# Patient Record
Sex: FEMALE | Race: WHITE | Employment: OTHER | URBAN - METROPOLITAN AREA
[De-identification: names, ages, dates, MRNs, and addresses within clinical notes are randomized per-mention and may not be internally consistent; named-entity substitution may affect disease eponyms.]

---

## 2019-03-21 ENCOUNTER — HOSPITAL ENCOUNTER (OUTPATIENT)
Age: 64
Setting detail: SPECIMEN
Discharge: HOME | End: 2019-03-21
Payer: MEDICARE

## 2019-03-21 LAB
ALBUMIN SERPL-MCNC: 4.5 G/DL (ref 3.5–5.2)
ALBUMIN/GLOBULIN RATIO: 1.7 (ref 1–2.5)
ALP BLD-CCNC: 86 U/L (ref 35–104)
ALT SERPL-CCNC: 15 U/L (ref 5–33)
ANION GAP SERPL CALCULATED.3IONS-SCNC: 14 MMOL/L (ref 9–17)
AST SERPL-CCNC: 23 U/L
BILIRUB SERPL-MCNC: 0.41 MG/DL (ref 0.3–1.2)
BUN BLDV-MCNC: 17 MG/DL (ref 8–23)
BUN/CREAT BLD: ABNORMAL (ref 9–20)
CALCIUM SERPL-MCNC: 9.6 MG/DL (ref 8.6–10.4)
CHLORIDE BLD-SCNC: 106 MMOL/L (ref 98–107)
CHOLESTEROL/HDL RATIO: 3.5
CHOLESTEROL: 200 MG/DL
CO2: 22 MMOL/L (ref 20–31)
CREAT SERPL-MCNC: 0.83 MG/DL (ref 0.5–0.9)
FOLATE: 10.9 NG/ML
GFR AFRICAN AMERICAN: >60 ML/MIN
GFR NON-AFRICAN AMERICAN: >60 ML/MIN
GFR SERPL CREATININE-BSD FRML MDRD: ABNORMAL ML/MIN/{1.73_M2}
GFR SERPL CREATININE-BSD FRML MDRD: ABNORMAL ML/MIN/{1.73_M2}
GLUCOSE BLD-MCNC: 102 MG/DL (ref 70–99)
HCT VFR BLD CALC: 42.6 % (ref 36.3–47.1)
HDLC SERPL-MCNC: 57 MG/DL
HEMOGLOBIN: 14.1 G/DL (ref 11.9–15.1)
LDL CHOLESTEROL: 119 MG/DL (ref 0–130)
LIPASE: 20 U/L (ref 13–60)
MCH RBC QN AUTO: 30.9 PG (ref 25.2–33.5)
MCHC RBC AUTO-ENTMCNC: 33.1 G/DL (ref 28.4–34.8)
MCV RBC AUTO: 93.4 FL (ref 82.6–102.9)
NRBC AUTOMATED: 0 PER 100 WBC
PDW BLD-RTO: 12.8 % (ref 11.8–14.4)
PLATELET # BLD: 253 K/UL (ref 138–453)
PMV BLD AUTO: 9.6 FL (ref 8.1–13.5)
POTASSIUM SERPL-SCNC: 4 MMOL/L (ref 3.7–5.3)
RBC # BLD: 4.56 M/UL (ref 3.95–5.11)
SODIUM BLD-SCNC: 142 MMOL/L (ref 135–144)
T3 TOTAL: 114 NG/DL (ref 80–200)
T4 TOTAL: 7 UG/DL (ref 4.5–12)
TOTAL PROTEIN: 7.1 G/DL (ref 6.4–8.3)
TRIGL SERPL-MCNC: 121 MG/DL
TSH SERPL DL<=0.05 MIU/L-ACNC: 4 MIU/L (ref 0.3–5)
VITAMIN B-12: 209 PG/ML (ref 232–1245)
VITAMIN D 25-HYDROXY: 41.6 NG/ML (ref 30–100)
VLDLC SERPL CALC-MCNC: ABNORMAL MG/DL (ref 1–30)
WBC # BLD: 4.9 K/UL (ref 3.5–11.3)

## 2019-03-22 ENCOUNTER — HOSPITAL ENCOUNTER (OUTPATIENT)
Age: 64
Discharge: HOME | End: 2019-03-22
Payer: MEDICARE

## 2019-03-22 ENCOUNTER — HOSPITAL ENCOUNTER (OUTPATIENT)
Dept: GENERAL RADIOLOGY | Facility: CLINIC | Age: 64
Discharge: HOME | End: 2019-03-24
Payer: MEDICARE

## 2019-03-22 ENCOUNTER — HOSPITAL ENCOUNTER (OUTPATIENT)
Facility: CLINIC | Age: 64
Discharge: HOME | End: 2019-03-24
Payer: MEDICARE

## 2019-03-22 DIAGNOSIS — R10.13 EPIGASTRIC PAIN: ICD-10-CM

## 2019-03-22 LAB
-: ABNORMAL
AMORPHOUS: ABNORMAL
BACTERIA: ABNORMAL
BILIRUBIN URINE: NEGATIVE
CASTS UA: ABNORMAL /LPF (ref 0–2)
COLOR: YELLOW
COMMENT UA: ABNORMAL
CRYSTALS, UA: ABNORMAL /HPF
CRYSTALS, UA: ABNORMAL /HPF
EPITHELIAL CELLS UA: ABNORMAL /HPF (ref 0–5)
GLUCOSE URINE: NEGATIVE
KETONES, URINE: NEGATIVE
LEUKOCYTE ESTERASE, URINE: ABNORMAL
MUCUS: ABNORMAL
NITRITE, URINE: NEGATIVE
OTHER OBSERVATIONS UA: ABNORMAL
PH UA: 5 (ref 5–8)
PROTEIN UA: NEGATIVE
RBC UA: ABNORMAL /HPF (ref 0–2)
RENAL EPITHELIAL, UA: ABNORMAL /HPF
SPECIFIC GRAVITY UA: 1.02 (ref 1–1.03)
TRICHOMONAS: ABNORMAL
TURBIDITY: ABNORMAL
URINE HGB: ABNORMAL
UROBILINOGEN, URINE: NORMAL
WBC UA: ABNORMAL /HPF (ref 0–5)
YEAST: ABNORMAL

## 2019-03-22 PROCEDURE — 87086 URINE CULTURE/COLONY COUNT: CPT

## 2019-03-22 PROCEDURE — 81001 URINALYSIS AUTO W/SCOPE: CPT

## 2019-03-22 PROCEDURE — 74019 RADEX ABDOMEN 2 VIEWS: CPT

## 2019-03-23 LAB
CULTURE: NORMAL
Lab: NORMAL
SPECIMEN DESCRIPTION: NORMAL

## 2019-08-27 ENCOUNTER — TELEPHONE (OUTPATIENT)
Dept: BARIATRICS/WEIGHT MGMT | Age: 64
End: 2019-08-27

## 2019-10-01 ENCOUNTER — OFFICE VISIT (OUTPATIENT)
Dept: BARIATRICS/WEIGHT MGMT | Age: 64
End: 2019-10-01
Payer: MEDICARE

## 2019-10-01 VITALS
BODY MASS INDEX: 40.18 KG/M2 | SYSTOLIC BLOOD PRESSURE: 114 MMHG | DIASTOLIC BLOOD PRESSURE: 80 MMHG | RESPIRATION RATE: 20 BRPM | HEART RATE: 68 BPM | HEIGHT: 66 IN | WEIGHT: 250 LBS

## 2019-10-01 DIAGNOSIS — R10.10 PAIN OF UPPER ABDOMEN: Primary | ICD-10-CM

## 2019-10-01 DIAGNOSIS — E78.2 MIXED HYPERLIPIDEMIA: ICD-10-CM

## 2019-10-01 DIAGNOSIS — Z86.711 HISTORY OF PULMONARY EMBOLISM: ICD-10-CM

## 2019-10-01 PROCEDURE — 3017F COLORECTAL CA SCREEN DOC REV: CPT | Performed by: SURGERY

## 2019-10-01 PROCEDURE — G8484 FLU IMMUNIZE NO ADMIN: HCPCS | Performed by: SURGERY

## 2019-10-01 PROCEDURE — 99204 OFFICE O/P NEW MOD 45 MIN: CPT | Performed by: SURGERY

## 2019-10-01 PROCEDURE — 1036F TOBACCO NON-USER: CPT | Performed by: SURGERY

## 2019-10-01 PROCEDURE — G8427 DOCREV CUR MEDS BY ELIG CLIN: HCPCS | Performed by: SURGERY

## 2019-10-01 PROCEDURE — G8417 CALC BMI ABV UP PARAM F/U: HCPCS | Performed by: SURGERY

## 2019-10-01 RX ORDER — IBUPROFEN 600 MG/1
TABLET ORAL
COMMUNITY
Start: 2018-05-02 | End: 2020-01-03

## 2019-10-01 RX ORDER — PANTOPRAZOLE SODIUM 40 MG/1
40 TABLET, DELAYED RELEASE ORAL
COMMUNITY

## 2019-10-01 RX ORDER — MELOXICAM 15 MG/1
TABLET ORAL
Refills: 2 | COMMUNITY
Start: 2019-09-13 | End: 2020-08-17

## 2019-10-04 ENCOUNTER — NURSE ONLY (OUTPATIENT)
Dept: BARIATRICS/WEIGHT MGMT | Age: 64
End: 2019-10-04

## 2019-10-04 ENCOUNTER — HOSPITAL ENCOUNTER (OUTPATIENT)
Age: 64
Setting detail: SPECIMEN
Discharge: HOME | End: 2019-10-04
Payer: MEDICARE

## 2019-10-04 VITALS — BODY MASS INDEX: 40.05 KG/M2 | WEIGHT: 248 LBS

## 2019-10-04 DIAGNOSIS — R10.10 PAIN OF UPPER ABDOMEN: ICD-10-CM

## 2019-10-04 LAB
ALBUMIN SERPL-MCNC: 4.3 G/DL (ref 3.5–5.2)
ALBUMIN/GLOBULIN RATIO: 1.5 (ref 1–2.5)
ALP BLD-CCNC: 89 U/L (ref 35–104)
ALT SERPL-CCNC: 15 U/L (ref 5–33)
ANION GAP SERPL CALCULATED.3IONS-SCNC: 15 MMOL/L (ref 9–17)
AST SERPL-CCNC: 18 U/L
BILIRUB SERPL-MCNC: 0.49 MG/DL (ref 0.3–1.2)
BUN BLDV-MCNC: 17 MG/DL (ref 8–23)
BUN/CREAT BLD: NORMAL (ref 9–20)
CALCIUM SERPL-MCNC: 9.3 MG/DL (ref 8.6–10.4)
CHLORIDE BLD-SCNC: 105 MMOL/L (ref 98–107)
CHOLESTEROL/HDL RATIO: 3.3
CHOLESTEROL: 177 MG/DL
CO2: 24 MMOL/L (ref 20–31)
CREAT SERPL-MCNC: 0.76 MG/DL (ref 0.5–0.9)
ESTIMATED AVERAGE GLUCOSE: 117 MG/DL
FOLATE: 11.9 NG/ML
GFR AFRICAN AMERICAN: >60 ML/MIN
GFR NON-AFRICAN AMERICAN: >60 ML/MIN
GFR SERPL CREATININE-BSD FRML MDRD: NORMAL ML/MIN/{1.73_M2}
GFR SERPL CREATININE-BSD FRML MDRD: NORMAL ML/MIN/{1.73_M2}
GLUCOSE BLD-MCNC: 98 MG/DL (ref 70–99)
HBA1C MFR BLD: 5.7 % (ref 4–6)
HCT VFR BLD CALC: 39.3 % (ref 36.3–47.1)
HDLC SERPL-MCNC: 53 MG/DL
HEMOGLOBIN: 12.7 G/DL (ref 11.9–15.1)
IRON SATURATION: 25 % (ref 20–55)
IRON: 87 UG/DL (ref 37–145)
LDL CHOLESTEROL: 98 MG/DL (ref 0–130)
MAGNESIUM: 2.3 MG/DL (ref 1.6–2.6)
MCH RBC QN AUTO: 29.9 PG (ref 25.2–33.5)
MCHC RBC AUTO-ENTMCNC: 32.3 G/DL (ref 28.4–34.8)
MCV RBC AUTO: 92.5 FL (ref 82.6–102.9)
NRBC AUTOMATED: 0 PER 100 WBC
PDW BLD-RTO: 13.2 % (ref 11.8–14.4)
PLATELET # BLD: 252 K/UL (ref 138–453)
PMV BLD AUTO: 9.9 FL (ref 8.1–13.5)
POTASSIUM SERPL-SCNC: 3.9 MMOL/L (ref 3.7–5.3)
PTH INTACT: 73.12 PG/ML (ref 15–65)
RBC # BLD: 4.25 M/UL (ref 3.95–5.11)
SODIUM BLD-SCNC: 144 MMOL/L (ref 135–144)
T4 TOTAL: 6.1 UG/DL (ref 4.5–12)
TOTAL IRON BINDING CAPACITY: 354 UG/DL (ref 250–450)
TOTAL PROTEIN: 7.1 G/DL (ref 6.4–8.3)
TRIGL SERPL-MCNC: 128 MG/DL
TSH SERPL DL<=0.05 MIU/L-ACNC: 4.42 MIU/L (ref 0.3–5)
UNSATURATED IRON BINDING CAPACITY: 267 UG/DL (ref 112–347)
VITAMIN B-12: 909 PG/ML (ref 232–1245)
VITAMIN D 25-HYDROXY: 39.8 NG/ML (ref 30–100)
VLDLC SERPL CALC-MCNC: NORMAL MG/DL (ref 1–30)
WBC # BLD: 4.8 K/UL (ref 3.5–11.3)

## 2019-10-04 PROCEDURE — 83970 ASSAY OF PARATHORMONE: CPT

## 2019-10-04 PROCEDURE — 82746 ASSAY OF FOLIC ACID SERUM: CPT

## 2019-10-04 PROCEDURE — 84425 ASSAY OF VITAMIN B-1: CPT

## 2019-10-04 PROCEDURE — 84436 ASSAY OF TOTAL THYROXINE: CPT

## 2019-10-04 PROCEDURE — 83550 IRON BINDING TEST: CPT

## 2019-10-04 PROCEDURE — 80061 LIPID PANEL: CPT

## 2019-10-04 PROCEDURE — 83540 ASSAY OF IRON: CPT

## 2019-10-04 PROCEDURE — 84590 ASSAY OF VITAMIN A: CPT

## 2019-10-04 PROCEDURE — 85027 COMPLETE CBC AUTOMATED: CPT

## 2019-10-04 PROCEDURE — 80053 COMPREHEN METABOLIC PANEL: CPT

## 2019-10-04 PROCEDURE — 83735 ASSAY OF MAGNESIUM: CPT

## 2019-10-04 PROCEDURE — 84443 ASSAY THYROID STIM HORMONE: CPT

## 2019-10-04 PROCEDURE — 36415 COLL VENOUS BLD VENIPUNCTURE: CPT

## 2019-10-04 PROCEDURE — 83036 HEMOGLOBIN GLYCOSYLATED A1C: CPT

## 2019-10-04 PROCEDURE — 84630 ASSAY OF ZINC: CPT

## 2019-10-04 PROCEDURE — 82306 VITAMIN D 25 HYDROXY: CPT

## 2019-10-04 PROCEDURE — 82607 VITAMIN B-12: CPT

## 2019-10-08 LAB — ZINC: 75 UG/DL (ref 60–120)

## 2019-10-09 LAB
RETINYL PALMITATE: 0.02 MG/L (ref 0–0.1)
VITAMIN A LEVEL: 0.51 MG/L (ref 0.3–1.2)
VITAMIN A, INTERP: NORMAL
VITAMIN B1 WHOLE BLOOD: 125 NMOL/L (ref 70–180)

## 2019-10-22 ENCOUNTER — TELEPHONE (OUTPATIENT)
Dept: ONCOLOGY | Age: 64
End: 2019-10-22

## 2019-10-22 ENCOUNTER — INITIAL CONSULT (OUTPATIENT)
Dept: ONCOLOGY | Age: 64
End: 2019-10-22
Payer: MEDICARE

## 2019-10-22 VITALS
WEIGHT: 246.6 LBS | HEART RATE: 63 BPM | RESPIRATION RATE: 18 BRPM | TEMPERATURE: 98.4 F | BODY MASS INDEX: 41.09 KG/M2 | DIASTOLIC BLOOD PRESSURE: 75 MMHG | HEIGHT: 65 IN | SYSTOLIC BLOOD PRESSURE: 118 MMHG

## 2019-10-22 DIAGNOSIS — E66.01 MORBID OBESITY (HCC): ICD-10-CM

## 2019-10-22 DIAGNOSIS — Z86.718 HX OF DEEP VENOUS THROMBOSIS: Primary | ICD-10-CM

## 2019-10-22 PROCEDURE — 99203 OFFICE O/P NEW LOW 30 MIN: CPT | Performed by: INTERNAL MEDICINE

## 2019-10-22 PROCEDURE — 1090F PRES/ABSN URINE INCON ASSESS: CPT | Performed by: INTERNAL MEDICINE

## 2019-10-22 PROCEDURE — G8417 CALC BMI ABV UP PARAM F/U: HCPCS | Performed by: INTERNAL MEDICINE

## 2019-10-22 PROCEDURE — G8427 DOCREV CUR MEDS BY ELIG CLIN: HCPCS | Performed by: INTERNAL MEDICINE

## 2019-10-22 PROCEDURE — G8484 FLU IMMUNIZE NO ADMIN: HCPCS | Performed by: INTERNAL MEDICINE

## 2019-10-22 PROCEDURE — 99201 HC NEW PT, E/M LEVEL 1: CPT

## 2019-10-22 RX ORDER — PRAVASTATIN SODIUM 20 MG
1 TABLET ORAL DAILY
Refills: 3 | COMMUNITY
Start: 2019-10-18

## 2019-10-22 RX ORDER — LANOLIN ALCOHOL/MO/W.PET/CERES
10 CREAM (GRAM) TOPICAL NIGHTLY PRN
COMMUNITY

## 2019-10-23 ENCOUNTER — OFFICE VISIT (OUTPATIENT)
Dept: BARIATRICS/WEIGHT MGMT | Age: 64
End: 2019-10-23
Payer: MEDICARE

## 2019-10-23 ENCOUNTER — NURSE ONLY (OUTPATIENT)
Dept: BARIATRICS/WEIGHT MGMT | Age: 64
End: 2019-10-23

## 2019-10-23 VITALS
DIASTOLIC BLOOD PRESSURE: 70 MMHG | HEART RATE: 72 BPM | BODY MASS INDEX: 40.98 KG/M2 | HEIGHT: 65 IN | SYSTOLIC BLOOD PRESSURE: 126 MMHG | WEIGHT: 246 LBS | RESPIRATION RATE: 18 BRPM

## 2019-10-23 DIAGNOSIS — E78.5 HYPERLIPIDEMIA, UNSPECIFIED HYPERLIPIDEMIA TYPE: ICD-10-CM

## 2019-10-23 DIAGNOSIS — Z86.711 HISTORY OF PULMONARY EMBOLISM: ICD-10-CM

## 2019-10-23 DIAGNOSIS — G89.29 BILATERAL CHRONIC KNEE PAIN: ICD-10-CM

## 2019-10-23 DIAGNOSIS — E66.01 OBESITY, CLASS III, BMI 40-49.9 (MORBID OBESITY) (HCC): ICD-10-CM

## 2019-10-23 DIAGNOSIS — K21.9 GASTROESOPHAGEAL REFLUX DISEASE WITHOUT ESOPHAGITIS: ICD-10-CM

## 2019-10-23 DIAGNOSIS — M15.9 PRIMARY OSTEOARTHRITIS INVOLVING MULTIPLE JOINTS: ICD-10-CM

## 2019-10-23 DIAGNOSIS — F12.90 MARIJUANA USE: ICD-10-CM

## 2019-10-23 DIAGNOSIS — G89.29 CHRONIC HIP PAIN, BILATERAL: ICD-10-CM

## 2019-10-23 DIAGNOSIS — F32.A DEPRESSION, UNSPECIFIED DEPRESSION TYPE: ICD-10-CM

## 2019-10-23 DIAGNOSIS — Z87.442 HISTORY OF KIDNEY STONES: ICD-10-CM

## 2019-10-23 DIAGNOSIS — I10 ESSENTIAL HYPERTENSION: ICD-10-CM

## 2019-10-23 DIAGNOSIS — M79.7 FIBROMYALGIA: ICD-10-CM

## 2019-10-23 DIAGNOSIS — M25.562 BILATERAL CHRONIC KNEE PAIN: ICD-10-CM

## 2019-10-23 DIAGNOSIS — G47.33 OSA (OBSTRUCTIVE SLEEP APNEA): ICD-10-CM

## 2019-10-23 DIAGNOSIS — M25.552 CHRONIC HIP PAIN, BILATERAL: ICD-10-CM

## 2019-10-23 DIAGNOSIS — M25.561 BILATERAL CHRONIC KNEE PAIN: ICD-10-CM

## 2019-10-23 DIAGNOSIS — R73.03 PREDIABETES: ICD-10-CM

## 2019-10-23 DIAGNOSIS — M25.551 CHRONIC HIP PAIN, BILATERAL: ICD-10-CM

## 2019-10-23 PROBLEM — M19.90 OSTEOARTHRITIS: Status: ACTIVE | Noted: 2019-10-23

## 2019-10-23 PROBLEM — E66.813 OBESITY, CLASS III, BMI 40-49.9 (MORBID OBESITY): Status: ACTIVE | Noted: 2019-10-23

## 2019-10-23 PROCEDURE — G8427 DOCREV CUR MEDS BY ELIG CLIN: HCPCS | Performed by: NURSE PRACTITIONER

## 2019-10-23 PROCEDURE — 3017F COLORECTAL CA SCREEN DOC REV: CPT | Performed by: NURSE PRACTITIONER

## 2019-10-23 PROCEDURE — 1036F TOBACCO NON-USER: CPT | Performed by: NURSE PRACTITIONER

## 2019-10-23 PROCEDURE — G8400 PT W/DXA NO RESULTS DOC: HCPCS | Performed by: NURSE PRACTITIONER

## 2019-10-23 PROCEDURE — 99213 OFFICE O/P EST LOW 20 MIN: CPT | Performed by: NURSE PRACTITIONER

## 2019-10-23 PROCEDURE — 1123F ACP DISCUSS/DSCN MKR DOCD: CPT | Performed by: NURSE PRACTITIONER

## 2019-10-23 PROCEDURE — 1090F PRES/ABSN URINE INCON ASSESS: CPT | Performed by: NURSE PRACTITIONER

## 2019-10-23 PROCEDURE — G8484 FLU IMMUNIZE NO ADMIN: HCPCS | Performed by: NURSE PRACTITIONER

## 2019-10-23 PROCEDURE — 4040F PNEUMOC VAC/ADMIN/RCVD: CPT | Performed by: NURSE PRACTITIONER

## 2019-10-23 PROCEDURE — G8417 CALC BMI ABV UP PARAM F/U: HCPCS | Performed by: NURSE PRACTITIONER

## 2019-11-22 ENCOUNTER — OFFICE VISIT (OUTPATIENT)
Dept: BARIATRICS/WEIGHT MGMT | Age: 64
End: 2019-11-22
Payer: MEDICARE

## 2019-11-22 VITALS
SYSTOLIC BLOOD PRESSURE: 126 MMHG | WEIGHT: 238 LBS | BODY MASS INDEX: 39.65 KG/M2 | HEART RATE: 74 BPM | DIASTOLIC BLOOD PRESSURE: 76 MMHG | RESPIRATION RATE: 18 BRPM | HEIGHT: 65 IN

## 2019-11-22 DIAGNOSIS — M15.9 PRIMARY OSTEOARTHRITIS INVOLVING MULTIPLE JOINTS: ICD-10-CM

## 2019-11-22 DIAGNOSIS — M79.7 FIBROMYALGIA: ICD-10-CM

## 2019-11-22 DIAGNOSIS — M25.561 BILATERAL CHRONIC KNEE PAIN: ICD-10-CM

## 2019-11-22 DIAGNOSIS — M25.562 BILATERAL CHRONIC KNEE PAIN: ICD-10-CM

## 2019-11-22 DIAGNOSIS — K21.9 GASTROESOPHAGEAL REFLUX DISEASE WITHOUT ESOPHAGITIS: ICD-10-CM

## 2019-11-22 DIAGNOSIS — F32.A DEPRESSION, UNSPECIFIED DEPRESSION TYPE: ICD-10-CM

## 2019-11-22 DIAGNOSIS — Z86.711 HISTORY OF PULMONARY EMBOLISM: ICD-10-CM

## 2019-11-22 DIAGNOSIS — G89.29 BILATERAL CHRONIC KNEE PAIN: ICD-10-CM

## 2019-11-22 DIAGNOSIS — M25.551 CHRONIC HIP PAIN, BILATERAL: ICD-10-CM

## 2019-11-22 DIAGNOSIS — E78.5 HYPERLIPIDEMIA, UNSPECIFIED HYPERLIPIDEMIA TYPE: Primary | ICD-10-CM

## 2019-11-22 DIAGNOSIS — E66.9 OBESITY (BMI 30-39.9): ICD-10-CM

## 2019-11-22 DIAGNOSIS — G47.33 OSA (OBSTRUCTIVE SLEEP APNEA): ICD-10-CM

## 2019-11-22 DIAGNOSIS — M25.552 CHRONIC HIP PAIN, BILATERAL: ICD-10-CM

## 2019-11-22 DIAGNOSIS — G89.29 CHRONIC HIP PAIN, BILATERAL: ICD-10-CM

## 2019-11-22 DIAGNOSIS — Z87.442 HISTORY OF KIDNEY STONES: ICD-10-CM

## 2019-11-22 DIAGNOSIS — R73.03 PREDIABETES: ICD-10-CM

## 2019-11-22 PROBLEM — E66.01 OBESITY, CLASS III, BMI 40-49.9 (MORBID OBESITY) (HCC): Status: RESOLVED | Noted: 2019-10-23 | Resolved: 2019-11-22

## 2019-11-22 PROBLEM — E66.813 OBESITY, CLASS III, BMI 40-49.9 (MORBID OBESITY) (HCC): Status: RESOLVED | Noted: 2019-10-23 | Resolved: 2019-11-22

## 2019-11-22 PROCEDURE — 4040F PNEUMOC VAC/ADMIN/RCVD: CPT | Performed by: NURSE PRACTITIONER

## 2019-11-22 PROCEDURE — 1090F PRES/ABSN URINE INCON ASSESS: CPT | Performed by: NURSE PRACTITIONER

## 2019-11-22 PROCEDURE — 1036F TOBACCO NON-USER: CPT | Performed by: NURSE PRACTITIONER

## 2019-11-22 PROCEDURE — G8400 PT W/DXA NO RESULTS DOC: HCPCS | Performed by: NURSE PRACTITIONER

## 2019-11-22 PROCEDURE — 3017F COLORECTAL CA SCREEN DOC REV: CPT | Performed by: NURSE PRACTITIONER

## 2019-11-22 PROCEDURE — G8417 CALC BMI ABV UP PARAM F/U: HCPCS | Performed by: NURSE PRACTITIONER

## 2019-11-22 PROCEDURE — 99213 OFFICE O/P EST LOW 20 MIN: CPT | Performed by: NURSE PRACTITIONER

## 2019-11-22 PROCEDURE — G8427 DOCREV CUR MEDS BY ELIG CLIN: HCPCS | Performed by: NURSE PRACTITIONER

## 2019-11-22 PROCEDURE — G8484 FLU IMMUNIZE NO ADMIN: HCPCS | Performed by: NURSE PRACTITIONER

## 2019-11-22 PROCEDURE — 1123F ACP DISCUSS/DSCN MKR DOCD: CPT | Performed by: NURSE PRACTITIONER

## 2019-12-03 ENCOUNTER — ANESTHESIA EVENT (OUTPATIENT)
Dept: ENDOSCOPY | Age: 64
End: 2019-12-03
Payer: MEDICARE

## 2019-12-04 ENCOUNTER — HOSPITAL ENCOUNTER (OUTPATIENT)
Age: 64
Setting detail: OUTPATIENT SURGERY
Discharge: HOME | End: 2019-12-04
Attending: SURGERY | Admitting: SURGERY
Payer: MEDICARE

## 2019-12-04 ENCOUNTER — ANESTHESIA (OUTPATIENT)
Dept: ENDOSCOPY | Age: 64
End: 2019-12-04
Payer: MEDICARE

## 2019-12-04 VITALS
OXYGEN SATURATION: 99 % | HEIGHT: 66 IN | BODY MASS INDEX: 38.57 KG/M2 | SYSTOLIC BLOOD PRESSURE: 122 MMHG | DIASTOLIC BLOOD PRESSURE: 79 MMHG | TEMPERATURE: 98 F | RESPIRATION RATE: 16 BRPM | HEART RATE: 70 BPM | WEIGHT: 240 LBS

## 2019-12-04 VITALS
OXYGEN SATURATION: 93 % | DIASTOLIC BLOOD PRESSURE: 95 MMHG | RESPIRATION RATE: 19 BRPM | SYSTOLIC BLOOD PRESSURE: 145 MMHG

## 2019-12-04 PROCEDURE — 3609012400 HC EGD TRANSORAL BIOPSY SINGLE/MULTIPLE: Performed by: SURGERY

## 2019-12-04 PROCEDURE — 6360000002 HC RX W HCPCS: Performed by: NURSE ANESTHETIST, CERTIFIED REGISTERED

## 2019-12-04 PROCEDURE — 3700000000 HC ANESTHESIA ATTENDED CARE: Performed by: SURGERY

## 2019-12-04 PROCEDURE — 7100000011 HC PHASE II RECOVERY - ADDTL 15 MIN: Performed by: SURGERY

## 2019-12-04 PROCEDURE — 2709999900 HC NON-CHARGEABLE SUPPLY: Performed by: SURGERY

## 2019-12-04 PROCEDURE — 87077 CULTURE AEROBIC IDENTIFY: CPT

## 2019-12-04 PROCEDURE — 7100000010 HC PHASE II RECOVERY - FIRST 15 MIN: Performed by: SURGERY

## 2019-12-04 PROCEDURE — 2500000003 HC RX 250 WO HCPCS: Performed by: NURSE ANESTHETIST, CERTIFIED REGISTERED

## 2019-12-04 PROCEDURE — 2580000003 HC RX 258: Performed by: NURSE ANESTHETIST, CERTIFIED REGISTERED

## 2019-12-04 PROCEDURE — 43239 EGD BIOPSY SINGLE/MULTIPLE: CPT | Performed by: SURGERY

## 2019-12-04 RX ORDER — PROPOFOL 10 MG/ML
INJECTION, EMULSION INTRAVENOUS PRN
Status: DISCONTINUED | OUTPATIENT
Start: 2019-12-04 | End: 2019-12-04 | Stop reason: SDUPTHER

## 2019-12-04 RX ORDER — SODIUM CHLORIDE, SODIUM LACTATE, POTASSIUM CHLORIDE, CALCIUM CHLORIDE 600; 310; 30; 20 MG/100ML; MG/100ML; MG/100ML; MG/100ML
INJECTION, SOLUTION INTRAVENOUS CONTINUOUS
Status: DISCONTINUED | OUTPATIENT
Start: 2019-12-04 | End: 2019-12-04 | Stop reason: HOSPADM

## 2019-12-04 RX ORDER — LIDOCAINE HYDROCHLORIDE 10 MG/ML
INJECTION, SOLUTION EPIDURAL; INFILTRATION; INTRACAUDAL; PERINEURAL PRN
Status: DISCONTINUED | OUTPATIENT
Start: 2019-12-04 | End: 2019-12-04 | Stop reason: SDUPTHER

## 2019-12-04 RX ORDER — SODIUM CHLORIDE 9 MG/ML
INJECTION, SOLUTION INTRAVENOUS CONTINUOUS PRN
Status: DISCONTINUED | OUTPATIENT
Start: 2019-12-04 | End: 2019-12-04 | Stop reason: SDUPTHER

## 2019-12-04 RX ORDER — LIDOCAINE HYDROCHLORIDE 10 MG/ML
1 INJECTION, SOLUTION EPIDURAL; INFILTRATION; INTRACAUDAL; PERINEURAL
Status: DISCONTINUED | OUTPATIENT
Start: 2019-12-04 | End: 2019-12-04 | Stop reason: HOSPADM

## 2019-12-04 RX ORDER — SODIUM CHLORIDE 9 MG/ML
INJECTION, SOLUTION INTRAVENOUS CONTINUOUS
Status: CANCELLED | OUTPATIENT
Start: 2019-12-04

## 2019-12-04 RX ADMIN — LIDOCAINE HYDROCHLORIDE 50 MG: 10 INJECTION, SOLUTION EPIDURAL; INFILTRATION; INTRACAUDAL at 09:30

## 2019-12-04 RX ADMIN — SODIUM CHLORIDE: 9 INJECTION, SOLUTION INTRAVENOUS at 09:28

## 2019-12-04 RX ADMIN — PROPOFOL 70 MG: 10 INJECTION, EMULSION INTRAVENOUS at 09:30

## 2019-12-04 RX ADMIN — PROPOFOL 20 MG: 10 INJECTION, EMULSION INTRAVENOUS at 09:32

## 2019-12-04 RX ADMIN — PROPOFOL 30 MG: 10 INJECTION, EMULSION INTRAVENOUS at 09:31

## 2019-12-04 ASSESSMENT — PAIN DESCRIPTION - LOCATION
LOCATION: HEAD
LOCATION: HEAD

## 2019-12-04 ASSESSMENT — PAIN DESCRIPTION - DESCRIPTORS
DESCRIPTORS: PRESSURE

## 2019-12-04 ASSESSMENT — PAIN SCALES - GENERAL
PAINLEVEL_OUTOF10: 0
PAINLEVEL_OUTOF10: 6
PAINLEVEL_OUTOF10: 6

## 2019-12-04 ASSESSMENT — LIFESTYLE VARIABLES: SMOKING_STATUS: 0

## 2019-12-04 ASSESSMENT — PAIN - FUNCTIONAL ASSESSMENT: PAIN_FUNCTIONAL_ASSESSMENT: 0-10

## 2019-12-05 LAB
DIRECT EXAM: NEGATIVE
Lab: NORMAL
SPECIMEN DESCRIPTION: NORMAL

## 2019-12-11 ENCOUNTER — NURSE ONLY (OUTPATIENT)
Dept: BARIATRICS/WEIGHT MGMT | Age: 64
End: 2019-12-11

## 2019-12-13 ENCOUNTER — OFFICE VISIT (OUTPATIENT)
Dept: BARIATRICS/WEIGHT MGMT | Age: 64
End: 2019-12-13
Payer: MEDICARE

## 2019-12-13 VITALS
HEIGHT: 66 IN | RESPIRATION RATE: 18 BRPM | HEART RATE: 72 BPM | SYSTOLIC BLOOD PRESSURE: 122 MMHG | BODY MASS INDEX: 38.73 KG/M2 | DIASTOLIC BLOOD PRESSURE: 72 MMHG | WEIGHT: 241 LBS

## 2019-12-13 DIAGNOSIS — M25.551 CHRONIC HIP PAIN, BILATERAL: ICD-10-CM

## 2019-12-13 DIAGNOSIS — G47.33 OSA (OBSTRUCTIVE SLEEP APNEA): Primary | ICD-10-CM

## 2019-12-13 DIAGNOSIS — F12.90 MARIJUANA USE: ICD-10-CM

## 2019-12-13 DIAGNOSIS — M79.7 FIBROMYALGIA: ICD-10-CM

## 2019-12-13 DIAGNOSIS — M25.562 BILATERAL CHRONIC KNEE PAIN: ICD-10-CM

## 2019-12-13 DIAGNOSIS — G89.29 CHRONIC HIP PAIN, BILATERAL: ICD-10-CM

## 2019-12-13 DIAGNOSIS — E78.5 HYPERLIPIDEMIA, UNSPECIFIED HYPERLIPIDEMIA TYPE: ICD-10-CM

## 2019-12-13 DIAGNOSIS — Z86.711 HISTORY OF PULMONARY EMBOLISM: ICD-10-CM

## 2019-12-13 DIAGNOSIS — K21.9 GASTROESOPHAGEAL REFLUX DISEASE WITHOUT ESOPHAGITIS: ICD-10-CM

## 2019-12-13 DIAGNOSIS — F32.A DEPRESSION, UNSPECIFIED DEPRESSION TYPE: ICD-10-CM

## 2019-12-13 DIAGNOSIS — G89.29 BILATERAL CHRONIC KNEE PAIN: ICD-10-CM

## 2019-12-13 DIAGNOSIS — R73.03 PREDIABETES: ICD-10-CM

## 2019-12-13 DIAGNOSIS — E66.9 OBESITY (BMI 30-39.9): ICD-10-CM

## 2019-12-13 DIAGNOSIS — Z87.442 HISTORY OF KIDNEY STONES: ICD-10-CM

## 2019-12-13 DIAGNOSIS — M25.552 CHRONIC HIP PAIN, BILATERAL: ICD-10-CM

## 2019-12-13 DIAGNOSIS — M25.561 BILATERAL CHRONIC KNEE PAIN: ICD-10-CM

## 2019-12-13 PROCEDURE — G8417 CALC BMI ABV UP PARAM F/U: HCPCS | Performed by: NURSE PRACTITIONER

## 2019-12-13 PROCEDURE — G8427 DOCREV CUR MEDS BY ELIG CLIN: HCPCS | Performed by: NURSE PRACTITIONER

## 2019-12-13 PROCEDURE — 4040F PNEUMOC VAC/ADMIN/RCVD: CPT | Performed by: NURSE PRACTITIONER

## 2019-12-13 PROCEDURE — 1036F TOBACCO NON-USER: CPT | Performed by: NURSE PRACTITIONER

## 2019-12-13 PROCEDURE — 3017F COLORECTAL CA SCREEN DOC REV: CPT | Performed by: NURSE PRACTITIONER

## 2019-12-13 PROCEDURE — 1123F ACP DISCUSS/DSCN MKR DOCD: CPT | Performed by: NURSE PRACTITIONER

## 2019-12-13 PROCEDURE — G8400 PT W/DXA NO RESULTS DOC: HCPCS | Performed by: NURSE PRACTITIONER

## 2019-12-13 PROCEDURE — 99213 OFFICE O/P EST LOW 20 MIN: CPT | Performed by: NURSE PRACTITIONER

## 2019-12-13 PROCEDURE — G8484 FLU IMMUNIZE NO ADMIN: HCPCS | Performed by: NURSE PRACTITIONER

## 2019-12-13 PROCEDURE — 1090F PRES/ABSN URINE INCON ASSESS: CPT | Performed by: NURSE PRACTITIONER

## 2020-01-03 ENCOUNTER — OFFICE VISIT (OUTPATIENT)
Dept: BARIATRICS/WEIGHT MGMT | Age: 65
End: 2020-01-03
Payer: MEDICARE

## 2020-01-03 VITALS
HEIGHT: 65 IN | BODY MASS INDEX: 40.15 KG/M2 | HEART RATE: 80 BPM | WEIGHT: 241 LBS | DIASTOLIC BLOOD PRESSURE: 82 MMHG | SYSTOLIC BLOOD PRESSURE: 130 MMHG

## 2020-01-03 PROBLEM — F12.91 HISTORY OF MARIJUANA USE: Status: ACTIVE | Noted: 2020-01-03

## 2020-01-03 PROCEDURE — 1036F TOBACCO NON-USER: CPT | Performed by: NURSE PRACTITIONER

## 2020-01-03 PROCEDURE — 1123F ACP DISCUSS/DSCN MKR DOCD: CPT | Performed by: NURSE PRACTITIONER

## 2020-01-03 PROCEDURE — G8484 FLU IMMUNIZE NO ADMIN: HCPCS | Performed by: NURSE PRACTITIONER

## 2020-01-03 PROCEDURE — G8427 DOCREV CUR MEDS BY ELIG CLIN: HCPCS | Performed by: NURSE PRACTITIONER

## 2020-01-03 PROCEDURE — 1090F PRES/ABSN URINE INCON ASSESS: CPT | Performed by: NURSE PRACTITIONER

## 2020-01-03 PROCEDURE — G8417 CALC BMI ABV UP PARAM F/U: HCPCS | Performed by: NURSE PRACTITIONER

## 2020-01-03 PROCEDURE — 3017F COLORECTAL CA SCREEN DOC REV: CPT | Performed by: NURSE PRACTITIONER

## 2020-01-03 PROCEDURE — 4040F PNEUMOC VAC/ADMIN/RCVD: CPT | Performed by: NURSE PRACTITIONER

## 2020-01-03 PROCEDURE — 99213 OFFICE O/P EST LOW 20 MIN: CPT | Performed by: NURSE PRACTITIONER

## 2020-01-03 PROCEDURE — G8400 PT W/DXA NO RESULTS DOC: HCPCS | Performed by: NURSE PRACTITIONER

## 2020-01-03 NOTE — PROGRESS NOTES
History   Problem Relation Age of Onset    Hypertension Father     Stroke Father     Alzheimer's Disease Mother     Coronary Art Dis Mother     Heart Attack Mother     Heart Disease Brother     No Known Problems Maternal Grandmother     Cancer Maternal Grandfather         lung    No Known Problems Paternal Grandmother     No Known Problems Paternal Grandfather        Social History:  Social History     Socioeconomic History    Marital status:      Spouse name: Not on file    Number of children: Not on file    Years of education: Not on file    Highest education level: Not on file   Occupational History    Not on file   Social Needs    Financial resource strain: Not on file    Food insecurity:     Worry: Not on file     Inability: Not on file    Transportation needs:     Medical: Not on file     Non-medical: Not on file   Tobacco Use    Smoking status: Former Smoker     Packs/day: 0.25     Years: 10.00     Pack years: 2.50     Types: Cigarettes     Last attempt to quit: 10/22/1978     Years since quittin.2    Smokeless tobacco: Never Used   Substance and Sexual Activity    Alcohol use: No    Drug use: Yes     Types: Marijuana     Comment: medical Jeanneemily Kim; no longer uses    Sexual activity: Not on file   Lifestyle    Physical activity:     Days per week: Not on file     Minutes per session: Not on file    Stress: Not on file   Relationships    Social connections:     Talks on phone: Not on file     Gets together: Not on file     Attends Mandaen service: Not on file     Active member of club or organization: Not on file     Attends meetings of clubs or organizations: Not on file     Relationship status: Not on file    Intimate partner violence:     Fear of current or ex partner: Not on file     Emotionally abused: Not on file     Physically abused: Not on file     Forced sexual activity: Not on file   Other Topics Concern    Not on file   Social History Narrative    Not on file       Current Medications:  Current Outpatient Medications   Medication Sig Dispense Refill    pravastatin (PRAVACHOL) 20 MG tablet Take 1 tablet by mouth daily  3    melatonin 3 MG TABS tablet Take 3 mg by mouth nightly as needed      pantoprazole (PROTONIX) 40 MG tablet Take 40 mg by mouth      meloxicam (MOBIC) 15 MG tablet TK 1 T PO  QD  2    Ketorolac Tromethamine (TORADOL ORAL PO) Inject as directed Injection prn      cyclobenzaprine (FLEXERIL) 10 MG tablet Take 5 mg by mouth daily       verapamil (CALAN) 120 MG tablet Take 240 mg by mouth nightly.  DULoxetine (CYMBALTA) 60 MG capsule Take 40 mg by mouth daily       APAP-Isometheptene-Dichloral (MIDRIN PO) Take 1 tablet by mouth as needed. No current facility-administered medications for this visit. Vital Signs:  /82   Pulse 80   Ht 5' 5\" (1.651 m)   Wt 241 lb (109.3 kg)   LMP 10/22/2007 (Within Months)   BMI 40.10 kg/m²     BMI/Height/Weight:  Body mass index is 40.1 kg/m². Review of Systems - A review of systems was performed. All was negative unless otherwise documented in HPI. Constitutional: Negative for fever, chills and diaphoresis. HENT: Negative for hearing loss and trouble swallowing. Eyes: Negative for photophobia and visual disturbance. Respiratory: Negative for cough, shortness of breath and wheezing. Cardiovascular: Negative for chest pain and palpitations. Gastrointestinal: Negative for nausea, vomiting, abdominal pain, diarrhea, constipation, blood in stool and abdominal distention. Endocrine: Negative for polydipsia, polyphagia and polyuria. Genitourinary: Negative for dysuria, frequency, hematuria and difficulty urinating. Musculoskeletal: Negative for myalgias, joint swelling. Skin: Negative for pallor and rash. Neurological: Negative for dizziness, tremors, light-headedness and headaches. Psychiatric/Behavioral: Negative for sleep disturbance and dysphoric mood. Objective:      Physical Exam   Vital signs reviewed. General: Well-developed and well-nourished. No acute distress. Skin: Warm, dry and intact. HEENT: Normocephalic. EOMs intact. Conjunctivae normal. Neck supple. Cardiovascular: Normal rate, regular rhythm. Pulmonary/Chest: Normal effort. Lungs clear to auscultation. No rales, rhonchi or wheezing. Abdominal: Positive bowel sounds. Soft, nontender. Nondistended. Musculoskeletal: Movement x4. No edema. Neurological: Gait normal. Alert and oriented to person, place, and time. Psychiatric: Normal mood and affect. Speech and behavior normal. Judgment and thought content normal. Cognition and memory intact. Assessment:       Diagnosis Orders   1. SAMEERA (obstructive sleep apnea)     2. Obesity (BMI 30-39.9)     3. History of pulmonary embolism     4. Chronic hip pain, bilateral     5. Bilateral chronic knee pain     6. Primary osteoarthritis involving multiple joints     7. Depression, unspecified depression type     8. Fibromyalgia     9. Chronic migraine     10. Prediabetes     11. History of kidney stones     12. Gastroesophageal reflux disease without esophagitis     13. Hyperlipidemia, unspecified hyperlipidemia type     14. History of marijuana use  Urine Drug Screen       Plan:    Dietitian visit today. Patient was encouraged to journal all food intake. Keep calorie level at approximately 2220-4418. Protein intake is to be a minimum of 60-80 grams per day. Water drinking was encouraged with a goal of 64oz-128oz daily. Beverages to be calorie free except for milk. Every other beverage should be water. Avoid soda. Continue to increase level of physical activity. Encouraged use of exercise log. Marijuana cessation discussed. Patient will stop use and remain marijuana free for life. UDS ordered. Follow-up  Return in about 1 month (around 2/3/2020).     Orders this encounter:  Orders Placed This Encounter   Procedures    Urine Drug Screen     Standing Status:   Future     Standing Expiration Date:   1/3/2021       Prescriptions this encounter:  No orders of the defined types were placed in this encounter.       Electronically signed by:  Areli Muniz CNP

## 2020-02-07 ENCOUNTER — HOSPITAL ENCOUNTER (OUTPATIENT)
Age: 65
Setting detail: SPECIMEN
Discharge: HOME | End: 2020-02-07
Payer: MEDICARE

## 2020-02-07 LAB
AMPHETAMINE SCREEN URINE: NEGATIVE
BARBITURATE SCREEN URINE: NEGATIVE
BENZODIAZEPINE SCREEN, URINE: NEGATIVE
BUPRENORPHINE URINE: NORMAL
CANNABINOID SCREEN URINE: NEGATIVE
COCAINE METABOLITE, URINE: NEGATIVE
MDMA URINE: NORMAL
METHADONE SCREEN, URINE: NEGATIVE
METHAMPHETAMINE, URINE: NORMAL
OPIATES, URINE: NEGATIVE
OXYCODONE SCREEN URINE: NEGATIVE
PHENCYCLIDINE, URINE: NEGATIVE
PROPOXYPHENE, URINE: NORMAL
TEST INFORMATION: NORMAL
TRICYCLIC ANTIDEPRESSANTS, UR: NORMAL

## 2020-02-10 ENCOUNTER — OFFICE VISIT (OUTPATIENT)
Dept: BARIATRICS/WEIGHT MGMT | Age: 65
End: 2020-02-10
Payer: MEDICARE

## 2020-02-10 VITALS
HEART RATE: 76 BPM | HEIGHT: 65 IN | BODY MASS INDEX: 39.82 KG/M2 | WEIGHT: 239 LBS | RESPIRATION RATE: 18 BRPM | DIASTOLIC BLOOD PRESSURE: 78 MMHG | SYSTOLIC BLOOD PRESSURE: 118 MMHG

## 2020-02-10 PROCEDURE — G8427 DOCREV CUR MEDS BY ELIG CLIN: HCPCS | Performed by: NURSE PRACTITIONER

## 2020-02-10 PROCEDURE — 1090F PRES/ABSN URINE INCON ASSESS: CPT | Performed by: NURSE PRACTITIONER

## 2020-02-10 PROCEDURE — 3017F COLORECTAL CA SCREEN DOC REV: CPT | Performed by: NURSE PRACTITIONER

## 2020-02-10 PROCEDURE — 1123F ACP DISCUSS/DSCN MKR DOCD: CPT | Performed by: NURSE PRACTITIONER

## 2020-02-10 PROCEDURE — G8417 CALC BMI ABV UP PARAM F/U: HCPCS | Performed by: NURSE PRACTITIONER

## 2020-02-10 PROCEDURE — 1036F TOBACCO NON-USER: CPT | Performed by: NURSE PRACTITIONER

## 2020-02-10 PROCEDURE — G8400 PT W/DXA NO RESULTS DOC: HCPCS | Performed by: NURSE PRACTITIONER

## 2020-02-10 PROCEDURE — 99213 OFFICE O/P EST LOW 20 MIN: CPT | Performed by: NURSE PRACTITIONER

## 2020-02-10 PROCEDURE — G8484 FLU IMMUNIZE NO ADMIN: HCPCS | Performed by: NURSE PRACTITIONER

## 2020-02-10 PROCEDURE — 4040F PNEUMOC VAC/ADMIN/RCVD: CPT | Performed by: NURSE PRACTITIONER

## 2020-02-10 NOTE — PROGRESS NOTES
reflection    13. I will food journal daily (If I dont find this helpful after one month I may discontinue the behavior with the understanding that it will be important to my health to do this for the first three months following surgery). 14. I will exercise daily for 10-30  minutes daily 24 days per month or more as tolerated. I will keep a daily log of my physical activity each day. 15. I will determine my optimal supplement plan. 16. I will purchase my supplements. 17. I will begin taking supplements according to my plan. 18. I will eat 8-11 servings of lean protein daily following the guidelines for meal planning in the patient educational binder provided to me. 19. I will eat every 3-5 hours for all meals for one day each week on a day of my choosing. 20. I will maintain my fluid intake of at least 64oz daily. 21. I will follow the 15/30/15 rule at least one day each week for all meals I am allowed to have a small 4oz beverage as needed at meal times. ( 15-30-15-do not drink 15minutes prior to a meal, take 30minutes to eat your meal and dont drink 15 minutes after your meal)    22. I will eat around my plate at all meals at least one day each week on a day of my choosing. Please write down the greatest motivator that brought you to us today  I want to manage my weight because                          5    appt # na oa What is your next step?   G# 1 2 3 4 5 6 7 8 9   0  x  1 100  SP SP          x  2            5    3 100 100   100        x   4             x   5             x   6             x   7             x   8             x   9             x   10             x   11                12                13            5    14 100 100   100       5    15                16                17             x   18             x   19            2 x   20     100        x   21 x   22            5   I will eat at least 4 times daily. I will include a lean protein each time I eat.  23  90   100           24                 25                                                                                                                          Do you understand your goals? y    Do you have the information you need to achieve your goals? y    Do you have any questions  right now? n        [x]  Consistent goal achievement in the program thus far and further success with goals is expected. []  Unable to consistently make progress in goal achievement. At this time patient is not moving forward  in developing the skills needed for success after surgery. Plan:    Continue to follow monthly and review goals.          [x]  Nutrition visits complete    []

## 2020-02-10 NOTE — PROGRESS NOTES
Medical Weight Management Progress Note    Subjective     Patient being seen for medically supervised weight loss for the chronic conditions of HTN, HLD, GERD, Hx Kidney Stones, Prediabetes, Chronic Migraine, Fibromyalgia, Depression, Arthritis, Chronic Bilateral Knee Pain, Chronic Bilateral Hip Pain, Hx PE, SAMEERA, Marijuana Use. She is working on the behavior changes discussed at the initial appointment. Patient continues on diet plan. Physical activity includes walking. Weight loss of 2 lbs since last visit. Using CPAP. Psych eval completed and clearance received. EGD completed and H Pylori negative. She is planning to have hip replacement surgery 3/23/20. No current issues. Working toward bariatric surgery:    [x] Sleeve Gastrectomy                                                           [] Sheree-en-Y Gastric Bypass    Allergies: Allergies   Allergen Reactions    Linzess [Linaclotide]     Percocet [Oxycodone-Acetaminophen]     Sulfa Antibiotics     Tramadol        Past Medical History:     Past Medical History:   Diagnosis Date    Abdominal pain     Arthritis     Celiac artery compression syndrome (HCC)     Depression     Fibromyalgia     GERD (gastroesophageal reflux disease)     Hx of blood clots     PE after back surgery    Hyperlipidemia     Kidney stones      passed spontaneously    Migraines     Sleep apnea    .     Past Surgical History:  Past Surgical History:   Procedure Laterality Date    BLADDER SUSPENSION       SECTION      COLONOSCOPY      CYSTOSCOPY Right 5/12/15    with laser litho and rt. stent insertion    LASIK Bilateral     LUMBAR FUSION      OTHER SURGICAL HISTORY      surgery for celiac artery compression syndrome    SHOULDER ARTHROPLASTY Right     TONSILLECTOMY      TUBAL LIGATION      UPPER GASTROINTESTINAL ENDOSCOPY      UPPER GASTROINTESTINAL ENDOSCOPY N/A 2019    EGD BIOPSY performed [facility]id Bacon [facility] at [facility]    WRIST ARTHROPLASTY Left        Family History:  Family History   Problem Relation Age of Onset    Hypertension Father     Stroke Father     Alzheimer's Disease Mother     Coronary Art Dis Mother     Heart Attack Mother     Heart Disease Brother     No Known Problems Maternal Grandmother     Cancer Maternal Grandfather         lung    No Known Problems Paternal Grandmother     No Known Problems Paternal Grandfather        Social History:  Social History     Socioeconomic History    Marital status:      Spouse name: Not on file    Number of children: Not on file    Years of education: Not on file    Highest education level: Not on file   Occupational History    Not on file   Social Needs    Financial resource strain: Not on file    Food insecurity:     Worry: Not on file     Inability: Not on file    Transportation needs:     Medical: Not on file     Non-medical: Not on file   Tobacco Use    Smoking status: Former Smoker     Packs/day: 0.25     Years: 10.00     Pack years: 2.50     Types: Cigarettes     Last attempt to quit: 10/22/1978     Years since quittin.3    Smokeless tobacco: Never Used   Substance and Sexual Activity    Alcohol use: No    Drug use: Yes     Types: Marijuana     Comment: medical Alferd Sole; no longer uses    Sexual activity: Not on file   Lifestyle    Physical activity:     Days per week: Not on file     Minutes per session: Not on file    Stress: Not on file   Relationships    Social connections:     Talks on phone: Not on file     Gets together: Not on file     Attends Gnosticist service: Not on file     Active member of club or organization: Not on file     Attends meetings of clubs or organizations: Not on file     Relationship status: Not on file    Intimate partner violence:     Fear of current or ex partner: Not on file     Emotionally abused: Not on file     Physically abused: Not on file     Forced sexual activity: Not on file   Other Topics Concern urinating. Musculoskeletal: Negative for myalgias, joint swelling. Skin: Negative for pallor and rash. Neurological: Negative for dizziness, tremors, light-headedness and headaches. Psychiatric/Behavioral: Negative for sleep disturbance and dysphoric mood. Objective:      Physical Exam   Vital signs reviewed. General: Well-developed and well-nourished. No acute distress. Skin: Warm, dry and intact. HEENT: Normocephalic. EOMs intact. Conjunctivae normal. Neck supple. Cardiovascular: Normal rate, regular rhythm. Pulmonary/Chest: Normal effort. Lungs clear to auscultation. No rales, rhonchi or wheezing. Abdominal: Positive bowel sounds. Soft, nontender. Nondistended. Musculoskeletal: Movement x4. No edema. Neurological: Gait normal. Alert and oriented to person, place, and time. Psychiatric: Normal mood and affect. Speech and behavior normal. Judgment and thought content normal. Cognition and memory intact. Assessment:       Diagnosis Orders   1. SAMEERA (obstructive sleep apnea)     2. History of pulmonary embolism     3. Obesity (BMI 30-39.9)     4. Chronic hip pain, bilateral     5. Bilateral chronic knee pain     6. Primary osteoarthritis involving multiple joints     7. Depression, unspecified depression type     8. Fibromyalgia     9. Chronic migraine     10. Prediabetes     11. History of kidney stones     12. Gastroesophageal reflux disease without esophagitis     13. Hyperlipidemia, unspecified hyperlipidemia type     14. History of marijuana use         Plan:    Dietitian visit today. Patient was encouraged to journal all food intake. Keep calorie level at approximately 8599-5943. Protein intake is to be a minimum of 60-80 grams per day. Water drinking was encouraged with a goal of 64oz-128oz daily. Beverages to be calorie free except for milk. Every other beverage should be water. Avoid soda. Continue to increase level of physical activity.   Encouraged use of exercise

## 2020-03-10 ENCOUNTER — OFFICE VISIT (OUTPATIENT)
Dept: BARIATRICS/WEIGHT MGMT | Age: 65
End: 2020-03-10
Payer: MEDICARE

## 2020-03-10 VITALS
WEIGHT: 240 LBS | HEART RATE: 82 BPM | BODY MASS INDEX: 39.99 KG/M2 | DIASTOLIC BLOOD PRESSURE: 70 MMHG | HEIGHT: 65 IN | SYSTOLIC BLOOD PRESSURE: 132 MMHG

## 2020-03-10 PROCEDURE — G8427 DOCREV CUR MEDS BY ELIG CLIN: HCPCS | Performed by: NURSE PRACTITIONER

## 2020-03-10 PROCEDURE — G8417 CALC BMI ABV UP PARAM F/U: HCPCS | Performed by: NURSE PRACTITIONER

## 2020-03-10 PROCEDURE — 3017F COLORECTAL CA SCREEN DOC REV: CPT | Performed by: NURSE PRACTITIONER

## 2020-03-10 PROCEDURE — 1123F ACP DISCUSS/DSCN MKR DOCD: CPT | Performed by: NURSE PRACTITIONER

## 2020-03-10 PROCEDURE — G8484 FLU IMMUNIZE NO ADMIN: HCPCS | Performed by: NURSE PRACTITIONER

## 2020-03-10 PROCEDURE — G8400 PT W/DXA NO RESULTS DOC: HCPCS | Performed by: NURSE PRACTITIONER

## 2020-03-10 PROCEDURE — 1036F TOBACCO NON-USER: CPT | Performed by: NURSE PRACTITIONER

## 2020-03-10 PROCEDURE — 4040F PNEUMOC VAC/ADMIN/RCVD: CPT | Performed by: NURSE PRACTITIONER

## 2020-03-10 PROCEDURE — 99213 OFFICE O/P EST LOW 20 MIN: CPT | Performed by: NURSE PRACTITIONER

## 2020-03-10 PROCEDURE — 1090F PRES/ABSN URINE INCON ASSESS: CPT | Performed by: NURSE PRACTITIONER

## 2020-03-10 NOTE — PROGRESS NOTES
Medical Nutrition Therapy   Metabolic and Bariatric Surgery         Supervised diet and exercise preparation  Visit 6 out of 6  Pt reports:      Pt currently following structured meal plan see goal number 23 below diet for weight management. Reviewed with pt. Vitals: Wt Readings from Last 3 Encounters:   03/10/20 240 lb (108.9 kg)   02/10/20 239 lb (108.4 kg)   01/03/20 241 lb (109.3 kg)     gained 1 lbs over one month      Nutrition Assessment:   PES: Knowledge deficit related to healthy behaviors that support weight management post weight loss surgery as evidenced by Body mass index is 39.94 kg/m². Nutrition Assessment of Goal Attainment:  TREATMENT GOALS:    1. Pt  Completed 3 out of 3 goals. 2.TREATMENT GOALS FOR UPCOMING WEEK: continue all previous goals and add: # 0    All goals were planned with and agreed on by the patient. Goal Card  Name                                                                                                                           Diony Vallecillo  1. I will read the entire patient educational binder provided to me prior to my second appointment at [facility]. 2. I will make my psychological evaluation appointment prior to my second appointment at [facility]. 3. I will bring this tracking tool to every appointment with a health care provider at [facility]. 4. I will eliminate all nicotine, tobacco and e-cigarettes prior to surgery. 5. I will limit alcoholic beverages prior to surgery to 1 mixed drink or glass of wine (4-6oz). 6. I will limit dining out including fast food to 3 times a week prior to surgery. 7. I will eliminate sugary beverages prior to surgery. 8. I will eliminate carbonated beverages prior to surgery. 9. I will eliminate drinking with a straw prior to surgery. 10. I will limit caffeinated beverages prior to my surgery to [address] daily. 11. I will eliminate cold cereals prepared with milk prior to surgery.     12. I will do a 5 minute reflection    13. I will food journal daily (If I dont find this helpful after one month I may discontinue the behavior with the understanding that it will be important to my health to do this for the first three months following surgery). 14. I will exercise daily for 10-30  minutes daily 24 days per month or more as tolerated. I will keep a daily log of my physical activity each day. 15. I will determine my optimal supplement plan. 16. I will purchase my supplements. 17. I will begin taking supplements according to my plan. 18. I will eat 8-11 servings of lean protein daily following the guidelines for meal planning in the patient educational binder provided to me. 19. I will eat every 3-5 hours for all meals for one day each week on a day of my choosing. 20. I will maintain my fluid intake of at least 64oz daily. 21. I will follow the 15/30/15 rule at least one day each week for all meals I am allowed to have a small 4oz beverage as needed at meal times. ( 15-30-15-do not drink 15minutes prior to a meal, take 30minutes to eat your meal and dont drink 15 minutes after your meal)    22. I will eat around my plate at all meals at least one day each week on a day of my choosing. Please write down the greatest motivator that brought you to us today  I want to manage my weight because                          5    appt # na oa What is your next step?   G# 1 2 3 4 5 6 7 8 9   0  x  1 100  SP SP          x  2            5    3 100 100   100        x   4             x   5             x   6             x   7             x   8             x   9             x   10             x   11                12                13            5    14 100 100   100       5    15                16                17             x   18             x   19            2 x   20     100        x   21

## 2020-03-10 NOTE — PROGRESS NOTES
Medical Weight Management Progress Note    Subjective     Patient being seen for medically supervised weight loss for the chronic conditions of HTN, HLD, GERD, Hx Kidney Stones, Prediabetes, Chronic Migraine, Fibromyalgia, Depression, Arthritis, Chronic Bilateral Knee Pain, Chronic Bilateral Hip Pain, Hx PE, SAMEERA, Marijuana Use. She is working on the behavior changes discussed at the initial appointment. Patient continues on diet plan. Physical activity includes walking. Weight gain of 1 lb since last visit. Using CPAP. Psych eval completed and clearance received. EGD completed and H Pylori negative. She is planning to have hip replacement surgery 3/23/20. No current issues. Working toward bariatric surgery:    [x] Sleeve Gastrectomy                                                           [] Sheree-en-Y Gastric Bypass    Allergies: Allergies   Allergen Reactions    Linzess [Linaclotide]     Percocet [Oxycodone-Acetaminophen]     Sulfa Antibiotics     Tramadol        Past Medical History:     Past Medical History:   Diagnosis Date    Abdominal pain     Arthritis     Celiac artery compression syndrome (HCC)     Depression     Fibromyalgia     GERD (gastroesophageal reflux disease)     Hx of blood clots     PE after back surgery    Hyperlipidemia     Kidney stones      passed spontaneously    Migraines     Sleep apnea    .     Past Surgical History:  Past Surgical History:   Procedure Laterality Date    BLADDER SUSPENSION       SECTION      COLONOSCOPY      CYSTOSCOPY Right 5/12/15    with laser litho and rt. stent insertion    LASIK Bilateral     LUMBAR FUSION      OTHER SURGICAL HISTORY      surgery for celiac artery compression syndrome    SHOULDER ARTHROPLASTY Right     TONSILLECTOMY      TUBAL LIGATION      UPPER GASTROINTESTINAL ENDOSCOPY      UPPER GASTROINTESTINAL ENDOSCOPY N/A 2019    [facility]    WRIST ARTHROPLASTY Left        Family History:  Family History   Problem Relation Age of Onset    Hypertension Father     Stroke Father     Alzheimer's Disease Mother     Coronary Art Dis Mother     Heart Attack Mother     Heart Disease Brother     No Known Problems Maternal Grandmother     Cancer Maternal Grandfather         lung    No Known Problems Paternal Grandmother     No Known Problems Paternal Grandfather        Social History:  Social History     Socioeconomic History    Marital status:      Spouse name: Not on file    Number of children: Not on file    Years of education: Not on file    Highest education level: Not on file   Occupational History    Not on file   Social Needs    Financial resource strain: Not on file    Food insecurity     Worry: Not on file     Inability: Not on file    Transportation needs     Medical: Not on file     Non-medical: Not on file   Tobacco Use    Smoking status: Former Smoker     Packs/day: 0.25     Years: 10.00     Pack years: 2.50     Types: Cigarettes     Last attempt to quit: 10/22/1978     Years since quittin.4    Smokeless tobacco: Never Used   Substance and Sexual Activity    Alcohol use: No    Drug use: Yes     Types: Marijuana     Comment: medical Wilbern Heal; no longer uses    Sexual activity: Not on file   Lifestyle    Physical activity     Days per week: Not on file     Minutes per session: Not on file    Stress: Not on file   Relationships    Social connections     Talks on phone: Not on file     Gets together: Not on file     Attends Jewish service: Not on file     Active member of club or organization: Not on file     Attends meetings of clubs or organizations: Not on file     Relationship status: Not on file    Intimate partner violence     Fear of current or ex partner: Not on file     Emotionally abused: Not on file     Physically abused: Not on file     Forced sexual activity: Not on file   Other Topics Concern    Musculoskeletal: Negative for myalgias, joint swelling. Skin: Negative for pallor and rash. Neurological: Negative for dizziness, tremors, light-headedness and headaches. Psychiatric/Behavioral: Negative for sleep disturbance and dysphoric mood. Objective:      Physical Exam   Vital signs reviewed. General: Well-developed and well-nourished. No acute distress. Skin: Warm, dry and intact. HEENT: Normocephalic. EOMs intact. Conjunctivae normal. Neck supple. Cardiovascular: Normal rate, regular rhythm. Pulmonary/Chest: Normal effort. Lungs clear to auscultation. No rales, rhonchi or wheezing. Abdominal: Positive bowel sounds. Soft, nontender. Nondistended. Musculoskeletal: Movement x4. No edema. Neurological: Gait normal. Alert and oriented to person, place, and time. Psychiatric: Normal mood and affect. Speech and behavior normal. Judgment and thought content normal. Cognition and memory intact. Assessment:       Diagnosis Orders   1. Hyperlipidemia, unspecified hyperlipidemia type     2. Gastroesophageal reflux disease without esophagitis     3. Depression, unspecified depression type     4. SAMEERA (obstructive sleep apnea)     5. Obesity (BMI 30-39.9)     6. Prediabetes     7. Chronic migraine     8. Fibromyalgia     9. Primary osteoarthritis involving multiple joints     10. Bilateral chronic knee pain     11. Chronic hip pain, bilateral     12. History of pulmonary embolism         Plan:    Dietitian visit today. Patient was encouraged to journal all food intake. Keep calorie level at approximately 5310-3643. Protein intake is to be a minimum of 60-80 grams per day. Water drinking was encouraged with a goal of 64oz-128oz daily. Beverages to be calorie free except for milk. Every other beverage should be water. Avoid soda. Continue to increase level of physical activity. Encouraged use of exercise log. Follow-up  No follow-ups on file.     Orders this encounter:  No orders of the

## 2020-04-30 ENCOUNTER — TELEPHONE (OUTPATIENT)
Dept: BARIATRICS/WEIGHT MGMT | Age: 65
End: 2020-04-30

## 2020-06-08 ENCOUNTER — TELEPHONE (OUTPATIENT)
Dept: BARIATRICS/WEIGHT MGMT | Age: 65
End: 2020-06-08

## 2020-08-17 ENCOUNTER — HOSPITAL ENCOUNTER (OUTPATIENT)
Dept: GENERAL RADIOLOGY | Age: 65
Discharge: HOME | End: 2020-08-19
Payer: MEDICARE

## 2020-08-17 ENCOUNTER — NURSE ONLY (OUTPATIENT)
Dept: BARIATRICS/WEIGHT MGMT | Age: 65
End: 2020-08-17

## 2020-08-17 ENCOUNTER — HOSPITAL ENCOUNTER (OUTPATIENT)
Dept: PREADMISSION TESTING | Age: 65
Discharge: HOME | End: 2020-08-21
Payer: MEDICARE

## 2020-08-17 VITALS
HEART RATE: 60 BPM | SYSTOLIC BLOOD PRESSURE: 144 MMHG | WEIGHT: 234.2 LBS | TEMPERATURE: 97.4 F | BODY MASS INDEX: 37.64 KG/M2 | HEIGHT: 66 IN | DIASTOLIC BLOOD PRESSURE: 85 MMHG | RESPIRATION RATE: 20 BRPM | OXYGEN SATURATION: 97 %

## 2020-08-17 VITALS — TEMPERATURE: 97.2 F

## 2020-08-17 LAB
ANION GAP SERPL CALCULATED.3IONS-SCNC: 13 MMOL/L (ref 9–17)
BUN BLDV-MCNC: 14 MG/DL (ref 8–23)
BUN/CREAT BLD: ABNORMAL (ref 9–20)
CALCIUM SERPL-MCNC: 9.8 MG/DL (ref 8.6–10.4)
CHLORIDE BLD-SCNC: 105 MMOL/L (ref 98–107)
CO2: 24 MMOL/L (ref 20–31)
CREAT SERPL-MCNC: 0.74 MG/DL (ref 0.5–0.9)
GFR AFRICAN AMERICAN: >60 ML/MIN
GFR NON-AFRICAN AMERICAN: >60 ML/MIN
GFR SERPL CREATININE-BSD FRML MDRD: ABNORMAL ML/MIN/{1.73_M2}
GFR SERPL CREATININE-BSD FRML MDRD: ABNORMAL ML/MIN/{1.73_M2}
GLUCOSE BLD-MCNC: 101 MG/DL (ref 70–99)
HCT VFR BLD CALC: 40.8 % (ref 36.3–47.1)
HEMOGLOBIN: 13.7 G/DL (ref 11.9–15.1)
INR BLD: 1
MCH RBC QN AUTO: 31.1 PG (ref 25.2–33.5)
MCHC RBC AUTO-ENTMCNC: 33.6 G/DL (ref 28.4–34.8)
MCV RBC AUTO: 92.5 FL (ref 82.6–102.9)
NRBC AUTOMATED: 0 PER 100 WBC
PARTIAL THROMBOPLASTIN TIME: 18.6 SEC (ref 20.5–30.5)
PDW BLD-RTO: 13.1 % (ref 11.8–14.4)
PLATELET # BLD: 166 K/UL (ref 138–453)
PMV BLD AUTO: 9.6 FL (ref 8.1–13.5)
POTASSIUM SERPL-SCNC: 3.9 MMOL/L (ref 3.7–5.3)
PROTHROMBIN TIME: 10.2 SEC (ref 9–12)
RBC # BLD: 4.41 M/UL (ref 3.95–5.11)
SODIUM BLD-SCNC: 142 MMOL/L (ref 135–144)
WBC # BLD: 5.4 K/UL (ref 3.5–11.3)

## 2020-08-17 PROCEDURE — 36415 COLL VENOUS BLD VENIPUNCTURE: CPT

## 2020-08-17 PROCEDURE — 71046 X-RAY EXAM CHEST 2 VIEWS: CPT

## 2020-08-17 PROCEDURE — 85027 COMPLETE CBC AUTOMATED: CPT

## 2020-08-17 PROCEDURE — 85730 THROMBOPLASTIN TIME PARTIAL: CPT

## 2020-08-17 PROCEDURE — G0480 DRUG TEST DEF 1-7 CLASSES: HCPCS

## 2020-08-17 PROCEDURE — 85610 PROTHROMBIN TIME: CPT

## 2020-08-17 PROCEDURE — 93005 ELECTROCARDIOGRAM TRACING: CPT | Performed by: SURGERY

## 2020-08-17 PROCEDURE — 80048 BASIC METABOLIC PNL TOTAL CA: CPT

## 2020-08-17 RX ORDER — SODIUM CHLORIDE, SODIUM LACTATE, POTASSIUM CHLORIDE, CALCIUM CHLORIDE 600; 310; 30; 20 MG/100ML; MG/100ML; MG/100ML; MG/100ML
1000 INJECTION, SOLUTION INTRAVENOUS CONTINUOUS
Status: CANCELLED | OUTPATIENT
Start: 2020-08-17

## 2020-08-17 RX ORDER — KETOROLAC TROMETHAMINE 15 MG/ML
30 INJECTION, SOLUTION INTRAMUSCULAR; INTRAVENOUS
Status: ON HOLD | COMMUNITY
End: 2020-08-31

## 2020-08-17 RX ORDER — HEPARIN SODIUM 5000 [USP'U]/ML
5000 INJECTION, SOLUTION INTRAVENOUS; SUBCUTANEOUS ONCE
Status: CANCELLED | OUTPATIENT
Start: 2020-08-17 | End: 2020-08-17

## 2020-08-17 ASSESSMENT — PAIN DESCRIPTION - LOCATION: LOCATION: HIP

## 2020-08-17 ASSESSMENT — PAIN DESCRIPTION - ONSET: ONSET: AWAKENED FROM SLEEP

## 2020-08-17 ASSESSMENT — PAIN DESCRIPTION - PAIN TYPE: TYPE: CHRONIC PAIN

## 2020-08-17 ASSESSMENT — PAIN DESCRIPTION - ORIENTATION: ORIENTATION: RIGHT;LEFT

## 2020-08-17 ASSESSMENT — PAIN SCALES - GENERAL: PAINLEVEL_OUTOF10: 4

## 2020-08-17 ASSESSMENT — PAIN DESCRIPTION - PROGRESSION: CLINICAL_PROGRESSION: GRADUALLY WORSENING

## 2020-08-17 ASSESSMENT — PAIN DESCRIPTION - DESCRIPTORS: DESCRIPTORS: CONSTANT;ACHING

## 2020-08-17 ASSESSMENT — PAIN DESCRIPTION - FREQUENCY: FREQUENCY: CONTINUOUS

## 2020-08-17 ASSESSMENT — PAIN - FUNCTIONAL ASSESSMENT: PAIN_FUNCTIONAL_ASSESSMENT: PREVENTS OR INTERFERES SOME ACTIVE ACTIVITIES AND ADLS

## 2020-08-17 NOTE — H&P
History and Physical    Pt Name: Delia Mims  MRN: [id number]  YOB: 1955  Date of evaluation: 2020  Primary Care Physician: Thomas Wadsworth DO  Patient evaluated at the request of  Dr. Kev Guzman    Reason for evaluation: obese    SUBJECTIVE:   History of Chief Complaint:    Cinthya Costa is a 71 y.o. female who has struggled with her/    weight for many years Her/   highest weight has Been 299 lbs approx ,  her/   lowest weight in the last 5 yrs has been 215 lbs ,  she/  has tried diet and exercise , yet unable to achieve adequate weight removal .         Past Medical History      has a past medical history of Abdominal pain, Arthritis, Celiac artery compression syndrome ([address] Depression, Fibromyalgia, GERD (gastroesophageal reflux disease), Hx of blood clots, Hyperlipidemia, Kidney stones, Migraines, and Sleep apnea. Past Surgical History     has a past surgical history that includes  section; Total shoulder arthroplasty (Right); bladder suspension; lumbar fusion; Tubal ligation; Tonsillectomy; Wrist Arthroplasty (Left); other surgical history; LASIK (Bilateral); Colonoscopy; Upper gastrointestinal endoscopy; Cystocopy (Right, 5/12/15); and Upper gastrointestinal endoscopy (N/A, 2019).      Medications  Current Meds:   Current Outpatient Rx   Medication Sig Dispense Refill    B Complex Vitamins (VITAMIN B COMPLEX PO) Take by mouth      MAGNESIUM MALATE PO Take by mouth      VITAMIN D PO Take by mouth      pravastatin (PRAVACHOL) 20 MG tablet Take 1 tablet by mouth daily  3    melatonin 3 MG TABS tablet Take 3 mg by mouth nightly as needed      pantoprazole (PROTONIX) 40 MG tablet Take 40 mg by mouth      meloxicam (MOBIC) 15 MG tablet TK 1 T PO  QD  2    Ketorolac Tromethamine (TORADOL ORAL PO) Inject as directed Injection prn      cyclobenzaprine (FLEXERIL) 10 MG tablet Take 5 mg by mouth daily       verapamil (CALAN) 120 MG tablet Take 240 mg by mouth nightly.  DULoxetine (CYMBALTA) 60 MG capsule Take 20 mg by mouth daily       APAP-Isometheptene-Dichloral (MIDRIN PO) Take 1 tablet by mouth as needed. Continuous Infusions:  PRN Meds:. Allergies  is allergic to linzess [linaclotide]; percocet [oxycodone-acetaminophen]; sulfa antibiotics; and tramadol. Family History  family history includes Alzheimer's Disease in her mother; Cancer in her maternal grandfather; Coronary Art Dis in her mother; Heart Attack in her mother; Heart Disease in her brother; Hypertension in her father; No Known Problems in her maternal grandmother, paternal grandfather, and paternal grandmother; Stroke in her father.     Family Status   Relation Name Status    Father      Mother     Javid Filter Brother      MGM      MGF      [address]      PGF           Social History  Social History     Socioeconomic History    Marital status:      Spouse name: Not on file    Number of children: Not on file    Years of education: Not on file    Highest education level: Not on file   Occupational History    Not on file   Social Needs    Financial resource strain: Not on file    Food insecurity     Worry: Not on file     Inability: Not on file    Transportation needs     Medical: Not on file     Non-medical: Not on file   Tobacco Use    Smoking status: Former Smoker     Packs/day: 0.25     Years: 10.00     Pack years: 2.50     Types: Cigarettes     Last attempt to quit: 10/22/1978     Years since quittin.8    Smokeless tobacco: Never Used   Substance and Sexual Activity    Alcohol use: No    Drug use: Yes     Types: Marijuana     Comment: medical Bianca Davila; no longer uses    Sexual activity: Not on file   Lifestyle    Physical activity     Days per week: Not on file     Minutes per session: Not on file    Stress: Not on file   Relationships    Social connections     Talks on phone: Not on file     Gets together: Not on file     Attends Congregational service: Not on file     Active member of club or organization: Not on file     Attends meetings of clubs or organizations: Not on file     Relationship status: Not on file    Intimate partner violence     Fear of current or ex partner: Not on file     Emotionally abused: Not on file     Physically abused: Not on file     Forced sexual activity: Not on file   Other Topics Concern    Not on file   Social History Narrative    Not on file            Occupation: retired [employer]     Hobbies :  Grandmother , art painting   OBJECTIVE:   VITALS:  vitals were not taken for this visit. CONSTITUTIONAL: Alert and oriented times 3, no acute distress and cooperative to examination. Friendly and pleasant     SKIN: rash No    HEENT: Head is normocephalic, atraumatic. EOMI, PERRLA    Oral air way :slightly narrow Yes    NECK: neck supple, no lymphadenopathy noted, trachea midline and straight      2+ carotid, no bruit    LUNGS: Chest expands equally bilaterally upon respiration, no accessory muscle used. Ausculation reveals no adventitious breath sounds. CARDIOVASCULAR: \"Heart sounds are normal.  Regular rate and rhythm without murmur,    ABDOMEN: Bowel sounds are present in all four quadrants  , obese    GENATALIA:Deferred. NEUROLOGIC: \"CN II-XII are grossly intact. EXTREMITIES: Pitting edema:  No,    Varicose veins: No     Dorsal pedal/posterior tibial pulses palpable: Yes     Strength :  Not tested          Patient Active Problem List   Diagnosis    HLD (hyperlipidemia)    Gastroesophageal reflux disease without esophagitis    History of kidney stones    Prediabetes    Chronic migraine    Fibromyalgia    Depression    Osteoarthritis    Bilateral chronic knee pain    Chronic hip pain, bilateral    Marijuana use    History of pulmonary embolism    SAMEERA (obstructive sleep apnea)    Obesity (BMI 30-39. 9)    History of marijuana use                   Scheduled for: LAPAROSCOPIC XI ROBOTIC GASTRECTOMY SLEEVE, LIVER BIOPSY, EGD- GI UNIT SCHEDULED.          [facility]  Electronically signed 8/17/2020 at 10:18 AM       :

## 2020-08-17 NOTE — H&P (VIEW-ONLY)
History and Physical    Pt Name: Holley Alston  MRN: [id number]  YOB: 1955  Date of evaluation: 2020  Primary Care Physician: Lubertha Boeck, DO  Patient evaluated at the request of  Dr. Shaina Montoya    Reason for evaluation: obese    SUBJECTIVE:   History of Chief Complaint:    Ame Loera is a 71 y.o. female who has struggled with her/    weight for many years Her/   highest weight has Been 299 lbs approx ,  her/   lowest weight in the last 5 yrs has been 215 lbs ,  she/  has tried diet and exercise , yet unable to achieve adequate weight removal .         Past Medical History      has a past medical history of Abdominal pain, Arthritis, Celiac artery compression syndrome ([address] Depression, Fibromyalgia, GERD (gastroesophageal reflux disease), Hx of blood clots, Hyperlipidemia, Kidney stones, Migraines, and Sleep apnea. Past Surgical History     has a past surgical history that includes  section; Total shoulder arthroplasty (Right); bladder suspension; lumbar fusion; Tubal ligation; Tonsillectomy; Wrist Arthroplasty (Left); other surgical history; LASIK (Bilateral); Colonoscopy; Upper gastrointestinal endoscopy; Cystocopy (Right, 5/12/15); and Upper gastrointestinal endoscopy (N/A, 2019).      Medications  Current Meds:   Current Outpatient Rx   Medication Sig Dispense Refill    B Complex Vitamins (VITAMIN B COMPLEX PO) Take by mouth      MAGNESIUM MALATE PO Take by mouth      VITAMIN D PO Take by mouth      pravastatin (PRAVACHOL) 20 MG tablet Take 1 tablet by mouth daily  3    melatonin 3 MG TABS tablet Take 3 mg by mouth nightly as needed      pantoprazole (PROTONIX) 40 MG tablet Take 40 mg by mouth      meloxicam (MOBIC) 15 MG tablet TK 1 T PO  QD  2    Ketorolac Tromethamine (TORADOL ORAL PO) Inject as directed Injection prn      cyclobenzaprine (FLEXERIL) 10 MG tablet Take 5 mg by mouth daily       verapamil (CALAN) 120 MG tablet Take 240 mg by mouth nightly.  DULoxetine (CYMBALTA) 60 MG capsule Take 20 mg by mouth daily       APAP-Isometheptene-Dichloral (MIDRIN PO) Take 1 tablet by mouth as needed. Continuous Infusions:  PRN Meds:. Allergies  is allergic to linzess [linaclotide]; percocet [oxycodone-acetaminophen]; sulfa antibiotics; and tramadol. Family History  family history includes Alzheimer's Disease in her mother; Cancer in her maternal grandfather; Coronary Art Dis in her mother; Heart Attack in her mother; Heart Disease in her brother; Hypertension in her father; No Known Problems in her maternal grandmother, paternal grandfather, and paternal grandmother; Stroke in her father.     Family Status   Relation Name Status    Father      Mother     Brigida Sole Brother      MGM      MGF      [address]      PGF           Social History  Social History     Socioeconomic History    Marital status:      Spouse name: Not on file    Number of children: Not on file    Years of education: Not on file    Highest education level: Not on file   Occupational History    Not on file   Social Needs    Financial resource strain: Not on file    Food insecurity     Worry: Not on file     Inability: Not on file    Transportation needs     Medical: Not on file     Non-medical: Not on file   Tobacco Use    Smoking status: Former Smoker     Packs/day: 0.25     Years: 10.00     Pack years: 2.50     Types: Cigarettes     Last attempt to quit: 10/22/1978     Years since quittin.8    Smokeless tobacco: Never Used   Substance and Sexual Activity    Alcohol use: No    Drug use: Yes     Types: Marijuana     Comment: medical Maggie Lowe; no longer uses    Sexual activity: Not on file   Lifestyle    Physical activity     Days per week: Not on file     Minutes per session: Not on file    Stress: Not on file   Relationships    Social connections     Talks on phone: Not on file     Gets together: Not on file     Attends Rastafari service: Not on file     Active member of club or organization: Not on file     Attends meetings of clubs or organizations: Not on file     Relationship status: Not on file    Intimate partner violence     Fear of current or ex partner: Not on file     Emotionally abused: Not on file     Physically abused: Not on file     Forced sexual activity: Not on file   Other Topics Concern    Not on file   Social History Narrative    Not on file            Occupation: retired [employer]     Hobbies :  Grandmother , art painting   OBJECTIVE:   VITALS:  vitals were not taken for this visit. CONSTITUTIONAL: Alert and oriented times 3, no acute distress and cooperative to examination. Friendly and pleasant     SKIN: rash No    HEENT: Head is normocephalic, atraumatic. EOMI, PERRLA    Oral air way :slightly narrow Yes    NECK: neck supple, no lymphadenopathy noted, trachea midline and straight      2+ carotid, no bruit    LUNGS: Chest expands equally bilaterally upon respiration, no accessory muscle used. Ausculation reveals no adventitious breath sounds. CARDIOVASCULAR: \"Heart sounds are normal.  Regular rate and rhythm without murmur,    ABDOMEN: Bowel sounds are present in all four quadrants  , obese    GENATALIA:Deferred. NEUROLOGIC: \"CN II-XII are grossly intact. EXTREMITIES: Pitting edema:  No,    Varicose veins: No     Dorsal pedal/posterior tibial pulses palpable: Yes     Strength :  Not tested          Patient Active Problem List   Diagnosis    HLD (hyperlipidemia)    Gastroesophageal reflux disease without esophagitis    History of kidney stones    Prediabetes    Chronic migraine    Fibromyalgia    Depression    Osteoarthritis    Bilateral chronic knee pain    Chronic hip pain, bilateral    Marijuana use    History of pulmonary embolism    SAMEERA (obstructive sleep apnea)    Obesity (BMI 30-39. 9)    History of marijuana use                   Scheduled for: LAPAROSCOPIC XI ROBOTIC GASTRECTOMY SLEEVE, LIVER BIOPSY, EGD- GI UNIT SCHEDULED.          Akash Harris [facility]  Electronically signed 8/17/2020 at 10:18 AM       :

## 2020-08-17 NOTE — ANESTHESIA PRE-OP
Anesthesia Focused Assessment    STOP-BANG Sleep Apnea Questionnaire    Obstructive Sleep Apnea:                                                                 Yes     Machine used: Yes            SNORE loudly (heard through closed doors)? Yes      TIRED, fatigued, sleepy during daytime? Yes      OBSERVED stopping breathing during sleep? Yes      High blood PRESSURE being treated?     no      BMI over 35? Yes  AGE over 48? Yes  NECK circumference over 16\"? No  GENDER (male)? No                High risk 5-8  Intermediate risk 3-4  Low risk 0-2    Total 3  High risk 5-8  Intermediate risk 3-4  Low risk 0-2      Type 1 DM:                                                                                        No    TYPE 2:                                                                                             No    If yes, insulin dependent? No    Coronary Artery Disease :                                                                No        Active smoker                                                                                   No    Drinks Alcohol                                                                                   No    Dentition: Dentures                                                                            No              Partial                                                                                                 No    Any loose or missing teeth                                                                 Yes, missing    Defib / AICD / Pacemaker:                                                               No    Renal Failure: No    If Yes, On dialysis?       Hx of anesthesia complications with Patient                               No    Hx of anesthesia complications with family No    Medical or cardiac clearance ordered:                                         Yes, pulmonary and medical pending     Anesthesiologist called:                                                                No    [address] PA-C  Electronically signed 8/17/2020 at 10:17 AM 2

## 2020-08-18 LAB
EKG ATRIAL RATE: 55 BPM
EKG P AXIS: 43 DEGREES
EKG P-R INTERVAL: 256 MS
EKG Q-T INTERVAL: 438 MS
EKG QRS DURATION: 82 MS
EKG QTC CALCULATION (BAZETT): 419 MS
EKG R AXIS: 37 DEGREES
EKG T AXIS: 56 DEGREES
EKG VENTRICULAR RATE: 55 BPM

## 2020-08-19 LAB
3-OH-COTININE: <2 NG/ML
COTININE: <2 NG/ML
NICOTINE: <2 NG/ML

## 2020-08-27 ENCOUNTER — TELEPHONE (OUTPATIENT)
Dept: BARIATRICS/WEIGHT MGMT | Age: 65
End: 2020-08-27

## 2020-08-27 ENCOUNTER — HOSPITAL ENCOUNTER (OUTPATIENT)
Dept: PREADMISSION TESTING | Age: 65
Setting detail: SPECIMEN
Discharge: HOME | End: 2020-08-31
Payer: MEDICARE

## 2020-08-27 ENCOUNTER — OFFICE VISIT (OUTPATIENT)
Dept: BARIATRICS/WEIGHT MGMT | Age: 65
End: 2020-08-27
Payer: MEDICARE

## 2020-08-27 VITALS
DIASTOLIC BLOOD PRESSURE: 64 MMHG | TEMPERATURE: 97.2 F | SYSTOLIC BLOOD PRESSURE: 110 MMHG | HEART RATE: 76 BPM | HEIGHT: 65 IN | BODY MASS INDEX: 37.82 KG/M2 | WEIGHT: 227 LBS

## 2020-08-27 PROCEDURE — 99213 OFFICE O/P EST LOW 20 MIN: CPT | Performed by: SURGERY

## 2020-08-27 PROCEDURE — G8400 PT W/DXA NO RESULTS DOC: HCPCS | Performed by: SURGERY

## 2020-08-27 PROCEDURE — U0003 INFECTIOUS AGENT DETECTION BY NUCLEIC ACID (DNA OR RNA); SEVERE ACUTE RESPIRATORY SYNDROME CORONAVIRUS 2 (SARS-COV-2) (CORONAVIRUS DISEASE [COVID-19]), AMPLIFIED PROBE TECHNIQUE, MAKING USE OF HIGH THROUGHPUT TECHNOLOGIES AS DESCRIBED BY CMS-2020-01-R: HCPCS

## 2020-08-27 PROCEDURE — 1123F ACP DISCUSS/DSCN MKR DOCD: CPT | Performed by: SURGERY

## 2020-08-27 PROCEDURE — G8417 CALC BMI ABV UP PARAM F/U: HCPCS | Performed by: SURGERY

## 2020-08-27 PROCEDURE — 1036F TOBACCO NON-USER: CPT | Performed by: SURGERY

## 2020-08-27 PROCEDURE — 3017F COLORECTAL CA SCREEN DOC REV: CPT | Performed by: SURGERY

## 2020-08-27 PROCEDURE — G8427 DOCREV CUR MEDS BY ELIG CLIN: HCPCS | Performed by: SURGERY

## 2020-08-27 PROCEDURE — 1090F PRES/ABSN URINE INCON ASSESS: CPT | Performed by: SURGERY

## 2020-08-27 PROCEDURE — 4040F PNEUMOC VAC/ADMIN/RCVD: CPT | Performed by: SURGERY

## 2020-08-29 LAB — SARS-COV-2, NAA: NOT DETECTED

## 2020-08-31 ENCOUNTER — ANESTHESIA (OUTPATIENT)
Dept: OPERATING ROOM | Age: 65
DRG: 621 | End: 2020-08-31
Payer: MEDICARE

## 2020-08-31 ENCOUNTER — HOSPITAL ENCOUNTER (INPATIENT)
Age: 65
LOS: 1 days | Discharge: HOME | DRG: 621 | End: 2020-09-01
Attending: SURGERY | Admitting: SURGERY
Payer: MEDICARE

## 2020-08-31 ENCOUNTER — ANESTHESIA EVENT (OUTPATIENT)
Dept: OPERATING ROOM | Age: 65
DRG: 621 | End: 2020-08-31
Payer: MEDICARE

## 2020-08-31 VITALS — DIASTOLIC BLOOD PRESSURE: 82 MMHG | TEMPERATURE: 94.5 F | OXYGEN SATURATION: 93 % | SYSTOLIC BLOOD PRESSURE: 151 MMHG

## 2020-08-31 PROBLEM — Z98.84 STATUS POST LAPAROSCOPIC SLEEVE GASTRECTOMY: Status: ACTIVE | Noted: 2020-08-31

## 2020-08-31 LAB
ANION GAP SERPL CALCULATED.3IONS-SCNC: 15 MMOL/L (ref 9–17)
BUN BLDV-MCNC: 22 MG/DL (ref 8–23)
BUN/CREAT BLD: ABNORMAL (ref 9–20)
CALCIUM SERPL-MCNC: 9.2 MG/DL (ref 8.6–10.4)
CHLORIDE BLD-SCNC: 105 MMOL/L (ref 98–107)
CO2: 17 MMOL/L (ref 20–31)
CREAT SERPL-MCNC: 0.76 MG/DL (ref 0.5–0.9)
GFR AFRICAN AMERICAN: >60 ML/MIN
GFR NON-AFRICAN AMERICAN: >60 ML/MIN
GFR SERPL CREATININE-BSD FRML MDRD: ABNORMAL ML/MIN/{1.73_M2}
GFR SERPL CREATININE-BSD FRML MDRD: ABNORMAL ML/MIN/{1.73_M2}
GLUCOSE BLD-MCNC: 142 MG/DL (ref 65–105)
GLUCOSE BLD-MCNC: 146 MG/DL (ref 70–99)
HCT VFR BLD CALC: 44.5 % (ref 36.3–47.1)
HEMOGLOBIN: 13.9 G/DL (ref 11.9–15.1)
MCH RBC QN AUTO: 30.2 PG (ref 25.2–33.5)
MCHC RBC AUTO-ENTMCNC: 31.2 G/DL (ref 28.4–34.8)
MCV RBC AUTO: 96.5 FL (ref 82.6–102.9)
NRBC AUTOMATED: 0 PER 100 WBC
PDW BLD-RTO: 13.2 % (ref 11.8–14.4)
PLATELET # BLD: 179 K/UL (ref 138–453)
PMV BLD AUTO: 9.8 FL (ref 8.1–13.5)
POTASSIUM SERPL-SCNC: 3.7 MMOL/L (ref 3.7–5.3)
RBC # BLD: 4.61 M/UL (ref 3.95–5.11)
SODIUM BLD-SCNC: 137 MMOL/L (ref 135–144)
WBC # BLD: 6.5 K/UL (ref 3.5–11.3)

## 2020-08-31 PROCEDURE — 0DJ08ZZ INSPECTION OF UPPER INTESTINAL TRACT, VIA NATURAL OR ARTIFICIAL OPENING ENDOSCOPIC: ICD-10-PCS | Performed by: SURGERY

## 2020-08-31 PROCEDURE — 2500000003 HC RX 250 WO HCPCS: Performed by: ANESTHESIOLOGY

## 2020-08-31 PROCEDURE — 2700000000 HC OXYGEN THERAPY PER DAY

## 2020-08-31 PROCEDURE — 6370000000 HC RX 637 (ALT 250 FOR IP): Performed by: STUDENT IN AN ORGANIZED HEALTH CARE EDUCATION/TRAINING PROGRAM

## 2020-08-31 PROCEDURE — 3600000019 HC SURGERY ROBOT ADDTL 15MIN: Performed by: SURGERY

## 2020-08-31 PROCEDURE — 7100000001 HC PACU RECOVERY - ADDTL 15 MIN: Performed by: SURGERY

## 2020-08-31 PROCEDURE — 6360000002 HC RX W HCPCS: Performed by: ANESTHESIOLOGY

## 2020-08-31 PROCEDURE — 2580000003 HC RX 258: Performed by: ANESTHESIOLOGY

## 2020-08-31 PROCEDURE — 80048 BASIC METABOLIC PNL TOTAL CA: CPT

## 2020-08-31 PROCEDURE — 3600000009 HC SURGERY ROBOT BASE: Performed by: SURGERY

## 2020-08-31 PROCEDURE — 94640 AIRWAY INHALATION TREATMENT: CPT

## 2020-08-31 PROCEDURE — 6370000000 HC RX 637 (ALT 250 FOR IP): Performed by: SURGERY

## 2020-08-31 PROCEDURE — 7100000000 HC PACU RECOVERY - FIRST 15 MIN: Performed by: SURGERY

## 2020-08-31 PROCEDURE — 88307 TISSUE EXAM BY PATHOLOGIST: CPT

## 2020-08-31 PROCEDURE — 43775 LAP SLEEVE GASTRECTOMY: CPT | Performed by: SURGERY

## 2020-08-31 PROCEDURE — 51798 US URINE CAPACITY MEASURE: CPT

## 2020-08-31 PROCEDURE — 2720000010 HC SURG SUPPLY STERILE: Performed by: SURGERY

## 2020-08-31 PROCEDURE — 8E0W4CZ ROBOTIC ASSISTED PROCEDURE OF TRUNK REGION, PERCUTANEOUS ENDOSCOPIC APPROACH: ICD-10-PCS | Performed by: SURGERY

## 2020-08-31 PROCEDURE — 3E0T3BZ INTRODUCTION OF ANESTHETIC AGENT INTO PERIPHERAL NERVES AND PLEXI, PERCUTANEOUS APPROACH: ICD-10-PCS | Performed by: ANESTHESIOLOGY

## 2020-08-31 PROCEDURE — 2580000003 HC RX 258: Performed by: SURGERY

## 2020-08-31 PROCEDURE — 51701 INSERT BLADDER CATHETER: CPT

## 2020-08-31 PROCEDURE — 3700000001 HC ADD 15 MINUTES (ANESTHESIA): Performed by: SURGERY

## 2020-08-31 PROCEDURE — S2900 ROBOTIC SURGICAL SYSTEM: HCPCS | Performed by: SURGERY

## 2020-08-31 PROCEDURE — 1200000000 HC SEMI PRIVATE

## 2020-08-31 PROCEDURE — 2500000003 HC RX 250 WO HCPCS: Performed by: SURGERY

## 2020-08-31 PROCEDURE — 0BQT4ZZ REPAIR DIAPHRAGM, PERCUTANEOUS ENDOSCOPIC APPROACH: ICD-10-PCS | Performed by: SURGERY

## 2020-08-31 PROCEDURE — 2500000003 HC RX 250 WO HCPCS: Performed by: NURSE ANESTHETIST, CERTIFIED REGISTERED

## 2020-08-31 PROCEDURE — 82947 ASSAY GLUCOSE BLOOD QUANT: CPT

## 2020-08-31 PROCEDURE — 94660 CPAP INITIATION&MGMT: CPT

## 2020-08-31 PROCEDURE — 85027 COMPLETE CBC AUTOMATED: CPT

## 2020-08-31 PROCEDURE — 2709999900 HC NON-CHARGEABLE SUPPLY: Performed by: SURGERY

## 2020-08-31 PROCEDURE — 6360000002 HC RX W HCPCS: Performed by: SURGERY

## 2020-08-31 PROCEDURE — 6360000002 HC RX W HCPCS: Performed by: NURSE ANESTHETIST, CERTIFIED REGISTERED

## 2020-08-31 PROCEDURE — 3700000000 HC ANESTHESIA ATTENDED CARE: Performed by: SURGERY

## 2020-08-31 PROCEDURE — 43281 LAP PARAESOPHAG HERN REPAIR: CPT | Performed by: SURGERY

## 2020-08-31 PROCEDURE — 0DB64Z3 EXCISION OF STOMACH, PERCUTANEOUS ENDOSCOPIC APPROACH, VERTICAL: ICD-10-PCS | Performed by: SURGERY

## 2020-08-31 PROCEDURE — 94761 N-INVAS EAR/PLS OXIMETRY MLT: CPT

## 2020-08-31 PROCEDURE — 64488 TAP BLOCK BI INJECTION: CPT | Performed by: ANESTHESIOLOGY

## 2020-08-31 RX ORDER — HYDROCODONE BITARTRATE AND ACETAMINOPHEN 5; 325 MG/1; MG/1
1 TABLET ORAL EVERY 6 HOURS PRN
Qty: 28 TABLET | Refills: 0 | Status: SHIPPED | OUTPATIENT
Start: 2020-08-31 | End: 2020-09-07

## 2020-08-31 RX ORDER — HEPARIN SODIUM 5000 [USP'U]/ML
5000 INJECTION, SOLUTION INTRAVENOUS; SUBCUTANEOUS ONCE
Status: COMPLETED | OUTPATIENT
Start: 2020-08-31 | End: 2020-08-31

## 2020-08-31 RX ORDER — SODIUM CHLORIDE 0.9 % (FLUSH) 0.9 %
10 SYRINGE (ML) INJECTION PRN
Status: DISCONTINUED | OUTPATIENT
Start: 2020-08-31 | End: 2020-08-31 | Stop reason: HOSPADM

## 2020-08-31 RX ORDER — FENTANYL CITRATE 50 UG/ML
INJECTION, SOLUTION INTRAMUSCULAR; INTRAVENOUS PRN
Status: DISCONTINUED | OUTPATIENT
Start: 2020-08-31 | End: 2020-08-31 | Stop reason: SDUPTHER

## 2020-08-31 RX ORDER — HYDROCODONE BITARTRATE AND ACETAMINOPHEN 5; 325 MG/1; MG/1
1 TABLET ORAL EVERY 6 HOURS PRN
Status: DISCONTINUED | OUTPATIENT
Start: 2020-08-31 | End: 2020-09-01

## 2020-08-31 RX ORDER — EPHEDRINE SULFATE/0.9% NACL/PF 50 MG/5 ML
SYRINGE (ML) INTRAVENOUS PRN
Status: DISCONTINUED | OUTPATIENT
Start: 2020-08-31 | End: 2020-08-31 | Stop reason: SDUPTHER

## 2020-08-31 RX ORDER — GLYCOPYRROLATE 1 MG/5 ML
SYRINGE (ML) INTRAVENOUS PRN
Status: DISCONTINUED | OUTPATIENT
Start: 2020-08-31 | End: 2020-08-31 | Stop reason: SDUPTHER

## 2020-08-31 RX ORDER — METOPROLOL TARTRATE 5 MG/5ML
5 INJECTION INTRAVENOUS EVERY 6 HOURS PRN
Status: DISCONTINUED | OUTPATIENT
Start: 2020-08-31 | End: 2020-09-01 | Stop reason: HOSPADM

## 2020-08-31 RX ORDER — PROPOFOL 10 MG/ML
INJECTION, EMULSION INTRAVENOUS PRN
Status: DISCONTINUED | OUTPATIENT
Start: 2020-08-31 | End: 2020-08-31 | Stop reason: SDUPTHER

## 2020-08-31 RX ORDER — SODIUM CHLORIDE 0.9 % (FLUSH) 0.9 %
10 SYRINGE (ML) INJECTION PRN
Status: DISCONTINUED | OUTPATIENT
Start: 2020-08-31 | End: 2020-09-01 | Stop reason: HOSPADM

## 2020-08-31 RX ORDER — SODIUM CHLORIDE 9 MG/ML
125 INJECTION, SOLUTION INTRAVENOUS CONTINUOUS
Status: DISCONTINUED | OUTPATIENT
Start: 2020-08-31 | End: 2020-08-31

## 2020-08-31 RX ORDER — HEPARIN SODIUM 5000 [USP'U]/ML
5000 INJECTION, SOLUTION INTRAVENOUS; SUBCUTANEOUS EVERY 8 HOURS SCHEDULED
Status: DISCONTINUED | OUTPATIENT
Start: 2020-08-31 | End: 2020-09-01 | Stop reason: HOSPADM

## 2020-08-31 RX ORDER — SODIUM CHLORIDE, SODIUM LACTATE, POTASSIUM CHLORIDE, CALCIUM CHLORIDE 600; 310; 30; 20 MG/100ML; MG/100ML; MG/100ML; MG/100ML
INJECTION, SOLUTION INTRAVENOUS CONTINUOUS
Status: DISCONTINUED | OUTPATIENT
Start: 2020-08-31 | End: 2020-08-31

## 2020-08-31 RX ORDER — ONDANSETRON 2 MG/ML
4 INJECTION INTRAMUSCULAR; INTRAVENOUS
Status: DISCONTINUED | OUTPATIENT
Start: 2020-08-31 | End: 2020-08-31 | Stop reason: HOSPADM

## 2020-08-31 RX ORDER — BUPIVACAINE HYDROCHLORIDE 5 MG/ML
40 INJECTION, SOLUTION EPIDURAL; INTRACAUDAL ONCE
Status: COMPLETED | OUTPATIENT
Start: 2020-08-31 | End: 2020-08-31

## 2020-08-31 RX ORDER — MAGNESIUM HYDROXIDE 1200 MG/15ML
LIQUID ORAL CONTINUOUS PRN
Status: COMPLETED | OUTPATIENT
Start: 2020-08-31 | End: 2020-08-31

## 2020-08-31 RX ORDER — KETOROLAC TROMETHAMINE 30 MG/ML
30 INJECTION, SOLUTION INTRAMUSCULAR; INTRAVENOUS EVERY 6 HOURS PRN
Status: ON HOLD | COMMUNITY
End: 2020-08-31 | Stop reason: HOSPADM

## 2020-08-31 RX ORDER — ONDANSETRON 2 MG/ML
4 INJECTION INTRAMUSCULAR; INTRAVENOUS ONCE
Status: DISCONTINUED | OUTPATIENT
Start: 2020-08-31 | End: 2020-08-31 | Stop reason: HOSPADM

## 2020-08-31 RX ORDER — SODIUM CHLORIDE 0.9 % (FLUSH) 0.9 %
10 SYRINGE (ML) INJECTION EVERY 12 HOURS SCHEDULED
Status: DISCONTINUED | OUTPATIENT
Start: 2020-08-31 | End: 2020-08-31 | Stop reason: HOSPADM

## 2020-08-31 RX ORDER — PROMETHAZINE HYDROCHLORIDE 25 MG/1
25 TABLET ORAL EVERY 6 HOURS PRN
Qty: 30 TABLET | Refills: 0 | Status: SHIPPED | OUTPATIENT
Start: 2020-08-31 | End: 2020-09-01 | Stop reason: HOSPADM

## 2020-08-31 RX ORDER — MIDAZOLAM HYDROCHLORIDE 1 MG/ML
INJECTION INTRAMUSCULAR; INTRAVENOUS PRN
Status: DISCONTINUED | OUTPATIENT
Start: 2020-08-31 | End: 2020-08-31 | Stop reason: SDUPTHER

## 2020-08-31 RX ORDER — BUPIVACAINE HYDROCHLORIDE 5 MG/ML
INJECTION, SOLUTION EPIDURAL; INTRACAUDAL
Status: COMPLETED | OUTPATIENT
Start: 2020-08-31 | End: 2020-08-31

## 2020-08-31 RX ORDER — ONDANSETRON 2 MG/ML
4 INJECTION INTRAMUSCULAR; INTRAVENOUS EVERY 6 HOURS PRN
Status: DISCONTINUED | OUTPATIENT
Start: 2020-08-31 | End: 2020-09-01 | Stop reason: HOSPADM

## 2020-08-31 RX ORDER — MIDAZOLAM HYDROCHLORIDE 1 MG/ML
2 INJECTION INTRAMUSCULAR; INTRAVENOUS ONCE
Status: COMPLETED | OUTPATIENT
Start: 2020-08-31 | End: 2020-08-31

## 2020-08-31 RX ORDER — SODIUM CHLORIDE, SODIUM LACTATE, POTASSIUM CHLORIDE, CALCIUM CHLORIDE 600; 310; 30; 20 MG/100ML; MG/100ML; MG/100ML; MG/100ML
1000 INJECTION, SOLUTION INTRAVENOUS CONTINUOUS
Status: DISCONTINUED | OUTPATIENT
Start: 2020-08-31 | End: 2020-08-31 | Stop reason: SDUPTHER

## 2020-08-31 RX ORDER — SCOLOPAMINE TRANSDERMAL SYSTEM 1 MG/1
1 PATCH, EXTENDED RELEASE TRANSDERMAL
Status: DISCONTINUED | OUTPATIENT
Start: 2020-08-31 | End: 2020-09-01 | Stop reason: HOSPADM

## 2020-08-31 RX ORDER — FENTANYL CITRATE 50 UG/ML
50 INJECTION, SOLUTION INTRAMUSCULAR; INTRAVENOUS ONCE
Status: COMPLETED | OUTPATIENT
Start: 2020-08-31 | End: 2020-08-31

## 2020-08-31 RX ORDER — FENTANYL CITRATE 50 UG/ML
25 INJECTION, SOLUTION INTRAMUSCULAR; INTRAVENOUS ONCE
Status: DISCONTINUED | OUTPATIENT
Start: 2020-08-31 | End: 2020-08-31 | Stop reason: SDUPTHER

## 2020-08-31 RX ORDER — NEOSTIGMINE METHYLSULFATE 5 MG/5 ML
SYRINGE (ML) INTRAVENOUS PRN
Status: DISCONTINUED | OUTPATIENT
Start: 2020-08-31 | End: 2020-08-31 | Stop reason: SDUPTHER

## 2020-08-31 RX ORDER — SODIUM CHLORIDE 0.9 % (FLUSH) 0.9 %
10 SYRINGE (ML) INJECTION EVERY 12 HOURS SCHEDULED
Status: DISCONTINUED | OUTPATIENT
Start: 2020-08-31 | End: 2020-09-01 | Stop reason: HOSPADM

## 2020-08-31 RX ORDER — SODIUM CHLORIDE AND POTASSIUM CHLORIDE .9; .15 G/100ML; G/100ML
SOLUTION INTRAVENOUS CONTINUOUS
Status: DISCONTINUED | OUTPATIENT
Start: 2020-08-31 | End: 2020-09-01 | Stop reason: HOSPADM

## 2020-08-31 RX ORDER — ONDANSETRON 2 MG/ML
INJECTION INTRAMUSCULAR; INTRAVENOUS PRN
Status: DISCONTINUED | OUTPATIENT
Start: 2020-08-31 | End: 2020-08-31 | Stop reason: SDUPTHER

## 2020-08-31 RX ORDER — LIDOCAINE HYDROCHLORIDE 10 MG/ML
INJECTION, SOLUTION EPIDURAL; INFILTRATION; INTRACAUDAL; PERINEURAL PRN
Status: DISCONTINUED | OUTPATIENT
Start: 2020-08-31 | End: 2020-08-31 | Stop reason: SDUPTHER

## 2020-08-31 RX ORDER — FENTANYL CITRATE 50 UG/ML
50 INJECTION, SOLUTION INTRAMUSCULAR; INTRAVENOUS EVERY 5 MIN PRN
Status: COMPLETED | OUTPATIENT
Start: 2020-08-31 | End: 2020-08-31

## 2020-08-31 RX ORDER — ROCURONIUM BROMIDE 10 MG/ML
INJECTION, SOLUTION INTRAVENOUS PRN
Status: DISCONTINUED | OUTPATIENT
Start: 2020-08-31 | End: 2020-08-31 | Stop reason: SDUPTHER

## 2020-08-31 RX ORDER — MIDAZOLAM HYDROCHLORIDE 1 MG/ML
2 INJECTION INTRAMUSCULAR; INTRAVENOUS
Status: DISCONTINUED | OUTPATIENT
Start: 2020-08-31 | End: 2020-08-31 | Stop reason: HOSPADM

## 2020-08-31 RX ORDER — PROMETHAZINE HYDROCHLORIDE 25 MG/ML
12.5 INJECTION, SOLUTION INTRAMUSCULAR; INTRAVENOUS EVERY 6 HOURS PRN
Status: DISCONTINUED | OUTPATIENT
Start: 2020-08-31 | End: 2020-09-01 | Stop reason: HOSPADM

## 2020-08-31 RX ORDER — IPRATROPIUM BROMIDE AND ALBUTEROL SULFATE 2.5; .5 MG/3ML; MG/3ML
1 SOLUTION RESPIRATORY (INHALATION)
Status: DISCONTINUED | OUTPATIENT
Start: 2020-08-31 | End: 2020-09-01 | Stop reason: HOSPADM

## 2020-08-31 RX ADMIN — Medication 3 MG: at 13:41

## 2020-08-31 RX ADMIN — FAMOTIDINE 20 MG: 10 INJECTION INTRAVENOUS at 21:02

## 2020-08-31 RX ADMIN — Medication 10 MG: at 11:56

## 2020-08-31 RX ADMIN — HYDROMORPHONE HYDROCHLORIDE 1 MG: 1 INJECTION, SOLUTION INTRAMUSCULAR; INTRAVENOUS; SUBCUTANEOUS at 16:27

## 2020-08-31 RX ADMIN — Medication 0.4 MG: at 13:41

## 2020-08-31 RX ADMIN — HEPARIN SODIUM 5000 UNITS: 5000 INJECTION INTRAVENOUS; SUBCUTANEOUS at 09:19

## 2020-08-31 RX ADMIN — ONDANSETRON 4 MG: 2 INJECTION, SOLUTION INTRAMUSCULAR; INTRAVENOUS at 13:36

## 2020-08-31 RX ADMIN — PHENYLEPHRINE HYDROCHLORIDE 100 MCG: 10 INJECTION INTRAVENOUS at 11:45

## 2020-08-31 RX ADMIN — FENTANYL CITRATE 50 MCG: 50 INJECTION, SOLUTION INTRAMUSCULAR; INTRAVENOUS at 14:44

## 2020-08-31 RX ADMIN — HYDROMORPHONE HYDROCHLORIDE 1 MG: 1 INJECTION, SOLUTION INTRAMUSCULAR; INTRAVENOUS; SUBCUTANEOUS at 21:59

## 2020-08-31 RX ADMIN — MIDAZOLAM HYDROCHLORIDE 2 MG: 1 INJECTION, SOLUTION INTRAMUSCULAR; INTRAVENOUS at 09:30

## 2020-08-31 RX ADMIN — ROCURONIUM BROMIDE 50 MG: 10 INJECTION INTRAVENOUS at 11:26

## 2020-08-31 RX ADMIN — FENTANYL CITRATE 50 MCG: 50 INJECTION, SOLUTION INTRAMUSCULAR; INTRAVENOUS at 09:29

## 2020-08-31 RX ADMIN — PHENYLEPHRINE HYDROCHLORIDE 100 MCG: 10 INJECTION INTRAVENOUS at 11:56

## 2020-08-31 RX ADMIN — MIDAZOLAM HYDROCHLORIDE 2 MG: 1 INJECTION, SOLUTION INTRAMUSCULAR; INTRAVENOUS at 11:21

## 2020-08-31 RX ADMIN — HYDROCODONE BITARTRATE AND ACETAMINOPHEN 1 TABLET: 5; 325 TABLET ORAL at 18:56

## 2020-08-31 RX ADMIN — IPRATROPIUM BROMIDE AND ALBUTEROL SULFATE 1 AMPULE: .5; 3 SOLUTION RESPIRATORY (INHALATION) at 16:17

## 2020-08-31 RX ADMIN — BUPIVACAINE HYDROCHLORIDE 40 ML: 5 INJECTION, SOLUTION EPIDURAL; INTRACAUDAL; PERINEURAL at 11:40

## 2020-08-31 RX ADMIN — POTASSIUM CHLORIDE AND SODIUM CHLORIDE: 900; 150 INJECTION, SOLUTION INTRAVENOUS at 16:27

## 2020-08-31 RX ADMIN — FENTANYL CITRATE 100 MCG: 50 INJECTION, SOLUTION INTRAMUSCULAR; INTRAVENOUS at 11:26

## 2020-08-31 RX ADMIN — PHENYLEPHRINE HYDROCHLORIDE 100 MCG: 10 INJECTION INTRAVENOUS at 12:55

## 2020-08-31 RX ADMIN — FENTANYL CITRATE 50 MCG: 50 INJECTION, SOLUTION INTRAMUSCULAR; INTRAVENOUS at 14:05

## 2020-08-31 RX ADMIN — Medication 10 MG: at 11:54

## 2020-08-31 RX ADMIN — SODIUM CHLORIDE, POTASSIUM CHLORIDE, SODIUM LACTATE AND CALCIUM CHLORIDE: 600; 310; 30; 20 INJECTION, SOLUTION INTRAVENOUS at 12:08

## 2020-08-31 RX ADMIN — PROPOFOL 100 MG: 10 INJECTION, EMULSION INTRAVENOUS at 11:26

## 2020-08-31 RX ADMIN — FENTANYL CITRATE 50 MCG: 50 INJECTION, SOLUTION INTRAMUSCULAR; INTRAVENOUS at 14:25

## 2020-08-31 RX ADMIN — PROPOFOL 50 MG: 10 INJECTION, EMULSION INTRAVENOUS at 11:27

## 2020-08-31 RX ADMIN — Medication 0.2 MG: at 11:47

## 2020-08-31 RX ADMIN — Medication 200 MG: at 09:33

## 2020-08-31 RX ADMIN — SODIUM CHLORIDE, POTASSIUM CHLORIDE, SODIUM LACTATE AND CALCIUM CHLORIDE 1000 ML: 600; 310; 30; 20 INJECTION, SOLUTION INTRAVENOUS at 09:18

## 2020-08-31 RX ADMIN — ROCURONIUM BROMIDE 20 MG: 10 INJECTION INTRAVENOUS at 12:46

## 2020-08-31 RX ADMIN — SODIUM CHLORIDE, POTASSIUM CHLORIDE, SODIUM LACTATE AND CALCIUM CHLORIDE: 600; 310; 30; 20 INJECTION, SOLUTION INTRAVENOUS at 11:21

## 2020-08-31 RX ADMIN — FENTANYL CITRATE 50 MCG: 50 INJECTION, SOLUTION INTRAMUSCULAR; INTRAVENOUS at 14:59

## 2020-08-31 RX ADMIN — LIDOCAINE HYDROCHLORIDE 50 MG: 10 INJECTION, SOLUTION EPIDURAL; INFILTRATION; INTRACAUDAL; PERINEURAL at 11:26

## 2020-08-31 RX ADMIN — IPRATROPIUM BROMIDE AND ALBUTEROL SULFATE 1 AMPULE: .5; 3 SOLUTION RESPIRATORY (INHALATION) at 20:38

## 2020-08-31 RX ADMIN — CEFAZOLIN 2 G: 10 INJECTION, POWDER, FOR SOLUTION INTRAVENOUS at 11:36

## 2020-08-31 ASSESSMENT — PULMONARY FUNCTION TESTS
PIF_VALUE: 21
PIF_VALUE: 15
PIF_VALUE: 22
PIF_VALUE: 30
PIF_VALUE: 22
PIF_VALUE: 23
PIF_VALUE: 22
PIF_VALUE: 24
PIF_VALUE: 15
PIF_VALUE: 1
PIF_VALUE: 25
PIF_VALUE: 26
PIF_VALUE: 20
PIF_VALUE: 22
PIF_VALUE: 22
PIF_VALUE: 1
PIF_VALUE: 23
PIF_VALUE: 22
PIF_VALUE: 20
PIF_VALUE: 17
PIF_VALUE: 26
PIF_VALUE: 16
PIF_VALUE: 22
PIF_VALUE: 24
PIF_VALUE: 16
PIF_VALUE: 26
PIF_VALUE: 25
PIF_VALUE: 21
PIF_VALUE: 16
PIF_VALUE: 22
PIF_VALUE: 22
PIF_VALUE: 15
PIF_VALUE: 14
PIF_VALUE: 22
PIF_VALUE: 24
PIF_VALUE: 22
PIF_VALUE: 28
PIF_VALUE: 22
PIF_VALUE: 22
PIF_VALUE: 23
PIF_VALUE: 24
PIF_VALUE: 22
PIF_VALUE: 11
PIF_VALUE: 14
PIF_VALUE: 17
PIF_VALUE: 1
PIF_VALUE: 22
PIF_VALUE: 21
PIF_VALUE: 26
PIF_VALUE: 16
PIF_VALUE: 14
PIF_VALUE: 22
PIF_VALUE: 15
PIF_VALUE: 22
PIF_VALUE: 25
PIF_VALUE: 22
PIF_VALUE: 1
PIF_VALUE: 22
PIF_VALUE: 22
PIF_VALUE: 27
PIF_VALUE: 16
PIF_VALUE: 14
PIF_VALUE: 18
PIF_VALUE: 16
PIF_VALUE: 3
PIF_VALUE: 22
PIF_VALUE: 22
PIF_VALUE: 24
PIF_VALUE: 22
PIF_VALUE: 1
PIF_VALUE: 13
PIF_VALUE: 5
PIF_VALUE: 22
PIF_VALUE: 22
PIF_VALUE: 25
PIF_VALUE: 22
PIF_VALUE: 26
PIF_VALUE: 25
PIF_VALUE: 15
PIF_VALUE: 4
PIF_VALUE: 22
PIF_VALUE: 1
PIF_VALUE: 21
PIF_VALUE: 0
PIF_VALUE: 22
PIF_VALUE: 22
PIF_VALUE: 20
PIF_VALUE: 22
PIF_VALUE: 22
PIF_VALUE: 25
PIF_VALUE: 22
PIF_VALUE: 26
PIF_VALUE: 23
PIF_VALUE: 27
PIF_VALUE: 1
PIF_VALUE: 27
PIF_VALUE: 24
PIF_VALUE: 22
PIF_VALUE: 15
PIF_VALUE: 22
PIF_VALUE: 1
PIF_VALUE: 21
PIF_VALUE: 16
PIF_VALUE: 21
PIF_VALUE: 22
PIF_VALUE: 22
PIF_VALUE: 24
PIF_VALUE: 25
PIF_VALUE: 15
PIF_VALUE: 21
PIF_VALUE: 15
PIF_VALUE: 14
PIF_VALUE: 22
PIF_VALUE: 21
PIF_VALUE: 16
PIF_VALUE: 23
PIF_VALUE: 25
PIF_VALUE: 16
PIF_VALUE: 19
PIF_VALUE: 15
PIF_VALUE: 23
PIF_VALUE: 15
PIF_VALUE: 24
PIF_VALUE: 22
PIF_VALUE: 24
PIF_VALUE: 23
PIF_VALUE: 16
PIF_VALUE: 22
PIF_VALUE: 15
PIF_VALUE: 14
PIF_VALUE: 15
PIF_VALUE: 23
PIF_VALUE: 22
PIF_VALUE: 4
PIF_VALUE: 14
PIF_VALUE: 16
PIF_VALUE: 5
PIF_VALUE: 1
PIF_VALUE: 22
PIF_VALUE: 21
PIF_VALUE: 23
PIF_VALUE: 27
PIF_VALUE: 22
PIF_VALUE: 27
PIF_VALUE: 22
PIF_VALUE: 25
PIF_VALUE: 24
PIF_VALUE: 22

## 2020-08-31 ASSESSMENT — PAIN SCALES - GENERAL
PAINLEVEL_OUTOF10: 10
PAINLEVEL_OUTOF10: 8
PAINLEVEL_OUTOF10: 5
PAINLEVEL_OUTOF10: 4
PAINLEVEL_OUTOF10: 10
PAINLEVEL_OUTOF10: 5
PAINLEVEL_OUTOF10: 4
PAINLEVEL_OUTOF10: 10
PAINLEVEL_OUTOF10: 8

## 2020-08-31 ASSESSMENT — LIFESTYLE VARIABLES: SMOKING_STATUS: 0

## 2020-08-31 ASSESSMENT — PAIN DESCRIPTION - PAIN TYPE: TYPE: SURGICAL PAIN

## 2020-08-31 ASSESSMENT — PAIN - FUNCTIONAL ASSESSMENT: PAIN_FUNCTIONAL_ASSESSMENT: 0-10

## 2020-08-31 ASSESSMENT — PAIN DESCRIPTION - LOCATION: LOCATION: ABDOMEN

## 2020-08-31 ASSESSMENT — PAIN DESCRIPTION - ORIENTATION: ORIENTATION: MID

## 2020-08-31 NOTE — ANESTHESIA POSTPROCEDURE EVALUATION
Department of Anesthesiology  Postprocedure Note    Patient: Ashwini Sarabia  MRN: [id number]  [place]: 1955  Date of evaluation: 8/31/2020  Time:  2:20 PM     Procedure Summary     Date:  08/31/20 Room / Location:  [address]    Anesthesia Start:  1559 Anesthesia Stop:  5808    Procedure:  XI LAPAROSCOPIC ROBOTIC GASTRECTOMY SLEEVE,  hiatal hernia repair, EGD (N/A ) Diagnosis:  (OBESITY, GERD, HYPERLIPIDEMIA)    Surgeon:  Esperanza Enriquez DO Responsible Provider:  Callie Balderas MD    Anesthesia Type:  general, regional ASA Status:  3          Anesthesia Type: general, regional    Connie Phase I: Connie Score: 8    Connie Phase II:      Last vitals: Reviewed and per EMR flowsheets.        Anesthesia Post Evaluation    Patient location during evaluation: PACU  Patient participation: complete - patient participated  Level of consciousness: awake  Pain score: 3  Airway patency: patent  Nausea & Vomiting: no vomiting and no nausea  Complications: no  Cardiovascular status: blood pressure returned to baseline  Respiratory status: acceptable  Hydration status: euvolemic

## 2020-08-31 NOTE — BRIEF OP NOTE
Brief Postoperative Note      Patient: Vianney Adjutant  YOB: 1955  MRN: [id number]    Date of Procedure: 8/31/2020    Pre-Op Diagnosis: OBESITY, GERD, HYPERLIPIDEMIA    Post-Op Diagnosis: Same       Procedure(s):  XI LAPAROSCOPIC ROBOTIC GASTRECTOMY SLEEVE,  hiatal hernia repair, EGD    Surgeon(s):  Lopez Doyle DO    Assistant:  First Assistant: Devin Madrigal  Resident: Jamey Grande MD    Anesthesia: General    Estimated Blood Loss (mL): Minimal    Complications: None    Specimens:   ID Type Source Tests Collected by Time Destination   A : gastric remnant  Tissue Gastric SURGICAL PATHOLOGY Lopez Doyle DO 8/31/2020 1223        Implants:  * No implants in log *      Drains: * No LDAs found *    Findings: hiatal    Electronically signed by Lopez Doyle DO on 8/31/2020 at 1:33 PM

## 2020-08-31 NOTE — PROGRESS NOTES
[id number] timeout for rectus sheath block. Patient was sedated with versed 2 mg iv et fentanyl 50 mcg iv.block was completed per Dr Rosemarie Stephen with marcaine 0.5% 40 ml, 20 ml per side. Vital signs were stable. Tolerated well. Resting comfortably with family after block.

## 2020-08-31 NOTE — PLAN OF CARE
Problem: Discharge Planning:  Goal: Discharged to appropriate level of care  Description: Discharged to appropriate level of care  Outcome: Ongoing     Problem:  Bowel Function - Altered:  Goal: Bowel elimination is within specified parameters  Description: Bowel elimination is within specified parameters  Outcome: Ongoing     Problem: Fluid Volume - Imbalance:  Goal: Ability to achieve a balanced intake and output will improve  Description: Ability to achieve a balanced intake and output will improve  Outcome: Ongoing     Problem: Infection - Surgical Site:  Goal: Will show no infection signs and symptoms  Description: Will show no infection signs and symptoms  Outcome: Ongoing  Goal: Ability to maintain a body temperature in the normal range will improve  Description: Ability to maintain a body temperature in the normal range will improve  Outcome: Ongoing     Problem: Nutrition Deficit:  Goal: Ability to identify appropriate dietary choices will improve  Description: Ability to identify appropriate dietary choices will improve  Outcome: Ongoing     Problem: Pain:  Goal: Pain level will decrease  Description: Pain level will decrease  Outcome: Ongoing  Goal: Control of acute pain  Description: Control of acute pain  Outcome: Ongoing  Goal: Control of chronic pain  Description: Control of chronic pain  Outcome: Ongoing     Problem: Venous Thromboembolism:  Goal: Will show no signs or symptoms of venous thromboembolism  Description: Will show no signs or symptoms of venous thromboembolism  Outcome: Ongoing  Goal: Absence of signs or symptoms of impaired coagulation  Description: Absence of signs or symptoms of impaired coagulation  Outcome: Ongoing

## 2020-08-31 NOTE — ANESTHESIA PRE PROCEDURE
1,000 mL  1,000 mL Intravenous Continuous Shelai Powers DO        fentaNYL (SUBLIMAZE) injection 50 mcg  50 mcg Intravenous Q5 Min PRN Jaspal Bowers MD        ondansetron [facility]) injection 4 mg  4 mg Intravenous Once PRN Jaspal Bowers MD           Allergies: Allergies   Allergen Reactions    Percocet [Oxycodone-Acetaminophen] Anaphylaxis     Can't breathe    Sulfa Antibiotics Anaphylaxis     Can't breathe    Linzess [Linaclotide] Other (See Comments)     seizure    Tramadol Other (See Comments)     seizure       Problem List:    Patient Active Problem List   Diagnosis Code    HLD (hyperlipidemia) E78.5    Gastroesophageal reflux disease without esophagitis K21.9    History of kidney stones Z87.442    Prediabetes R73.03    Chronic migraine G43.709    Fibromyalgia M79.7    Depression F32.9    Osteoarthritis M19.90    Bilateral chronic knee pain M25.561, M25.562, G89.29    Chronic hip pain, bilateral M25.551, M25.552, G89.29    Marijuana use F12.90    History of pulmonary embolism Z86.711    SAMEERA (obstructive sleep apnea) G47.33    Obesity (BMI 30-39. 9) E66.9    History of marijuana use Z87.898       Past Medical History:        Diagnosis Date    Abdominal pain     Arthritis     Celiac artery compression syndrome (HCC)     Depression     Fibromyalgia     GERD (gastroesophageal reflux disease)     Hx of blood clots     PE after back surgery    Hyperlipidemia     Kidney stones      passed spontaneously    Migraines     Sleep apnea     c-pap instructed to bring Dr. Marilee Zabala DO to see on 20 for clearance       Past Surgical History:        Procedure Laterality Date    BLADDER SUSPENSION       SECTION      COLONOSCOPY      CYSTOSCOPY Right 5/12/15    with laser litho and rt. stent insertion    JOINT REPLACEMENT      LASIK Bilateral     LUMBAR FUSION      OTHER SURGICAL HISTORY      surgery for celiac artery compression syndrome    SHOULDER ARTHROPLASTY Right     TONSILLECTOMY      TUBAL LIGATION      UPPER GASTROINTESTINAL ENDOSCOPY      UPPER GASTROINTESTINAL ENDOSCOPY N/A 2019    EGD BIOPSY performed by Win Pearson MD at [facility]    WRIST ARTHROPLASTY Left        Social History:    Social History     Tobacco Use    Smoking status: Former Smoker     Packs/day: 0.25     Years: 10.00     Pack years: 2.50     Types: Cigarettes     Last attempt to quit: 10/22/1978     Years since quittin.8    Smokeless tobacco: Never Used   Substance Use Topics    Alcohol use: No                                Counseling given: Not Answered      Vital Signs (Current): There were no vitals filed for this visit.                                            BP Readings from Last 3 Encounters:   20 110/64   20 (!) 144/85   03/10/20 132/70       NPO Status:                                                                                 BMI:   Wt Readings from Last 3 Encounters:   20 227 lb (103 kg)   20 234 lb 3.2 oz (106.2 kg)   03/10/20 240 lb (108.9 kg)     There is no height or weight on file to calculate BMI.    CBC:   Lab Results   Component Value Date    WBC 5.4 2020    RBC 4.41 2020    RBC 4.57 2012    HGB 13.7 2020    HCT 40.8 2020    MCV 92.5 2020    RDW 13.1 2020     2020     2012       CMP:   Lab Results   Component Value Date     2020    K 3.9 2020     2020    CO2 24 2020    BUN 14 2020    CREATININE 0.74 2020    GFRAA >60 2020    LABGLOM >60 2020    GLUCOSE 101 2020    GLUCOSE 109 2012    PROT 7.1 10/04/2019    CALCIUM 9.8 2020    BILITOT 0.49 10/04/2019    ALKPHOS 89 10/04/2019    AST 18 10/04/2019    ALT 15 10/04/2019       POC Tests: No results for input(s): POCGLU, POCNA, POCK, POCCL, POCBUN, POCHEMO, POCHCT in the last 72 hours.    Coags:   Lab Results   Component Value Date    PROTIME 10.2 08/17/2020    INR 1.0 08/17/2020    APTT 18.6 08/17/2020       HCG (If Applicable): No results found for: PREGTESTUR, PREGSERUM, HCG, HCGQUANT     ABGs: No results found for: PHART, PO2ART, NND9OYH, BHP1LJG, BEART, K0BHWSFO     Type & Screen (If Applicable):  No results found for: LABABO, [address]    Anesthesia Evaluation  Patient summary reviewed no history of anesthetic complications:   Airway: Mallampati: IV  TM distance: <3 FB   Neck ROM: full  Mouth opening: < 3 FB Dental: normal exam         Pulmonary:normal exam    (+) sleep apnea: on CPAP,      (-) not a current smoker          Patient did not smoke on day of surgery. Cardiovascular:  Exercise tolerance: good (>4 METS),   (+) hyperlipidemia         Beta Blocker:  Not on Beta Blocker         Neuro/Psych:   (+) neuromuscular disease:, headaches:, psychiatric history:depression/anxiety             GI/Hepatic/Renal:   (+) GERD:, morbid obesity          Endo/Other:              Pt had PAT visit. Abdominal:           Vascular:                                          Anesthesia Plan      general and regional     ASA 3     (Glidescope)  Induction: intravenous. MIPS: Postoperative opioids intended and Prophylactic antiemetics administered. Anesthetic plan and risks discussed with patient. Plan discussed with CRNA.                   Jadyn Escobar MD   8/31/2020

## 2020-09-01 ENCOUNTER — APPOINTMENT (OUTPATIENT)
Dept: GENERAL RADIOLOGY | Age: 65
DRG: 621 | End: 2020-09-01
Attending: SURGERY
Payer: MEDICARE

## 2020-09-01 VITALS
TEMPERATURE: 97.8 F | WEIGHT: 225.09 LBS | OXYGEN SATURATION: 100 % | HEART RATE: 62 BPM | SYSTOLIC BLOOD PRESSURE: 156 MMHG | BODY MASS INDEX: 37.5 KG/M2 | RESPIRATION RATE: 16 BRPM | HEIGHT: 65 IN | DIASTOLIC BLOOD PRESSURE: 95 MMHG

## 2020-09-01 LAB
ANION GAP SERPL CALCULATED.3IONS-SCNC: 8 MMOL/L (ref 9–17)
BUN BLDV-MCNC: 9 MG/DL (ref 8–23)
BUN/CREAT BLD: ABNORMAL (ref 9–20)
CALCIUM SERPL-MCNC: 8.5 MG/DL (ref 8.6–10.4)
CHLORIDE BLD-SCNC: 108 MMOL/L (ref 98–107)
CO2: 25 MMOL/L (ref 20–31)
CREAT SERPL-MCNC: 0.5 MG/DL (ref 0.5–0.9)
GFR AFRICAN AMERICAN: >60 ML/MIN
GFR NON-AFRICAN AMERICAN: >60 ML/MIN
GFR SERPL CREATININE-BSD FRML MDRD: ABNORMAL ML/MIN/{1.73_M2}
GFR SERPL CREATININE-BSD FRML MDRD: ABNORMAL ML/MIN/{1.73_M2}
GLUCOSE BLD-MCNC: 95 MG/DL (ref 70–99)
HCT VFR BLD CALC: 36 % (ref 36.3–47.1)
HEMOGLOBIN: 12.1 G/DL (ref 11.9–15.1)
MCH RBC QN AUTO: 30.9 PG (ref 25.2–33.5)
MCHC RBC AUTO-ENTMCNC: 33.6 G/DL (ref 28.4–34.8)
MCV RBC AUTO: 92.1 FL (ref 82.6–102.9)
NRBC AUTOMATED: 0 PER 100 WBC
PDW BLD-RTO: 13.3 % (ref 11.8–14.4)
PLATELET # BLD: ABNORMAL K/UL (ref 138–453)
PLATELET, FLUORESCENCE: NORMAL K/UL (ref 138–453)
PLATELET, IMMATURE FRACTION: NORMAL % (ref 1.1–10.3)
PMV BLD AUTO: ABNORMAL FL (ref 8.1–13.5)
POTASSIUM SERPL-SCNC: 4.2 MMOL/L (ref 3.7–5.3)
RBC # BLD: 3.91 M/UL (ref 3.95–5.11)
SODIUM BLD-SCNC: 141 MMOL/L (ref 135–144)
WBC # BLD: 6.8 K/UL (ref 3.5–11.3)

## 2020-09-01 PROCEDURE — 6370000000 HC RX 637 (ALT 250 FOR IP): Performed by: STUDENT IN AN ORGANIZED HEALTH CARE EDUCATION/TRAINING PROGRAM

## 2020-09-01 PROCEDURE — 6370000000 HC RX 637 (ALT 250 FOR IP): Performed by: SURGERY

## 2020-09-01 PROCEDURE — 2500000003 HC RX 250 WO HCPCS: Performed by: SURGERY

## 2020-09-01 PROCEDURE — 74220 X-RAY XM ESOPHAGUS 1CNTRST: CPT

## 2020-09-01 PROCEDURE — 96374 THER/PROPH/DIAG INJ IV PUSH: CPT

## 2020-09-01 PROCEDURE — 2700000000 HC OXYGEN THERAPY PER DAY

## 2020-09-01 PROCEDURE — 51798 US URINE CAPACITY MEASURE: CPT

## 2020-09-01 PROCEDURE — 94640 AIRWAY INHALATION TREATMENT: CPT

## 2020-09-01 PROCEDURE — 6360000004 HC RX CONTRAST MEDICATION: Performed by: SURGERY

## 2020-09-01 PROCEDURE — 85055 RETICULATED PLATELET ASSAY: CPT

## 2020-09-01 PROCEDURE — 80048 BASIC METABOLIC PNL TOTAL CA: CPT

## 2020-09-01 PROCEDURE — 6360000002 HC RX W HCPCS: Performed by: SURGERY

## 2020-09-01 PROCEDURE — 96372 THER/PROPH/DIAG INJ SC/IM: CPT

## 2020-09-01 PROCEDURE — 94660 CPAP INITIATION&MGMT: CPT

## 2020-09-01 PROCEDURE — BD11YZZ FLUOROSCOPY OF ESOPHAGUS USING OTHER CONTRAST: ICD-10-PCS | Performed by: RADIOLOGY

## 2020-09-01 PROCEDURE — 2580000003 HC RX 258: Performed by: SURGERY

## 2020-09-01 PROCEDURE — 36415 COLL VENOUS BLD VENIPUNCTURE: CPT

## 2020-09-01 PROCEDURE — 94761 N-INVAS EAR/PLS OXIMETRY MLT: CPT

## 2020-09-01 PROCEDURE — 96375 TX/PRO/DX INJ NEW DRUG ADDON: CPT

## 2020-09-01 PROCEDURE — 85027 COMPLETE CBC AUTOMATED: CPT

## 2020-09-01 RX ORDER — ONDANSETRON 4 MG/1
4 TABLET, FILM COATED ORAL EVERY 8 HOURS PRN
Qty: 30 TABLET | Refills: 2 | Status: SHIPPED | OUTPATIENT
Start: 2020-09-01 | End: 2021-03-09 | Stop reason: ALTCHOICE

## 2020-09-01 RX ORDER — HYDROCODONE BITARTRATE AND ACETAMINOPHEN 5; 325 MG/1; MG/1
2 TABLET ORAL EVERY 6 HOURS PRN
Status: DISCONTINUED | OUTPATIENT
Start: 2020-09-01 | End: 2020-09-01 | Stop reason: HOSPADM

## 2020-09-01 RX ORDER — HYDROCODONE BITARTRATE AND ACETAMINOPHEN 5; 325 MG/1; MG/1
1 TABLET ORAL EVERY 6 HOURS PRN
Status: DISCONTINUED | OUTPATIENT
Start: 2020-09-01 | End: 2020-09-01 | Stop reason: HOSPADM

## 2020-09-01 RX ADMIN — HYDROMORPHONE HYDROCHLORIDE 1 MG: 1 INJECTION, SOLUTION INTRAMUSCULAR; INTRAVENOUS; SUBCUTANEOUS at 06:11

## 2020-09-01 RX ADMIN — HYDROCODONE BITARTRATE AND ACETAMINOPHEN 1 TABLET: 5; 325 TABLET ORAL at 10:29

## 2020-09-01 RX ADMIN — POTASSIUM CHLORIDE AND SODIUM CHLORIDE: 900; 150 INJECTION, SOLUTION INTRAVENOUS at 07:58

## 2020-09-01 RX ADMIN — FAMOTIDINE 20 MG: 10 INJECTION INTRAVENOUS at 08:58

## 2020-09-01 RX ADMIN — HYDROCODONE BITARTRATE AND ACETAMINOPHEN 1 TABLET: 5; 325 TABLET ORAL at 15:41

## 2020-09-01 RX ADMIN — HEPARIN SODIUM 5000 UNITS: 5000 INJECTION INTRAVENOUS; SUBCUTANEOUS at 15:41

## 2020-09-01 RX ADMIN — HEPARIN SODIUM 5000 UNITS: 5000 INJECTION INTRAVENOUS; SUBCUTANEOUS at 06:03

## 2020-09-01 RX ADMIN — IOHEXOL 30 ML: 240 INJECTION, SOLUTION INTRATHECAL; INTRAVASCULAR; INTRAVENOUS; ORAL at 09:30

## 2020-09-01 RX ADMIN — IPRATROPIUM BROMIDE AND ALBUTEROL SULFATE 1 AMPULE: .5; 3 SOLUTION RESPIRATORY (INHALATION) at 08:03

## 2020-09-01 RX ADMIN — HYDROCODONE BITARTRATE AND ACETAMINOPHEN 1 TABLET: 5; 325 TABLET ORAL at 03:38

## 2020-09-01 RX ADMIN — Medication 10 ML: at 08:58

## 2020-09-01 ASSESSMENT — PAIN SCALES - GENERAL
PAINLEVEL_OUTOF10: 10
PAINLEVEL_OUTOF10: 8
PAINLEVEL_OUTOF10: 8
PAINLEVEL_OUTOF10: 7
PAINLEVEL_OUTOF10: 6
PAINLEVEL_OUTOF10: 7

## 2020-09-01 NOTE — PROGRESS NOTES
Incentive spirometer, TAZ drain care, and lovenox instructions given to patient and patients . Patient verbally understands.

## 2020-09-01 NOTE — PLAN OF CARE
Problem: Discharge Planning:  Goal: Discharged to appropriate level of care  Description: Discharged to appropriate level of care  9/1/2020 0452 by Dinorah Webster RN  Outcome: Ongoing     Problem:  Bowel Function - Altered:  Goal: Bowel elimination is within specified parameters  Description: Bowel elimination is within specified parameters  9/1/2020 0452 by Dinorah Webster RN  Outcome: Ongoing     Problem: Fluid Volume - Imbalance:  Goal: Ability to achieve a balanced intake and output will improve  Description: Ability to achieve a balanced intake and output will improve  9/1/2020 0452 by Dinorah Webster RN  Outcome: Ongoing

## 2020-09-01 NOTE — CARE COORDINATION
Transitional Planning  Prior Auth required for Phenergan  KEY [id number]  Processed through cover  meds  Case ID [id number]

## 2020-09-01 NOTE — PLAN OF CARE
Problem: Discharge Planning:  Goal: Discharged to appropriate level of care  Description: Discharged to appropriate level of care  9/1/2020 1748 by Tony Welsh RN  Outcome: Completed  9/1/2020 1145 by Gladis Tan RN  Outcome: Ongoing  9/1/2020 0452 by Raven Covington RN  Outcome: Ongoing     Problem:  Bowel Function - Altered:  Goal: Bowel elimination is within specified parameters  Description: Bowel elimination is within specified parameters  9/1/2020 1748 by Tony Welsh RN  Outcome: Completed  9/1/2020 1145 by Gladis Tan RN  Outcome: Ongoing  9/1/2020 0452 by Raven Covington RN  Outcome: Ongoing     Problem: Fluid Volume - Imbalance:  Goal: Ability to achieve a balanced intake and output will improve  Description: Ability to achieve a balanced intake and output will improve  9/1/2020 1748 by Tony Welsh RN  Outcome: Completed  9/1/2020 1145 by Gladis Tan RN  Outcome: Ongoing  9/1/2020 0452 by Raven Covington RN  Outcome: Ongoing     Problem: Infection - Surgical Site:  Goal: Will show no infection signs and symptoms  Description: Will show no infection signs and symptoms  9/1/2020 1748 by Tony Welsh RN  Outcome: Completed  9/1/2020 1145 by Gladis Tan RN  Outcome: Ongoing  Goal: Ability to maintain a body temperature in the normal range will improve  Description: Ability to maintain a body temperature in the normal range will improve  9/1/2020 1748 by Tony Welsh RN  Outcome: Completed  9/1/2020 1145 by Gladis Tan RN  Outcome: Ongoing     Problem: Nutrition Deficit:  Goal: Ability to identify appropriate dietary choices will improve  Description: Ability to identify appropriate dietary choices will improve  9/1/2020 1748 by Tony Welsh RN  Outcome: Completed  9/1/2020 1145 by Gladis Tan RN  Outcome: Ongoing     Problem: Pain:  Goal: Pain level will decrease  Description: Pain level will decrease  9/1/2020 1748 by Tony Welsh RN  Outcome: Completed  9/1/2020 1145 by Ervin Luna RN  Outcome: Ongoing  Goal: Control of acute pain  Description: Control of acute pain  9/1/2020 1748 by Clif Miller RN  Outcome: Completed  9/1/2020 1145 by Ervin Luna RN  Outcome: Ongoing  Goal: Control of chronic pain  Description: Control of chronic pain  9/1/2020 1748 by Clif Miller RN  Outcome: Completed  9/1/2020 1145 by Ervin Luna RN  Outcome: Ongoing     Problem: Venous Thromboembolism:  Goal: Will show no signs or symptoms of venous thromboembolism  Description: Will show no signs or symptoms of venous thromboembolism  9/1/2020 1748 by Clif Miller RN  Outcome: Completed  9/1/2020 1145 by Ervin Luna RN  Outcome: Ongoing  Goal: Absence of signs or symptoms of impaired coagulation  Description: Absence of signs or symptoms of impaired coagulation  9/1/2020 1748 by Clif Miller RN  Outcome: Completed  9/1/2020 1145 by Ervin Luna RN  Outcome: Ongoing     Problem: Pain:  Goal: Pain level will decrease  Description: Pain level will decrease  9/1/2020 1748 by Clif Miller RN  Outcome: Completed  9/1/2020 1145 by Ervin Luna RN  Outcome: Ongoing  Goal: Control of acute pain  Description: Control of acute pain  9/1/2020 1748 by Clif Miller RN  Outcome: Completed  9/1/2020 1145 by Ervin Luna RN  Outcome: Ongoing  Goal: Control of chronic pain  Description: Control of chronic pain  9/1/2020 1748 by Clif Miller RN  Outcome: Completed  9/1/2020 1145 by Ervin Luna RN  Outcome: Ongoing

## 2020-09-01 NOTE — PROGRESS NOTES
Bariatric Surgery Progress Note        PATIENT NAME: Alanna Hill   TODAY'S DATE: 9/1/2020, 6:52 AM  No chief complaint on file. SUBJECTIVE:    Pt seen and examined. No acute issues overnight. Denies any f/c, n/v, sob or chest pain this am. +flatus. Tolerating bariatric CLD. Had to be straight cath x2, has voided on own 1x. TAZ 85 ml  Tmax 37.2    OBJECTIVE:   Vitals:  BP (!) 152/69   Pulse 74   Temp 98.1 °F (36.7 °C) (Oral)   Resp 16   Ht 5' 5\" (1.651 m)   Wt 225 lb 1.4 oz (102.1 kg)   LMP 10/22/2007 (Within Months)   SpO2 97%   BMI 37.46 kg/m²      INTAKE/OUTPUT:      Intake/Output Summary (Last 24 hours) at 9/1/2020 8511  Last data filed at 9/1/2020 0601  Gross per 24 hour   Intake 3324 ml   Output 615 ml   Net 2709 ml                 General: AOx3, NAD  Lungs: Symmetrical chest rise bilaterally, no audible wheezes or rhonchi  Heart: S1S2  Abdomen: Soft, ND, appropriately tender, non peritoneal, no rebound, TAZ intact with SS in bulb    Extremity: moves all extremities x4, No edema    Data Review:  CBC:   Recent Labs     08/31/20  1450 09/01/20  0601   WBC 6.5 6.8   HGB 13.9 12.1    See Reflexed IPF Result     BMP:    Recent Labs     08/31/20  1450      K 3.7      CO2 17*   BUN 22   CREATININE 0.76   GLUCOSE 146*     Hepatic: No results for input(s): AST, ALT, ALB, ALKPHOS, BILITOT, BILIDIR in the last 72 hours. Coagulation: No results for input(s): APTT, PROT, INR in the last 72 hours. ASSESSMENT   Patient Active Problem List   Diagnosis    HLD (hyperlipidemia)    Morbid obesity due to excess calories (HCC)    Gastroesophageal reflux disease without esophagitis    History of kidney stones    Prediabetes    Chronic migraine    Fibromyalgia    Depression    Osteoarthritis    Bilateral chronic knee pain    Chronic hip pain, bilateral    Marijuana use    History of pulmonary embolism    SAMEERA (obstructive sleep apnea)    Obesity (BMI 30-39. 9)    History of marijuana use    Status post laparoscopic sleeve gastrectomy    Hiatal hernia     72 yo F s/p robo sleeve and hiatal hernia repair     PLAN  1. F/u am esophagram  2. Cont supportive care   3. Ok for bariatric clears, jasper drain output, abd binder   4. Encourage ambulation, SCDs and chemical DVT Ppx  5.  Cont aggressive IS use and deep breathing     Thank you,    Electronically signed by Priyanka Gilbert DO  on 9/1/2020 at 6:52 AM

## 2020-09-01 NOTE — PLAN OF CARE
Problem: Discharge Planning:  Goal: Discharged to appropriate level of care  Description: Discharged to appropriate level of care  9/1/2020 1748 by Paul Lopez RN  Outcome: Completed  9/1/2020 1145 by Dinesh Douglas RN  Outcome: Ongoing  9/1/2020 0452 by Buck Garcia RN  Outcome: Ongoing     Problem:  Bowel Function - Altered:  Goal: Bowel elimination is within specified parameters  Description: Bowel elimination is within specified parameters  9/1/2020 1748 by Paul Lopez RN  Outcome: Completed  9/1/2020 1145 by Dinesh Douglas RN  Outcome: Ongoing  9/1/2020 0452 by Buck Garcia RN  Outcome: Ongoing     Problem: Fluid Volume - Imbalance:  Goal: Ability to achieve a balanced intake and output will improve  Description: Ability to achieve a balanced intake and output will improve  9/1/2020 1748 by Paul Lopez RN  Outcome: Completed  9/1/2020 1145 by Dinesh Douglas RN  Outcome: Ongoing  9/1/2020 0452 by Buck Garcia RN  Outcome: Ongoing     Problem: Infection - Surgical Site:  Goal: Will show no infection signs and symptoms  Description: Will show no infection signs and symptoms  9/1/2020 1748 by Paul Lopez RN  Outcome: Completed  9/1/2020 1145 by Dinesh Douglas RN  Outcome: Ongoing  Goal: Ability to maintain a body temperature in the normal range will improve  Description: Ability to maintain a body temperature in the normal range will improve  9/1/2020 1748 by Paul Lopez RN  Outcome: Completed  9/1/2020 1145 by Dinesh Douglas RN  Outcome: Ongoing     Problem: Nutrition Deficit:  Goal: Ability to identify appropriate dietary choices will improve  Description: Ability to identify appropriate dietary choices will improve  9/1/2020 1748 by Paul Lopez RN  Outcome: Completed  9/1/2020 1145 by Dinesh Douglas RN  Outcome: Ongoing     Problem: Pain:  Goal: Pain level will decrease  Description: Pain level will decrease  9/1/2020 1748 by Paul Lopez RN  Outcome: Completed  9/1/2020 1145 by Trey Vega RN  Outcome: Ongoing  Goal: Control of acute pain  Description: Control of acute pain  9/1/2020 1748 by Félix Fried RN  Outcome: Completed  9/1/2020 1145 by Trey Vega RN  Outcome: Ongoing  Goal: Control of chronic pain  Description: Control of chronic pain  9/1/2020 1748 by Félix Fried RN  Outcome: Completed  9/1/2020 1145 by Trey Vega RN  Outcome: Ongoing     Problem: Venous Thromboembolism:  Goal: Will show no signs or symptoms of venous thromboembolism  Description: Will show no signs or symptoms of venous thromboembolism  9/1/2020 1748 by Félix Fried RN  Outcome: Completed  9/1/2020 1145 by Trey Vega RN  Outcome: Ongoing  Goal: Absence of signs or symptoms of impaired coagulation  Description: Absence of signs or symptoms of impaired coagulation  9/1/2020 1748 by Félix Fried RN  Outcome: Completed  9/1/2020 1145 by Trey Vega RN  Outcome: Ongoing     Problem: Pain:  Goal: Pain level will decrease  Description: Pain level will decrease  9/1/2020 1748 by Félix Fried RN  Outcome: Completed  9/1/2020 1145 by Trey Vega RN  Outcome: Ongoing  Goal: Control of acute pain  Description: Control of acute pain  9/1/2020 1748 by Félix Fried RN  Outcome: Completed  9/1/2020 1145 by Trey Vega RN  Outcome: Ongoing  Goal: Control of chronic pain  Description: Control of chronic pain  9/1/2020 1748 by Félix Fried RN  Outcome: Completed  9/1/2020 1145 by Trey Vega RN  Outcome: Ongoing     Problem: Respiratory  Intervention: Respiratory assessment  9/1/2020 0910 by Kyle Brock RCP  Note: BRONCHOSPASM/BRONCHOCONSTRICTION     []         IMPROVE AERATION/BREATH SOUNDS  []   ADMINISTER BRONCHODILATOR THERAPY AS APPROPRIATE  []   ASSESS BREATH SOUNDS  []   IMPLEMENT AEROSOL/MDI PROTOCOL  []   PATIENT EDUCATION AS NEEDED

## 2020-09-01 NOTE — PROGRESS NOTES
CLINICAL PHARMACY NOTE: MEDS TO [address] Select Patient?: No  Total # of Prescriptions Filled: 3   The following medications were delivered to the patient:  · Enoxaparin  · zofran  · norco  Total # of Interventions Completed: 0  Time Spent (min): 0    Additional Documentation:

## 2020-09-01 NOTE — PLAN OF CARE
Problem: Discharge Planning:  Goal: Discharged to appropriate level of care  Description: Discharged to appropriate level of care  9/1/2020 1145 by Geovanna Vasques RN  Outcome: Ongoing  9/1/2020 0452 by Reddy Le RN  Outcome: Ongoing     Problem:  Bowel Function - Altered:  Goal: Bowel elimination is within specified parameters  Description: Bowel elimination is within specified parameters  9/1/2020 1145 by Geovanna Vasques RN  Outcome: Ongoing  9/1/2020 0452 by Reddy Le RN  Outcome: Ongoing     Problem: Fluid Volume - Imbalance:  Goal: Ability to achieve a balanced intake and output will improve  Description: Ability to achieve a balanced intake and output will improve  9/1/2020 1145 by Geovanna Vasques RN  Outcome: Ongoing  9/1/2020 0452 by Reddy eL RN  Outcome: Ongoing     Problem: Infection - Surgical Site:  Goal: Will show no infection signs and symptoms  Description: Will show no infection signs and symptoms  Outcome: Ongoing  Goal: Ability to maintain a body temperature in the normal range will improve  Description: Ability to maintain a body temperature in the normal range will improve  Outcome: Ongoing     Problem: Nutrition Deficit:  Goal: Ability to identify appropriate dietary choices will improve  Description: Ability to identify appropriate dietary choices will improve  Outcome: Ongoing     Problem: Pain:  Goal: Pain level will decrease  Description: Pain level will decrease  Outcome: Ongoing  Goal: Control of acute pain  Description: Control of acute pain  Outcome: Ongoing  Goal: Control of chronic pain  Description: Control of chronic pain  Outcome: Ongoing     Problem: Venous Thromboembolism:  Goal: Will show no signs or symptoms of venous thromboembolism  Description: Will show no signs or symptoms of venous thromboembolism  Outcome: Ongoing  Goal: Absence of signs or symptoms of impaired coagulation  Description: Absence of signs or symptoms of impaired coagulation  Outcome: Ongoing Problem: Pain:  Goal: Pain level will decrease  Description: Pain level will decrease  Outcome: Ongoing  Goal: Control of acute pain  Description: Control of acute pain  Outcome: Ongoing  Goal: Control of chronic pain  Description: Control of chronic pain  Outcome: Ongoing     Problem: Respiratory  Intervention: Respiratory assessment  9/1/2020 0910 by Angelica Salas RCP  Note: BRONCHOSPASM/BRONCHOCONSTRICTION     []         IMPROVE AERATION/BREATH SOUNDS  []   ADMINISTER BRONCHODILATOR THERAPY AS APPROPRIATE  []   ASSESS BREATH SOUNDS  []   IMPLEMENT AEROSOL/MDI PROTOCOL  []   PATIENT EDUCATION AS NEEDED

## 2020-09-02 LAB — SURGICAL PATHOLOGY REPORT: NORMAL

## 2020-09-02 NOTE — DISCHARGE SUMMARY
Bariatric Surgery Discharge Summary    Admit Date:  8/31/2020    Discharge date:   9/1/2020  5:52 PM     DISCHARGE DIAGNOSES:   1. Morbid obesity. 2. Status post  1) Laparoscopic Sleeve Gastrectomy - Robotic Assisted  2) Laparoscopic Hiatal Hernia Repair - Robotic Assisted  2) Esophagogastroduodenoscopy     PRESENTING HISTORY:   The patient is a 71 y.o. female with a longstanding history of morbid   Obesity. She was found to be a good candidate for a Lap sleeve, so   after the proper preoperative process was completed she was brought into the   hospital to undergo this procedure. CONSULTATIONS OBTAINED:   None. HOSPITAL COURSE:   The patient was taken to the operating room on 8/31/2020. She underwent a robotic sleeve which went without difficulty. She was   transferred to the bariatric unit postoperatively. Her postoperative   course was uneventful. By postoperative day number 1 she was tolerating a diet, and ambulating. Her pain was controlled and she was felt to be okay for discharge POD 2    DISCHARGE INSTRUCTIONS:    She will follow up with Dr. Ruslan Oakley in 1 weeks. Follow the instructions as   outlined in your bariatric surgery patient binder. DISCHARGE MEDICATIONS:   She was given prescriptions for percocet and for phenergan     CONDITION ON DISCHARGE:   Satisfactory.

## 2020-09-02 NOTE — OP NOTE
Date of surgery: 8/31/2020     Preoperative diagnoses:   SAMEERA  Morbid Obesity, BMI 37 kg/m2     Postoperative diagnoses:  Same     Procedure:   1) Laparoscopic Sleeve Gastrectomy - Robotic Assisted  2) Laparoscopic Hiatal Hernia Repair - Robotic Assisted  2) Esophagogastroduodenoscopy      Surgeon:  Brandi Boyd DO     Ast: MD Jennifer     Complications:  None     Specimen:  Stomach     Estimated blood loss:   17 cc     Anesthesia:  General     Medications:  Cipro/Flagyl, Heparin SQ, SCD's     Operative indications:  The patient is a pleasant 76y.o. year old female with morbid obesity and a BMI of Body mass index is 37 kg/m². Lourdes Able have had prior episodes of failed attempts at medical weight loss and present today for surgical weight loss. Lourdes Able have associated comorbidities including:  As listed above        Operative narrative:     The patient was taken to the operative suite and administered anesthesia by the anesthetic team.  Next we prepped and draped in normal sterile fashion.  Incision was then made at Mohr's point for a 12 mm port.  The abdomen was surveyed and we entered the abdomen using a optical Visiport trocar of 12 mm in size.  This was accomplished at Mohr's point.  Pneumoperitoneum was then established.  We then placed 4 other ports for sleeve gastrectomy in standard fashion under direct laparoscopic visualization.  Attention was then turned towards placement of the [facility] liver retractor.  Small incision made just inferior to the xiphoid process for the liver retractor.  The liver retractor was then placed under direct laparoscopic visualization in order to facilitate visualization of the stomach and hiatus.  Visible hiatal hernia.     At this time due to a hiatal hernia decision was made to perform hiatal hernia repair.  We mobilized the pars flaccida until the right gena was noted and we could begin our dissection.  Using accommodation of blunt and sharp dissection as well as orientation to prevent staple line formation along the esophageal fibers.  Satisfactory sleeve formation was noted at that time and the staple line was hemostatic.  I then had the bougie removed.  I then reinforced my staple line with 3-0 Vicryl sutures along the length of the staple line at the staple line confluences.       At that time leak test was then started.  The patient was placed in a Trendelenburg position and we irrigated the upper abdomen with saline.  I then passed an Olympus endoscope into the oropharynx down the esophagus under direct visualization.  The scope was passed down through the esophagus down into the lumen of the new gastric sleeve the scope passed easily through the incisura and into the antrum.  The scope was then passed into the duodenum through the pylorus.  The pylorus and duodenum appeared intact and the scope was slowly withdrawn while visualizing the staple line throughout the course of the staple line.  I then clamped distally by applying pressure near the pyloric region to allow for distention of the gastric sleeve.  We then further irrigated and there was no evidence of leak using a bubble test.  The staple line on the inner lumen of the stomach appeared intact and hemostatic throughout.  The scope was slowly withdrawn to the GE junction and no evidence of stricture noted.  The scope was then withdrawn from the esophagus and no other lesion noted.     Attention was then turned back towards the abdomen the abdomen was aspirated of the prior placed saline for leak test.  I then placed an anchoring suture using 3-0 Vicryl suture to prevent torsion of the sleeve and migration of the sleeve superiorly.  I then placed a 19 Western Vianey Smooth drain into the abdomen and positioned this posterior to the stomach and along the staple line.  Specimen was withdrawn from the left upper quadrant incision.  We then irrigated this incision thoroughly with saline.     The drain was noted to be in proper position and again the staple line noted be hemostatic.  Next counts reported to me as correct.     The 12 mm port sites were then closed using a suture passer to pass 0 Vicryl suture under direct laparoscopic visualization for proper fascial reapproximation at each port site.  All ports were then removed and the drain secured into position using a 2-0 silk suture.  Pneumoperitoneum was released in entirety.  The skin was then closed using 4-0 Vicryl subcuticular stitches in interrupted fashion.  The region was cleaned with sterile normal saline followed by placement of TinCoBen and Steri-Strips and sterile bandage.  The drain was connected to bulb suction.  The patient tolerated the procedure well and was transferred to PACU.     The patient's family was updated postoperatively.

## 2020-09-03 ENCOUNTER — TELEPHONE (OUTPATIENT)
Dept: BARIATRICS/WEIGHT MGMT | Age: 65
End: 2020-09-03

## 2020-09-03 NOTE — TELEPHONE ENCOUNTER
Pt reports frequent ambulation around home. Pt wearing abd binder. No complaints of SOB or tachycardia. Laparoscopic incisional sites without problems. Drain with red drainage present, emptying it when she uses the restroom    Pt has not had a BM since surgery. Passing flatus. Agrees to use Milk of Magnesia and monitor for BM. Pt reports urine output of at least 4 times in a 24 hour period. Intake is described as tolerating liquids well, will add in protein drinks too. Rates pain as \"ok\"on a 0-10 scale. Pt using pain medication as directed. Allowed pt the opportunity to ask questions. Reminded patient to reference the patient education materials as needed. Reminded pt that they may phone the office or the \"after hours telephone number\"  that is listed in the patient education binder as needed. Pt verbalized understanding. Pt without concerns at this time. Post op office appt date and time reviewed with pt.     Has Lovenox

## 2020-09-06 NOTE — PROGRESS NOTES
[facility] INVASIVE BARIATRIC SURG  [address] [address]  Dept: [phone]    SURGICAL WEIGHT MANAGEMENT PROGRAM   PROGRESS NOTE - SURGICAL EVALUATION     Patient: Omid Mtz        Service Date: 8/27/2020       Medical Record #: [id number]    Patient History/Assessment Summary:    The patient is a pleasant 71y.o. year old female  with morbid obesity, who stands Height: 5' 5\" (165.1 cm) tall with a weight of Weight: 227 lb (103 kg) , resulting in a BMI of Body mass index is 37.77 kg/m². This patient is alone for the evaluation today. The patient is being evaluated to undergo weight loss surgery to treat the following comorbid conditions caused by her morbid obesity: Hyperlipidemia, Obstructive Sleep Apnea, GERD, Degenerative Joint Disease (DJD) and Depression    She attended the weight loss surgery seminar, and attended bariatric education    Last Visit Weight:   Wt Readings from Last 3 Encounters:   08/31/20 225 lb 1.4 oz (102.1 kg)   08/27/20 227 lb (103 kg)   08/17/20 234 lb 3.2 oz (106.2 kg)     Today's weight is decreased from the last visit. Highest  Weight: 234    The patient is being evaluated for Laparoscopic Sleeve Gastrectomy. She  is here today to review the details of surgery     The patient acknowledges and understands the risks, benefits, and options we have discussed, as outlined in the Additional Informed Consent for this procedure. Patient also understands the importance of surgical and post-operative recommendations, including the operative diet and regular post-operative follow up care. The importance of ambulation and incentive spirometry was also discussed. All questions of this patient and any family members present have been answered to their satisfaction.     Physical Examination:     /64   Pulse 76   Temp 97.2 °F (36.2 °C)   Ht 5' 5\" (1.651 m)   Wt 227 lb (103 kg)   LMP 10/22/2007 (Within Months)   BMI 37.77 kg/m² General This patient is awake, alert, and oriented, and is in no apparent distress. Cardiac Regular rate and rhythm without evidence of murmur. Respiratory Clear to auscultation bilaterally. Abdomen Obese, soft, non-tender, non-distended without masses/ No  evidence of abdominal hernia / Incisions consistent with previous surgeries. Head and Neck Obese, normocephalic and atraumatic/soft and supple, no  lymphadenopathy or obvious bruits. Extremeties No cyanosis, clubbing or edema/ No calf tenderness/No  restrictions of movement, is ambulatory without assistance. Neurological Intact x 4 extremities, no focal deficits noted   Skin No rashes or lesions noted   Rectal Deferred     RECOMMENDATIONS:       Diagnosis Orders   1. SAMEERA (obstructive sleep apnea)     2. Gastroesophageal reflux disease without esophagitis     3. Morbid obesity due to excess calories (HCC)            We spent a great deal of time discussing the risks and benefits of Laparoscopic Sleeve Gastrectomy, including but not limited to injury to intra-abdominal organs, breakdown of the gastric staple line, the need for re-operative therapy,  prolonged hospitalization,  mechanical ventilation,  and death. We discussed the possibility of bleeding, the need for blood transfusions, blood clots, hospital-acquired and intra-abdominal infection, anastomotic stricture, and worsening GERD. And we discussed the need for post-operative visit compliance, behavior modifications and diet changes, protein and vitamin supplementation, as well as routine scheduled and dedicated exercise. We discussed the potential weight loss benefit of approximately 50-60% of her excess body weight at 12-18 months post-op, as well as the possibility of insufficient weight loss or weight gain after 2 years post-operative time.      The following was discussed with the patient:    DVT Prophylaxis, as she had prior DVT, she will be discharged on Lovenox    Risk of GERD or

## 2020-09-09 ENCOUNTER — OFFICE VISIT (OUTPATIENT)
Dept: BARIATRICS/WEIGHT MGMT | Age: 65
End: 2020-09-09

## 2020-09-09 VITALS
BODY MASS INDEX: 36.82 KG/M2 | WEIGHT: 221 LBS | TEMPERATURE: 97.2 F | DIASTOLIC BLOOD PRESSURE: 68 MMHG | SYSTOLIC BLOOD PRESSURE: 104 MMHG | HEIGHT: 65 IN | OXYGEN SATURATION: 98 % | HEART RATE: 64 BPM

## 2020-09-09 PROCEDURE — 99024 POSTOP FOLLOW-UP VISIT: CPT | Performed by: SURGERY

## 2020-09-09 NOTE — PROGRESS NOTES
Medical Nutrition Therapy  Metabolic and Bariatric surgery  1 week Post operative follow up         Pt reports:         Vitals:   Vitals:    09/09/20 1332   BP: 104/68   Pulse: 64   Temp: 97.2 °F (36.2 °C)   SpO2: 98%   Weight: 221 lb (100.2 kg)   Height: 5' 5\" (1.651 m)      Body mass index is 36.78 kg/m². Labs reviewed:              Nutrition Assessment:   PES: Inadequate food and beverage intake r/t WLS as evidenced by loss of excess body weight lost 6 lbs over 1 week.       Goals   60-80gm of protein  48-64oz of fluid       [x] met     []  Not met        Plan:  Pt questions answered re diet advancement;  F/u 5 weeks post-op      Brooklyn Cardona

## 2020-09-09 NOTE — LETTER
Visit Date: 9/9/2020    Patient: Asa Grimm  YOB: 1955    Dear Dr. Ziyad Harden, DO,           As you know we are treating your patient for obesity (as indicated by elevated  BMI of 30 kg/(m^2) or greater)  with a sleeve gastrectomy. Izabel Walker had surgery on August 31, 2020. She was discharged home uneventfully. Her current Weight: 221 lb (100.2 kg),  her  Body mass index is 36.78 kg/m². Over the next year the patient will be scheduled with our team at  1 month, 3 months, 6 months, 9 months, 1 year, and annually as well. At each visit we will review nutrition related labs, weight, vitamin/mineral intake, and overall health. Izabel Walker will have the opportunity to partner with the dietitian to continue to review health related goals. The team here would very much appreciate your assistance in encouraging your patient to attend these very important appointments. As our patients begin to heal from surgery, lose weight, and experience improved quality of life there will inevitably be struggles due to the chronic and relapsing nature of obesity. We offer closer follow up as needed, support groups and a  to support them. Thank you for allowing me to participate in the care of your patient. If you have any questions or concerns, please do not hesitate to call.       Sincerely,   Dr. Jasmyn Kc, DO [facility]  [address]  O: [phone]  F: [phone]

## 2020-09-09 NOTE — PROGRESS NOTES
[facility]  [facility]ASIVE [facility]RIC SURG  [address] [place] [address] [place]  [address] [place], [place] [id number]  Dept: [phone]    SURGICAL WEIGHT LOSS MANAGEMENT PROGRAM  PROGRESS NOTE    CC: Weight Loss    Patient: Dez Han      Service Date: 9/9/2020  Visit:   1 week    Medical Record #: [id number]  Date of Surgery:   8/31/2020    Reason for Visit: Routine Post-Operative:  [] New Problem /   [] FU of existing problem    Patient seen and examined for 1 week visit after gastric sleeve. Doing well. No nausea, vomiting, fevers/chills. No chest pain or shortness of breath. Had stools    Height: 5' 5\" (1.651 m)  Highest Weight:   250lbs    Current Visit Weight Information  Weight: 221 lb (100.2 kg)   BMI: Body mass index is 36.78 kg/m². Weight loss since surgery:     39    1 wk - D/C'd home on VTE Prohylaxis? [x] Yes   [] No   On VTE Proph as directed? [x] Yes   [] No     [Labs to be drawn prior to 1m, 3m, 6m, 12m, 18m, and annual visits]    Liver pathology:    [x] NA    [] No Gross path    [] Liver Steatosis   [] Discussed w/ pt   [] Referred to GI    Exercising?    [] Yes    [x] No       Review of Systems:     General  Negative for: [] Weight Change   [x] Fatigue   [x] Fevers & Chills    [] Appetite change [] Other:    Positive for: [x] Weight Change   [] Fatigue   [] Fevers & Chills    [] Appetite change [] Other:   Cardiac  Negative for: [x] Chest Pain   [] Difficulty Breathing   [] Leg Cramps [x] Edema of Lower Extremeties    [] Left   [] Right      Positive for: [] Chest Pain   [] Difficulty Breathing   [] Leg Cramps [] Edema of Lower Extremeties    [] Left   [] Right   Pulmonary  Negative for: [x] Shortness of Breath [] Wheeze [x] Cough  [] Calf Pain     Positive for: [] Shortness of Breath [] Wheeze [] Cough  [] Calf Pain   Gastro-Intestinal Negative for: [] Heartburn   [] Reflux   [] Dysphagia   [] Melena   [] BRBPR  [x] Vomiting   [x] Abdominal Pain   [x] Diarrhea     [x] Constipation  [] Other: Positive for: [] Heartburn   [] Reflux   [] Dysphagia   [] Melena   [] BRBPR  [] Vomiting   [] Abdominal Pain   [] Diarrhea     [] Constipation  [] Other:    Muskuloskeletal Negative for: [] Joint pain   [] Back pain   [] Knee Pain   [x] Muscle weakness [x] Hernia   [] Edema [] Other:     Positive for: [] Joint pain   [] Back pain   [] Knee Pain   [] Muscle weakness [] Hernia   [] Edema [] Other:    Neurologic Negative for: [x] Syncope   [x] Insomnia   [] Being treated for depression  [] Other:     Positive for: [] Syncope   [] Insomnia   [] Being treated for depression  [] Other:    Skin Negative for: [x] Wound:   [] Open   [] Draining   [] Incisional     [x] Rash   [] Hair Loss  [] Other:     Positive for: [] Wound:   [] Open   [] Draining    [] Incisional  [] Rash   [] Hair Loss  [] Other:          Physical Assessment:     /68   Pulse 64   Temp 97.2 °F (36.2 °C)   Ht 5' 5\" (1.651 m)   Wt 221 lb (100.2 kg)   LMP 10/22/2007 (Within Months)   SpO2 98%   BMI 36.78 kg/m²   General Negative for:  [x] Lapses in memory   [x] Unusual Stress   [] Diffic Concen [] Unable to sleep   [] Eating in response to stress   [] Other:    Positive for:  [] Lapses in memory   [] Unusual Stress   [] Diffic Concen [] Unable to sleep   [] Eating in response to stress   [] Other:   Cardio-Pulmonary   [x] Heart RRR   [x] No murmurs   [] Pulses nl x4 extrem    [x] Good inspiratory effort   [x] No wheezing     [x] Lungs clear to auscultation bilaterally    [] Other:    Gastro-Intestinal   [x] Abd S/NT/ND/Benign   [x] No abdominal mass/hernia    [x] No Splenomegaly    [] Other:    Muskuloskeletal   [x] Good muscle strength x4 extremities   [x] nl gait and ambul    [x] Nl ROM x4 extremities    [] Other:    Neurologic   [x] Alert and oriented x3    [] Other:   Skin   [x] Intact w/ no open wounds   [x] Incisions C/D/I    [] Steri strips removed   [x] No drainage or Infection    [] Other:      Assessment & Plan:      1.  S/P laparoscopic sleeve gastrectomy              [x] Advance Diet    [x] Daily protein (65-75gm/day)   [x] Take Vitamins   [x] Attend Support Group    [x] Exercise Regularly     PPI    TAZ removed    Ambulation    Lovenox    Advance to full liquids  Doing well overall  Follow up: No follow-ups on file. Orders placed this encounter:   No orders of the defined types were placed in this encounter. New Prescriptions:   No orders of the defined types were placed in this encounter. Electronically signed by Luis Sweeney DO on 9/20/2020 at 10:51 AM    Please note that this chart was generated using voice recognition Dragon dictation software. Although every effort was made to ensure the accuracy of this automated transcription, some errors in transcription may have occurred.

## 2020-09-15 ENCOUNTER — TELEPHONE (OUTPATIENT)
Dept: BARIATRICS/WEIGHT MGMT | Age: 65
End: 2020-09-15

## 2020-09-15 NOTE — TELEPHONE ENCOUNTER
Next Visit Date:  10/8/2020    Patient Surgery Date  Sleeve 8/31/20    Type of  Surgery  sleeve    Patient calls complaining of  Feels dizzy everytime stands up; Extreme fatigue;  getting about 50oz of flud daily; bm x1 yesterday; urinating 4x daily; started new anti anxiety med from PCP    Onset: 3 day(s) ago  Timing: constant, intermittent  Severity:     Progression: increasing    Associated Symptoms:     Any allergy  To medications         Current  Pharmacy       Patient advised:   If  Symptoms worsen seek treatment at  Emergency Room. You will receive a call back from the office within 24-48 hours. Is it ok to leave a message if we call back within 24 hrs as pt reports left a VMM for us yesterday.

## 2020-09-15 NOTE — TELEPHONE ENCOUNTER
I spoke with patient, let her know we would be sending her for IV fluids. I had to leave a message with Chikis on 3C to call me back to set up fluids.

## 2020-09-16 PROBLEM — E86.0 DEHYDRATION: Status: ACTIVE | Noted: 2020-09-16

## 2020-10-08 ENCOUNTER — OFFICE VISIT (OUTPATIENT)
Dept: BARIATRICS/WEIGHT MGMT | Age: 65
End: 2020-10-08

## 2020-10-08 VITALS
BODY MASS INDEX: 35.65 KG/M2 | HEIGHT: 65 IN | WEIGHT: 214 LBS | SYSTOLIC BLOOD PRESSURE: 110 MMHG | TEMPERATURE: 97.8 F | HEART RATE: 68 BPM | DIASTOLIC BLOOD PRESSURE: 64 MMHG

## 2020-10-08 PROCEDURE — 99024 POSTOP FOLLOW-UP VISIT: CPT | Performed by: SURGERY

## 2020-10-08 NOTE — PROGRESS NOTES
Medical Nutrition Therapy  Metabolic and Bariatric surgery  1 month after surgery follow up note         Pt reports:         Vitals:   Vitals:    10/08/20 1508   BP: 110/64   Pulse: 68   Temp: 97.8 °F (36.6 °C)   Weight: 214 lb (97.1 kg)   Height: 5' 5\" (1.651 m)      Body mass index is 35.61 kg/m². Labs reviewed:     Multivitamin/mineral intake:2  Calcium intake:   1  Other:            Nutrition Assessment:   PES: Inadequate food and beverage intake r/t WLS as evidenced by loss of excess body weight 13lb.       Goals   60-80gm of protein  48-64oz of fluid     [] met     [x]  Not met        Plan: continue to advance diet Questions answered re diet advancement and tolerance  F/u 3 months after surgery         Joanna Holt

## 2020-10-16 PROBLEM — E86.0 DEHYDRATION: Status: RESOLVED | Noted: 2020-09-16 | Resolved: 2020-10-16

## 2020-10-18 NOTE — PROGRESS NOTES
[facility]  [facility]  [address] [place] [address] [place]  [address] [facility] [address] [address]  Dept: [phone]    SURGICAL WEIGHT LOSS MANAGEMENT PROGRAM  PROGRESS NOTE    CC: Weight Loss    Patient: Teagan Jackson      Service Date: 10/8/2020  Visit:   1 month   Medical Record #: [id number]  Date of Surgery:   8/31/2020    Reason for Visit: Routine Post-Operative:  [] New Problem /   [] FU of existing problem    Patient seen and examined for 1 month visit after gastric sleeve. Doing well. No nausea, vomiting, fevers/chills. No chest pain or shortness of breath. Had stools    Height: 5' 5\" (1.651 m)  Highest Weight:   250lbs    Current Visit Weight Information  Weight: 214 lb (97.1 kg)   BMI: Body mass index is 35.61 kg/m². Weight loss since surgery:     36    1 wk - D/C'd home on VTE Prohylaxis? [x] Yes   [] No   On VTE Proph as directed? [x] Yes   [] No     [Labs to be drawn prior to 1m, 3m, 6m, 12m, 18m, and annual visits]    Liver pathology:    [x] NA    [] No Gross path    [] Liver Steatosis   [] Discussed w/ pt   [] Referred to GI    Exercising?    [] Yes    [x] No       Review of Systems:     General  Negative for: [] Weight Change   [x] Fatigue   [x] Fevers & Chills    [] Appetite change [] Other:    Positive for: [x] Weight Change   [] Fatigue   [] Fevers & Chills    [] Appetite change [] Other:   Cardiac  Negative for: [x] Chest Pain   [] Difficulty Breathing   [] Leg Cramps [x] Edema of Lower Extremeties    [] Left   [] Right      Positive for: [] Chest Pain   [] Difficulty Breathing   [] Leg Cramps [] Edema of Lower Extremeties    [] Left   [] Right   Pulmonary  Negative for: [x] Shortness of Breath [] Wheeze [x] Cough  [] Calf Pain     Positive for: [] Shortness of Breath [] Wheeze [] Cough  [] Calf Pain   Gastro-Intestinal Negative for: [] Heartburn   [] Reflux   [] Dysphagia   [] Melena   [] BRBPR  [x] Vomiting   [x] Abdominal Pain   [x] Diarrhea     [x] Constipation  [] Other: Positive for: [] Heartburn   [] Reflux   [] Dysphagia   [] Melena   [] BRBPR  [] Vomiting   [] Abdominal Pain   [] Diarrhea     [] Constipation  [] Other:    Muskuloskeletal Negative for: [] Joint pain   [] Back pain   [] Knee Pain   [x] Muscle weakness [x] Hernia   [] Edema [] Other:     Positive for: [] Joint pain   [] Back pain   [] Knee Pain   [] Muscle weakness [] Hernia   [] Edema [] Other:    Neurologic Negative for: [x] Syncope   [x] Insomnia   [] Being treated for depression  [] Other:     Positive for: [] Syncope   [] Insomnia   [] Being treated for depression  [] Other:    Skin Negative for: [x] Wound:   [] Open   [] Draining   [] Incisional     [x] Rash   [] Hair Loss  [] Other:     Positive for: [] Wound:   [] Open   [] Draining    [] Incisional  [] Rash   [] Hair Loss  [] Other:          Physical Assessment:     /64   Pulse 68   Temp 97.8 °F (36.6 °C)   Ht 5' 5\" (1.651 m)   Wt 214 lb (97.1 kg)   LMP 10/22/2007 (Within Months)   BMI 35.61 kg/m²   General Negative for:  [x] Lapses in memory   [x] Unusual Stress   [] Diffic Concen [] Unable to sleep   [] Eating in response to stress   [] Other:    Positive for:  [] Lapses in memory   [] Unusual Stress   [] Diffic Concen [] Unable to sleep   [] Eating in response to stress   [] Other:   Cardio-Pulmonary   [x] Heart RRR   [x] No murmurs   [] Pulses nl x4 extrem    [x] Good inspiratory effort   [x] No wheezing     [x] Lungs clear to auscultation bilaterally    [] Other:    Gastro-Intestinal   [x] Abd S/NT/ND/Benign   [x] No abdominal mass/hernia    [x] No Splenomegaly    [] Other:    Muskuloskeletal   [x] Good muscle strength x4 extremities   [x] nl gait and ambul    [x] Nl ROM x4 extremities    [] Other:    Neurologic   [x] Alert and oriented x3    [] Other:   Skin   [x] Intact w/ no open wounds   [x] Incisions C/D/I    [] Steri strips removed   [x] No drainage or Infection    [] Other:      Assessment & Plan:      1. SAMEERA (obstructive sleep apnea)    2. S/P laparoscopic sleeve gastrectomy              [x] Advance Diet    [x] Daily protein (65-75gm/day)   [x] Take Vitamins   [x] Attend Support Group    [x] Exercise Regularly     PPI    Check labs    Vitamins      Exercise     Doing well overall  Follow up: No follow-ups on file. Orders placed this encounter:   Orders Placed This Encounter   Procedures    CBC Auto Differential    Comprehensive Metabolic Panel    Ferritin    Hemoglobin A1C    Iron and TIBC    Vitamin A    TSH without Reflex    T4, Free    PTH, Intact    Magnesium    Vitamin B1, Whole Blood    Vitamin B12    Vitamin D 25 Hydroxy    Zinc       New Prescriptions:   No orders of the defined types were placed in this encounter. Electronically signed by Desiree Tan DO on 10/18/2020 at 2:23 PM    Please note that this chart was generated using voice recognition Dragon dictation software. Although every effort was made to ensure the accuracy of this automated transcription, some errors in transcription may have occurred.

## 2020-11-12 ENCOUNTER — HOSPITAL ENCOUNTER (OUTPATIENT)
Dept: PHYSICAL THERAPY | Facility: CLINIC | Age: 65
Setting detail: THERAPIES SERIES
Discharge: HOME | End: 2020-11-12
Payer: MEDICARE

## 2020-11-12 PROCEDURE — 97140 MANUAL THERAPY 1/> REGIONS: CPT

## 2020-11-12 PROCEDURE — 97161 PT EVAL LOW COMPLEX 20 MIN: CPT

## 2020-11-12 PROCEDURE — 97110 THERAPEUTIC EXERCISES: CPT

## 2020-11-12 NOTE — PRE-CERTIFICATION NOTE
Medicare Cap   [] Physical Therapy  [] Speech Therapy  [] Occupational therapy  *PT and Speech caps combine      $2040 Cap limit < kx modifier needed        Patient Name: Augustine Estrada: 1955    Note:  This is an estimate of charges billed.      Date of [address] Name # units/ charge $$$ charge Daily Total Charge Ongoing Total $$$   11/12/20 PT eval  [facility]  Manual 1+1+1 82.82+23.22+21.70  127.74

## 2020-11-12 NOTE — CONSULTS
[] [facility]) [facility] &  Therapy  [address]  [phone]  F: [phone] [] [address]  [place] [address]   [address]  P: [phone]  F: [phone] [] [place]  [address]  P: [phone]  F: [phone] [] [address]  [facility]   [address]   [place]: [phone]  F: [phone]  [x] [address]  P: [phone]  F: [phone]      Physical Therapy Spine Evaluation    Date:  2020  Patient: Dinorah Henson  :  1955  MRN: [id number]  Physician: Dr. Andriy Parr,    Insurance: Medicare   Medical Diagnosis: R thigh pain, L PSIS pain  Rehab Codes: M79.65, M51.86, M46.1  Onset date: ~10/11/20  Next 's appt.: N/A    Subjective:   CC: Pt arrives with low back pain with gradual onset. Began seeing chiropractor around  d/t back pain worsening. Pt states that pain is decreased after seeing chiropractor, however does not completely go away. Pt had LTHA in  of this year, doctor did not recommend pt to PT. Pt also had bariatric surgery in 2020 and has lost 50 pounds. Pt describes pain has lower lumbar region, bilateral buttocks (L>R). Also has R \"tightness\" in posterior thigh. Does have numbness/tingling L lateral thigh.      HPI:     PMHx: [] Unremarkable [] Diabetes [] HTN  [] Pacemaker   [] MI/Heart Problems [] Cancer [] Arthritis [] Other:              [x] Refer to full medical chart  In EPIC         Tests: [] X-Ray: [] MRI:  [] Other:    Medications: [x] Refer to full medical record [] None [] Other:  Allergies:      [x] Refer to full medical record [] None [] Other:        Marital Status    [place] from [email or web address] Assessment:  Patient would benefit from skilled physical therapy services in order to: Decrease low back and piriformis tightness, increase LLE hip abd strength in order to decrease low back pain. Goals:        STG: (to be met in 10 treatments)  1. ? Pain: Decrease pain levels to 4/10 at worst  2. ? ROM: Increase flexibility and AROM limitations throughout to equal bilat to reduce difficulty with ADLs  3. ? Strength: Increase L hip abd strength to 3+/5  4. ? Function: Pt to report ambulating at home with decreased low back pain  5. Independent with Home Exercise Programs      LTG: (to be met in 20 treatments)  1. Improve score on assessment tool from 54% impaired to 25% impaired, demonstrating an improvement in ADLs  2. Reduce pain levels to 0/10                   Patient goals: To \"walk normal\" and decrease back pain    Rehab Potential:  [x] Good  [] Fair  [] Poor   Suggested Professional Referral:  [] No  [] Yes:  Barriers to Goal Achievement[de-identified]  [] No  [] Yes:  Domestic Concerns:  [] No  [] Yes:    Pt. Education:  [x] Plans/Goals, Risks/Benefits discussed  [] Home exercise program    Method of Education: [x] Verbal  [x] Demo  [x] Written  Comprehension of Education:  [x] Verbalizes understanding. [x] Demonstrates understanding. [x] Needs Review. [] Demonstrates/verbalizes understanding of HEP/Ed previously given. Treatment Plan:  [x] Therapeutic Exercise    [] Aquatic Therapy   [x] Manual Therapy     [] Electrical Stimulation  [] Instruction in HEP      [] Lumbar/Cervical Traction  [] Neuromuscular Re-education [] Cold/hotpack  [] Iontophoresis: 4 mg/mL  [] Vasocompression (GameReady)                    Dexamethasone Sodium  [] Gait Training             Phosphate 40-80 mAmin  [x] Integrative Dry Needling         []  Medication allergies reviewed for use of    Dexamethasone Sodium Phosphate 4mg/ml     with iontophoresis treatments. Pt is not allergic.     Frequency:  2 x/week for 20 visits    Todays Treatment:    Exercises:  Exercise  Low back pain Reps/ Time Weight/ Level Comments   NUSTEP                                    *Clamshells 2x10     *Bridges 2x10     *LTR 2x10     *Supine piriformis stretch 3x30\"                                   Other: *Distributed as HEP    Manual: DI to bilateral piriformis  Integrative Dry Needling: Bilateral piriformis, Homeostatic point: R inferior cluneal    Specific Instructions for next treatment: Continue with manual, progress LLE strengthening as tolerated    Evaluation Complexity:  History (Personal factors, comorbidities) [x] 0 [] 1-2 [] 3+   Exam (limitations, restrictions) [x] 1-2 [] 3 [] 4+   Clinical presentation (progression) [x] Stable [] Evolving  [] Unstable   Decision Making [x] Low [] Moderate [] High    [x] Low Complexity [] Moderate Complexity [] High Complexity       Treatment Charges: Mins Units   [x] Evaluation       [x]  Low       []  Moderate       []  High 15 1   []  Modalities     [x]  Ther Exercise 15 1   [x]  Manual Therapy 15 1   []  Ther Activities     []  Aquatics     []  Vasocompression     [x]  Other: Dry Needling 5 0     TOTAL TREATMENT TIME: 50 minutes    Time in: 0910   Time Out: 1000    Electronically signed by: Chris Cordon PT        Physician Signature:________________________________Date:__________________  By signing above or cosigning this note, I have reviewed this plan of care and certify a need for medically necessary rehabilitation services.      *PLEASE SIGN ABOVE AND FAX BACK ALL PAGES*

## 2020-11-16 ENCOUNTER — HOSPITAL ENCOUNTER (OUTPATIENT)
Dept: PHYSICAL THERAPY | Facility: CLINIC | Age: 65
Setting detail: THERAPIES SERIES
Discharge: HOME | End: 2020-11-16
Payer: MEDICARE

## 2020-11-16 PROCEDURE — 97110 THERAPEUTIC EXERCISES: CPT

## 2020-11-16 PROCEDURE — 97140 MANUAL THERAPY 1/> REGIONS: CPT

## 2020-11-16 NOTE — FLOWSHEET NOTE
[] [facility]) - [facility] &  Therapy  [address]  [phone]  F: [phone] [] [address]  [email or web address] [address]   [address]  P: [phone]  F: [phone] [] [address] &  Therapy  [address] [facility]  P: [phone]  F: [phone] [x] [address]  P: [phone]  F: [phone] [] [address]  [facility]   [address]   [place]: [phone]  F: [phone]      Physical Therapy Daily Treatment Note    Date:  2020  Patient Name:  Terence Gupta    :  1955  MRN: [id number]  Physician: Dr. Vianney Alcala, DO                            Insurance: Medicare   Medical Diagnosis: R thigh pain, L PSIS pain                     Rehab Codes: M79.65, M51.86, M46.1  Onset date: ~10/11/20                       Next 's appt.: N/A    Visit# / total visits: ; Progress note for Medicare patient due at visit 10     Cancels/No Shows: 0/0    Subjective:    Pain:  [x] Yes  [] No Location: LBP  Pain Rating: (0-10 scale) 5/10  Pain altered Tx:  [x] No  [] Yes  Action:  Comments: Pt reports feeling good after last visit however states she was pretty sedentary yesterday which caused increased pain and stiffness in low back. Objective:  Exercises:  Exercise  Low back pain Reps/ Time Weight/ Level Comments    NUSTEP  8' L4   x   SB Gastroc Str 2x30\"     x   Stool HS Str  2x30\"   x              *Clamshells 2x10     x   *Bridges 2x10    cues for TA first  x   *LTR 2x10     x   *Supine piriformis stretch 3x30\"     x                                Other: *Distributed as HEP     Manual: DI to bilateral piriformis  Integrative Dry Needling: Bilateral piriformis, bilateral L3/L4, L4/L5 paravertebrals.  Homeostatic point: L inferior cluneal. Performed today toward long and short term goals.     [] Specific Instructions for subsequent treatments:     Frequency:  2 x/week for 20 visits      Time In: 11:05am            Time Out: 12:00pm    Electronically signed by:  Yvon Stevens PTA

## 2020-11-16 NOTE — PRE-CERTIFICATION NOTE
Medicare Cap   [x] Physical Therapy  [] Speech Therapy  [] Occupational therapy  *PT and Speech caps combine      $2040 Cap limit < kx modifier needed        Patient Name: Anand Castellanos: 1955    Note:  This is an estimate of charges billed.      Date of [address] Name # units/ charge $$$ charge Daily Total Charge Ongoing Total $$$   11/12/20 PT eval  Therex  Manual 1+1+1 82.82+23.22+21.70  127.74   11/16/20 2TE, 1Man (PTA)  2+1 25.26+19.74+18.45 63.45 191.19

## 2020-11-19 ENCOUNTER — HOSPITAL ENCOUNTER (OUTPATIENT)
Dept: PHYSICAL THERAPY | Facility: CLINIC | Age: 65
Setting detail: THERAPIES SERIES
Discharge: HOME | End: 2020-11-19
Payer: MEDICARE

## 2020-11-19 PROCEDURE — 97140 MANUAL THERAPY 1/> REGIONS: CPT

## 2020-11-19 PROCEDURE — 97110 THERAPEUTIC EXERCISES: CPT

## 2020-11-19 NOTE — FLOWSHEET NOTE
paravertebrals. Homeostatic point: Bilat inferior cluneal.    Treatment Charges: Mins Units   []  Modalities     [x]  Ther Exercise 10 2   [x]  Manual Therapy 25 2   []  Ther Activities     []  Aquatics     []  Vasocompression     [x]  Other: Dry Needling 5 0   Total Treatment time 40 3       Assessment: [x] Progressing toward goals. Pt arrives with R posterior knee pain states has been gradually worsening for a \"long time\". Focused treatment on manual to decrease pain and tightness. Pt does state to having decreased posterior knee pain post manual.       [] No change. [] Other:  [x] Patient would continue to benefit from skilled physical therapy services in order to decrease low back and piriformis tightness, increase LLE hip abd strength in order to decrease low back pain. STG/LTG    STG: (to be met in 10 treatments)  1. ? Pain: Decrease pain levels to 4/10 at worst  2. ? ROM: Increase flexibility and AROM limitations throughout to equal bilat to reduce difficulty with ADLs  3. ? Strength: Increase L hip abd strength to 3+/5  4. ? Function: Pt to report ambulating at home with decreased low back pain  5. Independent with Home Exercise Programs        LTG: (to be met in 20 treatments)  1. Improve score on assessment tool from 54% impaired to 25% impaired, demonstrating an improvement in ADLs  2. Reduce pain levels to 0/10                    Patient goals: To \"walk normal\" and decrease back pain    Pt. Education:  [x] Yes  [] No  [] Reviewed Prior HEP/Ed  Method of Education: [] Verbal  [] Demo  [] Written  Comprehension of Education:  [] Verbalizes understanding. [] Demonstrates understanding. [] Needs review. [x] Demonstrates/verbalizes HEP/Ed previously given. Plan: [x] Continue current frequency toward long and short term goals.     [] Specific Instructions for subsequent treatments:     Frequency:  2 x/week for 20 visits      Time In: 1110            Time Out: 1150    Electronically signed by: Jessika Sat, PT

## 2020-11-19 NOTE — PRE-CERTIFICATION NOTE
Medicare Cap   [x] Physical Therapy  [] Speech Therapy  [] Occupational therapy  *PT and Speech caps combine      $2040 Cap limit < kx modifier needed        Patient Name: Mtira Chacko: 1955    Note:  This is an estimate of charges billed.      Date of [address] Name # units/ charge $$$ charge Daily Total Charge Ongoing Total $$$   11/12/20 PT eval  Therex  Manual 1+1+1 82.82+23.22+21.70  127.74   11/16/20 2TE, 1Man (PTA)  2+1 25.26+19.74+18.45 63.45 191.19   11/19 2 manual  1 therex 2+1 27.39+21.70+23.22 72.31 263.5

## 2020-11-24 ENCOUNTER — HOSPITAL ENCOUNTER (OUTPATIENT)
Dept: PHYSICAL THERAPY | Facility: CLINIC | Age: 65
Setting detail: THERAPIES SERIES
Discharge: HOME | End: 2020-11-24
Payer: MEDICARE

## 2020-11-24 PROCEDURE — 97110 THERAPEUTIC EXERCISES: CPT

## 2020-11-24 PROCEDURE — 97140 MANUAL THERAPY 1/> REGIONS: CPT

## 2020-11-24 NOTE — PRE-CERTIFICATION NOTE
Medicare Cap   [x] Physical Therapy  [] Speech Therapy  [] Occupational therapy  *PT and Speech caps combine      $2040 Cap limit < kx modifier needed        Patient Name: Karin Mo: 1955    Note:  This is an estimate of charges billed.      Date of [address] Name # units/ charge $$$ charge Daily Total Charge Ongoing Total $$$   11/12/20 PT eval  Therex  Manual 1+1+1 82.82+23.22+21.70  127.74   11/16/20 2TE, 1Man (PTA)  2+1 25.26+19.74+18.45 63.45 191.19   11/19 2 manual  1 therex 2+1 27.39+21.70+23.22 72.31 263.5   11/24 2 manual  1 therex 2+1 27.39+21.70+23.22 72.31 335.81

## 2020-11-24 NOTE — FLOWSHEET NOTE
inferior cluneal.    Treatment Charges: Mins Units   []  Modalities     [x]  Ther Exercise 10 2   [x]  Manual Therapy 25 2   []  Ther Activities     []  Aquatics     []  Vasocompression     [x]  Other: Dry Needling 5 0   Total Treatment time 40 3       Assessment: [x] Progressing toward goals. Pt tolerating all manual and exercises well, states to having decreased back pain post manual this date. [] No change. [] Other:  [x] Patient would continue to benefit from skilled physical therapy services in order to decrease low back and piriformis tightness, increase LLE hip abd strength in order to decrease low back pain. STG/LTG    STG: (to be met in 10 treatments)  1. ? Pain: Decrease pain levels to 4/10 at worst  2. ? ROM: Increase flexibility and AROM limitations throughout to equal bilat to reduce difficulty with ADLs  3. ? Strength: Increase L hip abd strength to 3+/5  4. ? Function: Pt to report ambulating at home with decreased low back pain  5. Independent with Home Exercise Programs        LTG: (to be met in 20 treatments)  1. Improve score on assessment tool from 54% impaired to 25% impaired, demonstrating an improvement in ADLs  2. Reduce pain levels to 0/10                    Patient goals: To \"walk normal\" and decrease back pain    Pt. Education:  [x] Yes  [] No  [] Reviewed Prior HEP/Ed  Method of Education: [] Verbal  [] Demo  [] Written  Comprehension of Education:  [] Verbalizes understanding. [] Demonstrates understanding. [] Needs review. [x] Demonstrates/verbalizes HEP/Ed previously given. Plan: [x] Continue current frequency toward long and short term goals.     [] Specific Instructions for subsequent treatments:     Frequency:  2 x/week for 20 visits      Time In: 1100            Time Out: 1140    Electronically signed by:  Griselda Posey, PT

## 2020-12-01 ENCOUNTER — HOSPITAL ENCOUNTER (OUTPATIENT)
Age: 65
Setting detail: SPECIMEN
Discharge: HOME | End: 2020-12-01
Payer: MEDICARE

## 2020-12-01 ENCOUNTER — HOSPITAL ENCOUNTER (OUTPATIENT)
Dept: PHYSICAL THERAPY | Facility: CLINIC | Age: 65
Setting detail: THERAPIES SERIES
Discharge: HOME | End: 2020-12-01
Payer: MEDICARE

## 2020-12-01 LAB
ABSOLUTE EOS #: 0.17 K/UL (ref 0–0.44)
ABSOLUTE IMMATURE GRANULOCYTE: <0.03 K/UL (ref 0–0.3)
ABSOLUTE LYMPH #: 1.42 K/UL (ref 1.1–3.7)
ABSOLUTE MONO #: 0.34 K/UL (ref 0.1–1.2)
ALBUMIN SERPL-MCNC: 4 G/DL (ref 3.5–5.2)
ALBUMIN/GLOBULIN RATIO: 1.5 (ref 1–2.5)
ALP BLD-CCNC: 105 U/L (ref 35–104)
ALT SERPL-CCNC: 20 U/L (ref 5–33)
ANION GAP SERPL CALCULATED.3IONS-SCNC: 17 MMOL/L (ref 9–17)
AST SERPL-CCNC: 24 U/L
BASOPHILS # BLD: 1 % (ref 0–2)
BASOPHILS ABSOLUTE: 0.04 K/UL (ref 0–0.2)
BILIRUB SERPL-MCNC: 0.76 MG/DL (ref 0.3–1.2)
BUN BLDV-MCNC: 15 MG/DL (ref 8–23)
BUN/CREAT BLD: ABNORMAL (ref 9–20)
CALCIUM SERPL-MCNC: 9.9 MG/DL (ref 8.6–10.4)
CHLORIDE BLD-SCNC: 106 MMOL/L (ref 98–107)
CO2: 19 MMOL/L (ref 20–31)
CREAT SERPL-MCNC: 0.68 MG/DL (ref 0.5–0.9)
DIFFERENTIAL TYPE: NORMAL
EOSINOPHILS RELATIVE PERCENT: 4 % (ref 1–4)
ESTIMATED AVERAGE GLUCOSE: 111 MG/DL
FERRITIN: 78 UG/L (ref 13–150)
GFR AFRICAN AMERICAN: >60 ML/MIN
GFR NON-AFRICAN AMERICAN: >60 ML/MIN
GFR SERPL CREATININE-BSD FRML MDRD: ABNORMAL ML/MIN/{1.73_M2}
GFR SERPL CREATININE-BSD FRML MDRD: ABNORMAL ML/MIN/{1.73_M2}
GLUCOSE BLD-MCNC: 107 MG/DL (ref 70–99)
HBA1C MFR BLD: 5.5 % (ref 4–6)
HCT VFR BLD CALC: 42.9 % (ref 36.3–47.1)
HEMOGLOBIN: 14.1 G/DL (ref 11.9–15.1)
IMMATURE GRANULOCYTES: 0 %
IRON SATURATION: 37 % (ref 20–55)
IRON: 117 UG/DL (ref 37–145)
LYMPHOCYTES # BLD: 30 % (ref 24–43)
MAGNESIUM: 2.4 MG/DL (ref 1.6–2.6)
MCH RBC QN AUTO: 30.7 PG (ref 25.2–33.5)
MCHC RBC AUTO-ENTMCNC: 32.9 G/DL (ref 28.4–34.8)
MCV RBC AUTO: 93.5 FL (ref 82.6–102.9)
MONOCYTES # BLD: 7 % (ref 3–12)
NRBC AUTOMATED: 0 PER 100 WBC
PDW BLD-RTO: 14.1 % (ref 11.8–14.4)
PLATELET # BLD: 165 K/UL (ref 138–453)
PLATELET ESTIMATE: NORMAL
PMV BLD AUTO: 10.6 FL (ref 8.1–13.5)
POTASSIUM SERPL-SCNC: 3.8 MMOL/L (ref 3.7–5.3)
PTH INTACT: 55.63 PG/ML (ref 15–65)
RBC # BLD: 4.59 M/UL (ref 3.95–5.11)
RBC # BLD: NORMAL 10*6/UL
SEG NEUTROPHILS: 58 % (ref 36–65)
SEGMENTED NEUTROPHILS ABSOLUTE COUNT: 2.84 K/UL (ref 1.5–8.1)
SODIUM BLD-SCNC: 142 MMOL/L (ref 135–144)
THYROXINE, FREE: 1.17 NG/DL (ref 0.93–1.7)
TOTAL IRON BINDING CAPACITY: 316 UG/DL (ref 250–450)
TOTAL PROTEIN: 6.7 G/DL (ref 6.4–8.3)
TSH SERPL DL<=0.05 MIU/L-ACNC: 2.67 MIU/L (ref 0.3–5)
UNSATURATED IRON BINDING CAPACITY: 199 UG/DL (ref 112–347)
VITAMIN B-12: 1276 PG/ML (ref 232–1245)
VITAMIN D 25-HYDROXY: 49.4 NG/ML (ref 30–100)
WBC # BLD: 4.8 K/UL (ref 3.5–11.3)
WBC # BLD: NORMAL 10*3/UL

## 2020-12-01 PROCEDURE — 97110 THERAPEUTIC EXERCISES: CPT

## 2020-12-01 PROCEDURE — 97140 MANUAL THERAPY 1/> REGIONS: CPT

## 2020-12-01 NOTE — FLOWSHEET NOTE
[] [facility]) - [facility] &  Therapy  [address]  [phone]  F: [phone] [] [address]  [facility]   [address]  P: [phone]  F: [phone] [] [address] &  Therapy  [address]  P: [phone]  F: [phone] [x] [address]  P: [phone]  F: [phone] [] [address]  [facility]   [address]   [place]: [phone]  F: [phone]      Physical Therapy Daily Treatment Note    Date:  2020  Patient Name:  Brandt Crowley    :  1955  MRN: [id number]  Physician: Dr. Ramon Alexander, DO                            Insurance: Medicare   Medical Diagnosis: R thigh pain, L PSIS pain                     Rehab Codes: M79.65, M51.86, M46.1  Onset date: ~10/11/20                       Next 's appt.: N/A    Visit# / total visits: ; Progress note for Medicare patient due at visit 10     Cancels/No Shows: 0/0    Subjective:    Pain:  [x] Yes  [] No Location: L post thigh  Pain Rating: (0-10 scale) 6/10  Pain altered Tx:  [x] No  [] Yes  Action:  Comments: Pt arrives with an increase in L sided low back pain, attributes to walking less d/t bad weather outside. Does state to having a decrease in posterior knee pain. Objective:  Exercises:  Exercise  Low back pain Reps/ Time Weight/ Level Comments   NUSTEP  5' L4     SB Gastroc Str 2x30\"       Stool HS Str  2x30\"               *Clamshells 2x10       *Bridges 2x10    cues for TA first    *LTR 2x10       *Supine piriformis stretch 3x30\"                                 Other: *Distributed as HEP     Manual: Hypervolt to bilateral piriformis, R HS     Integrative Dry Needling: Bilat piriformis, bilateral L3/L4, L4/L5 paravertebrals.  Homeostatic point: Bilat sided inferior cluneal.    Treatment Charges: Mins Units   []  Modalities     [x]  Ther Exercise 10 2   [x]  Manual Therapy 25 2   []  Ther Activities     []  Aquatics     []  Vasocompression     [x]  Other: Dry Needling 5 0   Total Treatment time 40 3       Assessment: [x] Progressing toward goals. Pt with decreased tightness noted post manual this date. Pt voicing feeling a \"good stretch\" with LLE hamstring stretch. Encouraged pt to perform 2x/day at home to help decrease tightness; pt voices understanding         [] No change. [] Other:  [x] Patient would continue to benefit from skilled physical therapy services in order to decrease low back and piriformis tightness, increase LLE hip abd strength in order to decrease low back pain. STG/LTG    STG: (to be met in 10 treatments)  1. ? Pain: Decrease pain levels to 4/10 at worst  2. ? ROM: Increase flexibility and AROM limitations throughout to equal bilat to reduce difficulty with ADLs  3. ? Strength: Increase L hip abd strength to 3+/5  4. ? Function: Pt to report ambulating at home with decreased low back pain  5. Independent with Home Exercise Programs        LTG: (to be met in 20 treatments)  1. Improve score on assessment tool from 54% impaired to 25% impaired, demonstrating an improvement in ADLs  2. Reduce pain levels to 0/10                    Patient goals: To \"walk normal\" and decrease back pain    Pt. Education:  [x] Yes  [] No  [] Reviewed Prior HEP/Ed  Method of Education: [] Verbal  [] Demo  [] Written  Comprehension of Education:  [] Verbalizes understanding. [] Demonstrates understanding. [] Needs review. [x] Demonstrates/verbalizes HEP/Ed previously given. Plan: [x] Continue current frequency toward long and short term goals.     [] Specific Instructions for subsequent treatments:     Frequency:  2 x/week for 20 visits      Time In: 1100            Time Out: 1150    Electronically signed by:  Maame Goldberg, PT

## 2020-12-01 NOTE — PRE-CERTIFICATION NOTE
Medicare Cap   [x] Physical Therapy  [] Speech Therapy  [] Occupational therapy  *PT and Speech caps combine      $2040 Cap limit < kx modifier needed        Patient Name: Brandy Mays: 1955    Note:  This is an estimate of charges billed.      Date of [address] Name # units/ charge $$$ charge Daily Total Charge Ongoing Total $$$   11/12/20 PT eval  Therex  Manual 1+1+1 82.82+23.22+21.70  127.74   11/16/20 2TE, 1Man (PTA)  2+1 25.26+19.74+18.45 63.45 191.19   11/19 2 manual  1 therex 2+1 27.39+21.70+23.22 72.31 263.5   11/24 2 manual  1 therex 2+1 27.39+21.70+23.22 72.31 335.81   12/1 2 manual  1 therex 2+1 27.39+21.70+23.22 72.31 408.12

## 2020-12-03 ENCOUNTER — HOSPITAL ENCOUNTER (OUTPATIENT)
Dept: PHYSICAL THERAPY | Facility: CLINIC | Age: 65
Setting detail: THERAPIES SERIES
Discharge: HOME | End: 2020-12-03
Payer: MEDICARE

## 2020-12-03 PROCEDURE — 97140 MANUAL THERAPY 1/> REGIONS: CPT

## 2020-12-03 PROCEDURE — 97110 THERAPEUTIC EXERCISES: CPT

## 2020-12-03 NOTE — FLOWSHEET NOTE
[] [facility]) [facility] &  Therapy  [address]  [phone]  F: [phone] [] [address]  [address] [facility]   [address]  P: [phone]  F: [phone] [] [address] &  Therapy  [address]  P: [phone]  F: [phone] [x] [address]  P: [phone]  F: [phone] [] [address]  [facility]   [address]   [facility] [place]: [phone]  F: [phone]      Physical Therapy Daily Treatment Note    Date:  12/3/2020  Patient Name:  Holley Alston    :  1955  MRN: [id number]  Physician: Dr. Starla Charles DO                            Insurance: Medicare   Medical Diagnosis: R thigh pain, L PSIS pain                     Rehab Codes: M79.65, M51.86, M46.1  Onset date: ~10/11/20                       Next Dr's appt.: N/A    Visit# / total visits: ; Progress note for Medicare patient due at visit 10     Cancels/No Shows: 0/0    Subjective:    Pain:  [x] Yes  [] No Location: R post thigh  Pain Rating: (0-10 scale) 3/10  Pain altered Tx:  [x] No  [] Yes  Action:  Comments: Pt reports decreased pain today rating at 3/10 in R post thigh and L piriformis. Objective:  Exercises:  Exercise  Low back pain Reps/ Time Weight/ Level Comments    NUSTEP  8' L5   x   SB Gastroc Str 2x30\"     x   Stool HS Str  2x30\"   x              *Clamshells 3x10     x   *Bridges 2x10, 2\" hold    cues for TA first  x   Supine march +TA  2x10   x   *LTR 2x10     x   *Supine piriformis stretch 3x30\"     x                                Other: *Distributed as HEP     Manual: Hypervolt to bilateral piriformis, R HS     Integrative Dry Needling: LLE piriformis, bilateral L3/L4, L4/L5 paravertebrals.  Homeostatic point: L sided inferior cluneal.    Treatment 1:00pm    Electronically signed by:  Dale Diaz PTA

## 2020-12-04 LAB
RETINYL PALMITATE: 0.03 MG/L (ref 0–0.1)
VITAMIN A LEVEL: 0.45 MG/L (ref 0.3–1.2)
VITAMIN A, INTERP: NORMAL
ZINC: 97.2 UG/DL (ref 60–120)

## 2020-12-05 LAB — VITAMIN B1 WHOLE BLOOD: 169 NMOL/L (ref 70–180)

## 2020-12-08 ENCOUNTER — OFFICE VISIT (OUTPATIENT)
Dept: BARIATRICS/WEIGHT MGMT | Age: 65
End: 2020-12-08
Payer: MEDICARE

## 2020-12-08 ENCOUNTER — HOSPITAL ENCOUNTER (OUTPATIENT)
Dept: PHYSICAL THERAPY | Facility: CLINIC | Age: 65
Setting detail: THERAPIES SERIES
Discharge: HOME | End: 2020-12-08
Payer: MEDICARE

## 2020-12-08 VITALS
RESPIRATION RATE: 16 BRPM | BODY MASS INDEX: 33.49 KG/M2 | HEIGHT: 65 IN | DIASTOLIC BLOOD PRESSURE: 68 MMHG | HEART RATE: 68 BPM | WEIGHT: 201 LBS | TEMPERATURE: 96.8 F | SYSTOLIC BLOOD PRESSURE: 98 MMHG

## 2020-12-08 PROBLEM — E66.01 MORBID OBESITY DUE TO EXCESS CALORIES (HCC): Status: RESOLVED | Noted: 2019-10-23 | Resolved: 2020-12-08

## 2020-12-08 PROCEDURE — 1036F TOBACCO NON-USER: CPT | Performed by: NURSE PRACTITIONER

## 2020-12-08 PROCEDURE — G8484 FLU IMMUNIZE NO ADMIN: HCPCS | Performed by: NURSE PRACTITIONER

## 2020-12-08 PROCEDURE — 3017F COLORECTAL CA SCREEN DOC REV: CPT | Performed by: NURSE PRACTITIONER

## 2020-12-08 PROCEDURE — 97140 MANUAL THERAPY 1/> REGIONS: CPT

## 2020-12-08 PROCEDURE — G8400 PT W/DXA NO RESULTS DOC: HCPCS | Performed by: NURSE PRACTITIONER

## 2020-12-08 PROCEDURE — 97110 THERAPEUTIC EXERCISES: CPT

## 2020-12-08 PROCEDURE — 1090F PRES/ABSN URINE INCON ASSESS: CPT | Performed by: NURSE PRACTITIONER

## 2020-12-08 PROCEDURE — 4040F PNEUMOC VAC/ADMIN/RCVD: CPT | Performed by: NURSE PRACTITIONER

## 2020-12-08 PROCEDURE — G8427 DOCREV CUR MEDS BY ELIG CLIN: HCPCS | Performed by: NURSE PRACTITIONER

## 2020-12-08 PROCEDURE — 1123F ACP DISCUSS/DSCN MKR DOCD: CPT | Performed by: NURSE PRACTITIONER

## 2020-12-08 PROCEDURE — G8417 CALC BMI ABV UP PARAM F/U: HCPCS | Performed by: NURSE PRACTITIONER

## 2020-12-08 PROCEDURE — 99213 OFFICE O/P EST LOW 20 MIN: CPT | Performed by: NURSE PRACTITIONER

## 2020-12-08 NOTE — PROGRESS NOTES
Medical Nutrition Therapy  Metabolic and Bariatric surgery  3 month follow up        Pt reports:         Vitals:   Vitals:    12/08/20 1119   BP: 98/68   Site: Right Upper Arm   Position: Sitting   Cuff Size: Large Adult   Pulse: 68   Resp: 16   Temp: 96.8 °F (36 °C)   TempSrc: Infrared   Weight: 201 lb (91.2 kg)   Height: 5' 5\" (1.651 m)      Body mass index is 33.45 kg/m². Labs reviewed:     Multivitamin/mineral intake:3 daily  Calcium intake:   2 daily  Other:            Nutrition Assessment:   PES: Inadequate food and beverage intake r/t WLS as evidenced by loss of excess body weight 49lb. Goals   60-80gm of protein  48-64oz of fluid  Vitamin adherance  Basic adherance to WLS behavious and this document has been scanned into the chart.        [x] met     []  Not met        Plan:   F/u 6 months after surgery         Jose Luis Palma

## 2020-12-08 NOTE — PRE-CERTIFICATION NOTE
Medicare Cap   [x] Physical Therapy  [] Speech Therapy  [] Occupational therapy  *PT and Speech caps combine      $2040 Cap limit < kx modifier needed        Patient Name: Pratik Mccray: 1955    Note:  This is an estimate of charges billed.      Date of [address] Name # units/ charge $$$ charge Daily Total Charge Ongoing Total $$$   11/12/20 PT eval  Therex  Manual 1+1+1 82.82+23.22+21.70  127.74   11/16/20 2TE, 1Man (PTA)  2+1 25.26+19.74+18.45 63.45 191.19   11/19 2 manual  1 therex 2+1 27.39+21.70+23.22 72.31 263.5   11/24 2 manual  1 therex 2+1 27.39+21.70+23.22 72.31 335.81   12/1 2 manual  1 therex 2+1 27.39+21.70+23.22 72.31 408.12   12/3 2 manual  1 therex 2+1 27.39+21.70+23.22 72.31 480.43   12/8 2 manual   1 therex 2+1 27.39+21.70+23.22 72.31 552.74
Patient

## 2020-12-08 NOTE — FLOWSHEET NOTE
[] [facility]) - [facility]  [address]  [phone]  F: [phone] [] [address]  [address]   [address]  P: [phone]  F: [phone] [] [address]  Therapy  [address]  P: [phone]  F: [phone] [x] [address]  P: [phone]  F: [phone] [] [address]  [facility]   [address]   [place]: [phone]  F: [phone]      Physical Therapy Daily Treatment Note    Date:  2020  Patient Name:  Pradeep Huston    :  1955  MRN: [id number]  Physician: Dr. Min Baeza, DO                            Insurance: Medicare   Medical Diagnosis: R thigh pain, L PSIS pain                     Rehab Codes: M79.65, M51.86, M46.1  Onset date: ~10/11/20                       Next 's appt.: N/A    Visit# / total visits: ; Progress note for Medicare patient due at visit 10     Cancels/No Shows: 0/0    Subjective:    Pain:  [x] Yes  [] No Location: R post thigh  Pain Rating: (0-10 scale) 4/10  Pain altered Tx:  [x] No  [] Yes  Action:  Comments: Pt reports noticing an improvement overall.  States to having L sided low back pain     Objective:  Exercises:  Exercise  Low back pain Reps/ Time Weight/ Level Comments    NUSTEP  8' L5   x   SB Gastroc Str 2x30\"     x   Stool HS Str  2x30\"   x          TG squats 2x10       4 way hip 10x Active              *Clamshells 3x10     x   *Bridges 2x10, 2\" hold    cues for TA first  x   Supine march +TA  2x10   x   *LTR 2x10     x   *Supine piriformis stretch 3x30\"     x                                Other: *Distributed as HEP     Manual: Hypervolt to bilateral piriformis, R HS     Integrative Dry Needling: LLE piriformis multiple points Homeostatic point: L sided inferior cluneal.    Treatment Charges: Mins Units   []  Modalities     [x]  Ther Exercise 15 1   [x]  Manual Therapy 25 2   []  Ther Activities     []  Aquatics     []  Vasocompression     [x]  Other: Dry Needling 5 0   Total Treatment time 45 3       Assessment: [x] Progressing toward goals. Progressed hip strengthening this date via 4way active hip and TG squats which pt tolerated well with only slight increase in LLE soreness during. Pt noting decreased LLE hip/gluteal pain post manual this date. [] No change. [] Other:  [x] Patient would continue to benefit from skilled physical therapy services in order to decrease low back and piriformis tightness, increase LLE hip abd strength in order to decrease low back pain. STG/LTG    STG: (to be met in 10 treatments)  1. ? Pain: Decrease pain levels to 4/10 at worst  2. ? ROM: Increase flexibility and AROM limitations throughout to equal bilat to reduce difficulty with ADLs  3. ? Strength: Increase L hip abd strength to 3+/5  4. ? Function: Pt to report ambulating at home with decreased low back pain  5. Independent with Home Exercise Programs        LTG: (to be met in 20 treatments)  1. Improve score on assessment tool from 54% impaired to 25% impaired, demonstrating an improvement in ADLs  2. Reduce pain levels to 0/10                    Patient goals: To \"walk normal\" and decrease back pain    Pt. Education:  [x] Yes  [] No  [] Reviewed Prior HEP/Ed  Method of Education: [] Verbal  [] Demo  [] Written  Comprehension of Education:  [] Verbalizes understanding. [] Demonstrates understanding. [] Needs review. [x] Demonstrates/verbalizes HEP/Ed previously given. Plan: [x] Continue current frequency toward long and short term goals.     [] Specific Instructions for subsequent treatments:     Frequency:  2 x/week for 20 visits      Time In: 1500           Time Out: 1392    Electronically signed by:  Amelie Guo PT

## 2020-12-08 NOTE — PROGRESS NOTES
Post-op Bariatric Surgery Note    Subjective     Patient is 3 months s/p laparoscopic sleeve gastrectomy and hiatal hernia repair, down 49 lbs. Overall, doing well. Incisions well healed. Consistent use of MVI and calcium. Physical activity includes PT for back and walking outdoors. No pain or other specific problems or concerns. Allergies: Allergies   Allergen Reactions    Percocet [Oxycodone-Acetaminophen] Anaphylaxis     Can't breathe    Sulfa Antibiotics Anaphylaxis     Can't breathe    Linzess [Linaclotide] Other (See Comments)     seizure    Tramadol Other (See Comments)     seizure       Past Medical History:     Past Medical History:   Diagnosis Date    Abdominal pain     Arthritis     Celiac artery compression syndrome (HCC)     Depression     Fibromyalgia     GERD (gastroesophageal reflux disease)     Hx of blood clots     PE after back surgery    Hyperlipidemia     Kidney stones      passed spontaneously    Migraines     Sleep apnea     c-pap instructed to bring Dr. Alexa Russo examination     GRANT Zabala DO to see on 20 for clearance   .     Past Surgical History:  Past Surgical History:   Procedure Laterality Date    BLADDER SUSPENSION       SECTION      COLONOSCOPY      CYSTOSCOPY Right 5/12/15    with laser litho and rt. stent insertion    JOINT REPLACEMENT      LASIK Bilateral     LUMBAR FUSION      OTHER SURGICAL HISTORY      surgery for celiac artery compression syndrome    SHOULDER ARTHROPLASTY Right     SLEEVE GASTRECTOMY  2020    laparoscopic, robotic, hiatal hernia repair, EGD    SLEEVE GASTRECTOMY N/A 2020    XI LAPAROSCOPIC ROBOTIC GASTRECTOMY SLEEVE,  hiatal hernia repair, EGD performed by Ara Pulido DO at [address] [place] [address] ENDOSCOPY      UPPER GASTROINTESTINAL ENDOSCOPY N/A 2019    EGD BIOPSY [facility] at [facility]    Not on file   Social History Narrative    Not on file       Current Medications:  Current Outpatient Medications   Medication Sig Dispense Refill    Calcium Carbonate Antacid (TUMS PO) Take by mouth      Multiple Vitamins-Minerals (CELEBRATE MULTI-COMPLETE 36) CHEW Take by mouth      MAGNESIUM MALATE PO Take 1,000 mg by mouth daily 3750mg      VITAMIN D PO Take 25 mcg by mouth daily Will bring dose day of surgery      pravastatin (PRAVACHOL) 20 MG tablet Take 1 tablet by mouth daily  3    melatonin 3 MG TABS tablet Take 10 mg by mouth nightly as needed       pantoprazole (PROTONIX) 40 MG tablet Take 40 mg by mouth      verapamil (CALAN) 120 MG tablet Take 180 mg by mouth nightly       APAP-Isometheptene-Dichloral (MIDRIN PO) Take 1 tablet by mouth as needed 65mg      RIBOFLAVIN PO Take 400 mg by mouth daily      ondansetron (ZOFRAN) 4 MG tablet Take 1 tablet by mouth every 8 hours as needed for Nausea or Vomiting (Patient not taking: Reported on 12/8/2020) 30 tablet 2     No current facility-administered medications for this visit. Vital Signs:  BP 98/68 (Site: Right Upper Arm, Position: Sitting, Cuff Size: Large Adult)   Pulse 68   Temp 96.8 °F (36 °C) (Infrared)   Resp 16   Ht 5' 5\" (1.651 m)   Wt 201 lb (91.2 kg)   LMP 10/22/2007 (Within Months)   BMI 33.45 kg/m²     BMI/Height/Weight:  Body mass index is 33.45 kg/m². Review of Systems - A review of systems was performed. All was negative unless otherwise documented in HPI. Constitutional: Negative for fever, chills and diaphoresis. HENT: Negative for hearing loss and trouble swallowing. Eyes: Negative for photophobia and visual disturbance. Respiratory: Negative for cough, shortness of breath and wheezing. Cardiovascular: Negative for chest pain and palpitations. Gastrointestinal: Negative for nausea, vomiting, abdominal pain, diarrhea, constipation, blood in stool and abdominal distention.    Endocrine: Negative for polydipsia, polyphagia and polyuria. Genitourinary: Negative for dysuria, frequency, hematuria and difficulty urinating. Musculoskeletal: Negative for myalgias, joint swelling. Skin: Negative for pallor and rash. Neurological: Negative for dizziness, tremors, light-headedness and headaches. Psychiatric/Behavioral: Negative for sleep disturbance and dysphoric mood. Objective:      Physical Exam   Vital signs reviewed. General: Well-developed and well-nourished. No acute distress. Skin: Warm, dry and intact. HEENT: Normocephalic. EOMs intact. Conjunctivae normal. Neck supple. Cardiovascular: Normal rate, regular rhythm. Pulmonary/Chest: Normal effort. Lungs clear to auscultation. No rales, rhonchi or wheezing. Abdominal: Positive bowel sounds. Soft, nontender. Nondistended. No rigidity, rebound, or guarding. Musculoskeletal: Movement x4. No edema. Neurological: Gait normal. Alert and oriented to person, place, and time. Psychiatric: Normal mood and affect. Speech and behavior normal. Judgment and thought content normal. Cognition and memory intact. Assessment:       Diagnosis Orders   1. SAMEERA (obstructive sleep apnea)  Comprehensive Metabolic Panel    TSH without Reflex    T4, Free    Hemoglobin A1C    Vitamin B12 & Folate    Vitamin B1    Vitamin D 25 Hydroxy    Magnesium    Iron and TIBC    Vitamin A    Lipid Panel    PTH, Intact    CBC Auto Differential    Zinc    Ferritin   2. Hyperlipidemia, unspecified hyperlipidemia type  Comprehensive Metabolic Panel    TSH without Reflex    T4, Free    Hemoglobin A1C    Vitamin B12 & Folate    Vitamin B1    Vitamin D 25 Hydroxy    Magnesium    Iron and TIBC    Vitamin A    Lipid Panel    PTH, Intact    CBC Auto Differential    Zinc    Ferritin   3.  Gastroesophageal reflux disease without esophagitis  Comprehensive Metabolic Panel    TSH without Reflex    T4, Free    Hemoglobin A1C    Vitamin B12 & Folate    Vitamin B1    Vitamin TSH without Reflex    T4, Free    Hemoglobin A1C    Vitamin B12 & Folate    Vitamin B1    Vitamin D 25 Hydroxy    Magnesium    Iron and TIBC    Vitamin A    Lipid Panel    PTH, Intact    CBC Auto Differential    Zinc    Ferritin   11. History of pulmonary embolism  Comprehensive Metabolic Panel    TSH without Reflex    T4, Free    Hemoglobin A1C    Vitamin B12 & Folate    Vitamin B1    Vitamin D 25 Hydroxy    Magnesium    Iron and TIBC    Vitamin A    Lipid Panel    PTH, Intact    CBC Auto Differential    Zinc    Ferritin   12. Status post laparoscopic sleeve gastrectomy  Comprehensive Metabolic Panel    TSH without Reflex    T4, Free    Hemoglobin A1C    Vitamin B12 & Folate    Vitamin B1    Vitamin D 25 Hydroxy    Magnesium    Iron and TIBC    Vitamin A    Lipid Panel    PTH, Intact    CBC Auto Differential    Zinc    Ferritin       Plan:    Dietitian visit today. Patient to continue to increase protein to obtain 60-80g/day, at least 48-64oz of fluid daily, and gradually increase exercise regimen. Labs reviewed and discussed with patient. Acceptable. Follow-up  Return in about 3 months (around 3/8/2021).     Orders this encounter:  Orders Placed This Encounter   Procedures    Comprehensive Metabolic Panel     Standing Status:   Future     Standing Expiration Date:   12/8/2021    TSH without Reflex     Standing Status:   Future     Standing Expiration Date:   12/8/2021    T4, Free     Standing Status:   Future     Standing Expiration Date:   12/8/2021    Hemoglobin A1C     Standing Status:   Future     Standing Expiration Date:   12/8/2021    Vitamin B12 & Folate     Standing Status:   Future     Standing Expiration Date:   12/8/2021    Vitamin B1     Standing Status:   Future     Standing Expiration Date:   12/8/2021    Vitamin D 25 Hydroxy     Standing Status:   Future     Standing Expiration Date:   12/8/2021    Magnesium     Standing Status:   Future     Standing Expiration Date:   12/8/2021  Iron and TIBC     Standing Status:   Future     Standing Expiration Date:   12/8/2021     Order Specific Question:   Is Patient Fasting? Answer:   Yes     Order Specific Question:   No of Hours? Answer:   12 hours    Vitamin A     Standing Status:   Future     Standing Expiration Date:   12/8/2021    Lipid Panel     Standing Status:   Future     Standing Expiration Date:   12/8/2021     Order Specific Question:   Is Patient Fasting?/# of Hours     Answer:   12 hrs    PTH, Intact     Standing Status:   Future     Standing Expiration Date:   12/8/2021    CBC Auto Differential     Standing Status:   Future     Standing Expiration Date:   12/8/2021    Zinc     Standing Status:   Future     Standing Expiration Date:   12/9/2021    Ferritin     Standing Status:   Future     Standing Expiration Date:   12/9/2021       Prescriptions this encounter:  No orders of the defined types were placed in this encounter.       Electronically signed by:  Roseanna Yin CNP

## 2020-12-10 ENCOUNTER — HOSPITAL ENCOUNTER (OUTPATIENT)
Dept: PHYSICAL THERAPY | Facility: CLINIC | Age: 65
Setting detail: THERAPIES SERIES
Discharge: HOME | End: 2020-12-10
Payer: MEDICARE

## 2020-12-10 PROCEDURE — 97110 THERAPEUTIC EXERCISES: CPT

## 2020-12-10 PROCEDURE — 97140 MANUAL THERAPY 1/> REGIONS: CPT

## 2020-12-10 NOTE — PRE-CERTIFICATION NOTE
Medicare Cap   [x] Physical Therapy  [] Speech Therapy  [] Occupational therapy  *PT and Speech caps combine      $2040 Cap limit < kx modifier needed        Patient Name: Mitra Chacko: 1955    Note:  This is an estimate of charges billed.      Date of [address] Name # units/ charge $$$ charge Daily Total Charge Ongoing Total $$$   11/12/20 PT eval  Therex  Manual 1+1+1 82.82+23.22+21.70  127.74   11/16/20 2TE, 1Man (PTA)  2+1 25.26+19.74+18.45 63.45 191.19   11/19 2 manual  1 therex 2+1 27.39+21.70+23.22 72.31 263.5   11/24 2 manual  1 therex 2+1 27.39+21.70+23.22 72.31 335.81   12/1 2 manual  1 therex 2+1 27.39+21.70+23.22 72.31 408.12   12/3 2 manual  1 therex 2+1 27.39+21.70+23.22 72.31 480.43   12/8 2 manual   1 therex 2+1 27.39+21.70+23.22 72.31 552.74   12/10 2 manual  1 therex 2+1 27.39+21.70+23.22 72.31 625.05

## 2020-12-15 ENCOUNTER — HOSPITAL ENCOUNTER (OUTPATIENT)
Dept: PHYSICAL THERAPY | Facility: CLINIC | Age: 65
Setting detail: THERAPIES SERIES
Discharge: HOME | End: 2020-12-15
Payer: MEDICARE

## 2020-12-15 PROCEDURE — 97140 MANUAL THERAPY 1/> REGIONS: CPT

## 2020-12-15 PROCEDURE — 97110 THERAPEUTIC EXERCISES: CPT

## 2020-12-15 NOTE — FLOWSHEET NOTE
[] [address]  [address]  [phone]  F: [phone] [] [address]  [facility]   [address]  P: [phone]  F: [phone] [] [address]  Therapy  [address]  P: [phone]  F: [phone] [x] [address]  P: [phone]  F: [phone] [] [address]  [facility]   [address]   [place]: [phone]  F: [phone]      Physical Therapy Daily Treatment Note    Date:  12/15/2020  Patient Name:  Hudson Welsh    :  1955  MRN: [id number]  Physician: Dr. Ulysses Jamison, DO                            Insurance: Medicare   Medical Diagnosis: R thigh pain, L PSIS pain                     Rehab Codes: M79.65, M51.86, M46.1  Onset date: ~10/11/20                       Next Dr's appt.: N/A    Visit# / total visits: ; Progress note for Medicare patient due at visit 10     Cancels/No Shows: 0/0    Subjective:    Pain:  [x] Yes  [] No Location: R post thigh  Pain Rating: (0-10 scale) 2/10  Pain altered Tx:  [x] No  [] Yes  Action:  Comments: Pt arrives with decreased low back pain, which pt does attribute to taking medical marijuana      Objective:  Exercises:  Exercise  Low back pain Reps/ Time Weight/ Level Comments    NUSTEP  5' L5   x   SB Gastroc Str 2x30\"     x   Stool HS Str  2x30\"   x          TG squats 2x10       4 way hip 10x Active              *Clamshells 3x10     x   *Bridges 2x10, 2\" hold    cues for TA first  x   Supine march +TA  2x10   x   *LTR 2x10     x   *Supine piriformis stretch 3x30\"     x                                Other: *Distributed as HEP     Manual: Hypervolt to L piriformis, DI to L piriformis   Posterior 9th/10th rib noted on L, pt also with FRSL T9/T10- corrected via MOB. MFR to lower thoracic paraspinals with pain relief noted post.     Integrative Dry Needling: L L3/L4, L4/L5 paravertebrals Homeostatic point: Multiple L sided inferior cluneal points    Treatment Charges: Mins Units   []  Modalities     [x]  Ther Exercise 15 1   [x]  Manual Therapy 25 2   []  Ther Activities     []  Aquatics     []  Vasocompression     [x]  Other: Dry Needling 5 0   Total Treatment time 45 3       Assessment: [x] Progressing toward goals. Again focusing on manual and stretching this date d/t pt arriving noting pain relief after last session. Pt with noted  tightness post manual (listed above) and stretching this date. [] No change. [] Other:  [x] Patient would continue to benefit from skilled physical therapy services in order to decrease low back and piriformis tightness, increase LLE hip abd strength in order to decrease low back pain. STG/LTG    STG: (to be met in 10 treatments)  1. ? Pain: Decrease pain levels to 4/10 at worst  2. ? ROM: Increase flexibility and AROM limitations throughout to equal bilat to reduce difficulty with ADLs  3. ? Strength: Increase L hip abd strength to 3+/5  4. ? Function: Pt to report ambulating at home with decreased low back pain  5. Independent with Home Exercise Programs        LTG: (to be met in 20 treatments)  1. Improve score on assessment tool from 54% impaired to 25% impaired, demonstrating an improvement in ADLs  2. Reduce pain levels to 0/10                    Patient goals: To \"walk normal\" and decrease back pain    Pt. Education:  [x] Yes  [] No  [] Reviewed Prior HEP/Ed  Method of Education: [] Verbal  [] Demo  [] Written  Comprehension of Education:  [] Verbalizes understanding. [] Demonstrates understanding. [] Needs review. [x] Demonstrates/verbalizes HEP/Ed previously given. Plan: [x] Continue current frequency toward long and short term goals.     [] Specific Instructions for subsequent treatments:     Frequency:  2 x/week for 20 visits      Time In: 1000           Time Out: 1050    Electronically signed by:  Bryson Bagley PT

## 2020-12-17 ENCOUNTER — HOSPITAL ENCOUNTER (OUTPATIENT)
Dept: PHYSICAL THERAPY | Facility: CLINIC | Age: 65
Setting detail: THERAPIES SERIES
Discharge: HOME | End: 2020-12-17
Payer: MEDICARE

## 2020-12-17 PROCEDURE — 97110 THERAPEUTIC EXERCISES: CPT

## 2020-12-17 PROCEDURE — 97140 MANUAL THERAPY 1/> REGIONS: CPT

## 2020-12-17 NOTE — PRE-CERTIFICATION NOTE
Medicare Cap   [x] Physical Therapy  [] Speech Therapy  [] Occupational therapy  *PT and Speech caps combine      $2040 Cap limit < kx modifier needed        Patient Name: Annette Leary: 1955    Note:  This is an estimate of charges billed.      Date of [address] Name # units/ charge $$$ charge Daily Total Charge Ongoing Total $$$   11/12/20 PT eval  Therex  Manual 1+1+1 82.82+23.22+21.70  127.74   11/16/20 2TE, 1Man (PTA)  2+1 25.26+19.74+18.45 63.45 191.19   11/19 2 manual  1 therex 2+1 27.39+21.70+23.22 72.31 263.5   11/24 2 manual  1 therex 2+1 27.39+21.70+23.22 72.31 335.81   12/1 2 manual  1 therex 2+1 27.39+21.70+23.22 72.31 408.12   12/3 2 manual  1 therex 2+1 27.39+21.70+23.22 72.31 480.43   12/8 2 manual   1 therex 2+1 27.39+21.70+23.22 72.31 552.74   12/10 2 manual  1 therex 2+1 27.39+21.70+23.22 72.31 625.05   12/15 2 manual  1 therex 2+1 27.39+21.70+23.22 72.31    12/17 2 manual  1 therex 2+1 27.39+21.70+23.22 72.31 769.67

## 2020-12-17 NOTE — FLOWSHEET NOTE
[] [address]  [address]  [phone]  F: [phone] [] [address]  [facility]   [address]  P: [phone]  F: [phone] [] [place]  [address]  P: [phone]  F: [phone] [x] [address]  P: [phone]  F: [phone] [] [address]  [facility]   [address]   [place]: [phone]  F: [phone]      Physical Therapy Daily Treatment Note    Date:  2020  Patient Name:  Lisa Nguyen    :  1955  MRN: [id number]  Physician: Dr. Vannesa Roy, DO                            Insurance: Medicare   Medical Diagnosis: R thigh pain, L PSIS pain                     Rehab Codes: M79.65, M51.86, M46.1  Onset date: ~10/11/20                       Next 's appt.: N/A    Visit# / total visits: 10/20; Progress note for Medicare patient due at visit 10     Cancels/No Shows: 0/0    Subjective:    Pain:  [x] Yes  [] No Location: R post thigh  Pain Rating: (0-10 scale) 3/10  Pain altered Tx:  [x] No  [] Yes  Action:  Comments: Pt arrives with decreased low back pain, states \"its there\" but improving     Objective:  Exercises:  Exercise  Low back pain Reps/ Time Weight/ Level Comments    NUSTEP  5' L5   x   SB Gastroc Str 2x30\"     x   Stool HS Str  2x30\"   x          TG squats 2x10       4 way hip 10x Active              *Clamshells 3x10     x   *Bridges 2x10, 2\" hold    cues for TA first  x   Supine march +TA  2x10   x   *LTR 2x10     x   *Supine piriformis stretch 3x30\"     x                                Other: *Distributed as HEP      Manual: Hypervolt to L piriformis, lumbar and thoracic paraspinals      Integrative Dry Needling: L L3/L4, L4/L5 paravertebrals, L pirifromis Homeostatic point: Multiple L sided inferior cluneal points    Treatment Charges: Mins Units   []  Modalities     [x]  Ther Exercise 25 2   [x]  Manual Therapy 25 2   []  Ther Activities     []  Aquatics     []  Vasocompression     [x]  Other: Dry Needling 5 0   Total Treatment time 60 4       Assessment: [x] Progressing toward goals. Added strengthening back into program, which pt tolerated well noting no increase in pain, however quick to fatigue. Addressed goals this date (see below). Pt states to most noticing increased ability to walk around home, as well as complete daily tasks, with less pain. Pt has noticed an improvement with therapy and would benefit from continued therapy in order to continue to progress strength as pt's pain continues to decrease. [] No change. [] Other:  [x] Patient would continue to benefit from skilled physical therapy services in order to decrease low back and piriformis tightness, increase LLE hip abd strength in order to decrease low back pain. STG/LTG    STG: (to be met in 10 treatments) updated on 12/17  1. ? Pain: Decrease pain levels to 4/10 at worst- MET  2. ? ROM: Increase flexibility and AROM limitations throughout to equal bilat to reduce difficulty with ADLs  3. ? Strength: Increase L hip abd strength to 3+/5- MET, currently at 3+/5  4. ? Function: Pt to report ambulating at home with decreased low back pain- MET  5. Independent with Home Exercise Programs- MET        LTG: (to be met in 20 treatments)  1. Improve score on assessment tool from 54% impaired to 25% impaired, demonstrating an improvement in ADLs- Progressing  2. Reduce pain levels to 0/10- Progressing                    Patient goals: To \"walk normal\" and decrease back pain    Pt. Education:  [x] Yes  [] No  [] Reviewed Prior HEP/Ed  Method of Education: [x] Verbal  [x] Demo  [] Written  Comprehension of Education:  [x] Verbalizes understanding. [x] Demonstrates understanding.   [] Needs

## 2020-12-17 NOTE — PROGRESS NOTES
Physical Therapy    [] [facility]) - [facility] &  Therapy  [address]  [phone]  F: [phone] [] [address]  [facility]   [address]  P: [phone]  F: [phone] [] [address] &  Therapy  [address]  P: [phone]  F: [phone] [x] [address]  P: [phone]  F: [phone] [] [address]  [facility]   [address]   [place]: [phone]  F: [phone]      Physical Therapy Progress Note    Date: 2020      Patient: Moses Srinivasan  : 1955  MRN: [id number]    Physician: Dr. Gianfranco Welsh: [address] [facility] Diagnosis: R thigh pain, L PSIS pain                     Rehab Codes: M79.65, M51.86, M46.1  Onset date: ~10/11/20                       Next 's appt.: N/A     Visit# / total visits: 10/20; Progress note for Medicare patient due at visit 10                                  Cancels/No Shows: 0/0  Total visits attended: 10  Date range of services: 20 to 20        Subjective:    Pain:  [x]? Yes  []? No   Location: R post thigh             Pain Rating: (0-10 scale) 3/10  Pain altered Tx:  [x]? No  []? Yes  Action:  Comments: Pt arrives with decreased low back pain, states \"its there\" but improving            Assessment:  [x]? Progressing toward goals. Added strengthening back into program, which pt tolerated well noting no increase in pain, however quick to fatigue. Addressed goals this date (see below). Pt states to most noticing increased ability to walk around home, as well as complete daily tasks, with less pain.  Pt has noticed an improvement with therapy and would benefit from continued therapy in order to continue to progress strength as pt's pain continues to decrease. []? No change. []? Other:  [x]? Patient would continue to benefit from skilled physical therapy services in order to decrease low back and piriformis tightness, increase LLE hip abd strength in order to decrease low back pain.      STG/LTG     STG: (to be met in 10 treatments) updated on 12/17  1. ? Pain: Decrease pain levels to 4/10 at worst- MET  2. ? ROM: Increase flexibility and AROM limitations throughout to equal bilat to reduce difficulty with ADLs  3. ? Strength: Increase L hip abd strength to 3+/5- MET, currently at 3+/5  4. ? Function: Pt to report ambulating at home with decreased low back pain- MET  5. Independent with Home Exercise Programs- MET        LTG: (to be met in 20 treatments)  1. Improve score on assessment tool from 54% impaired to 25% impaired, demonstrating an improvement in ADLs- Progressing  2. Reduce pain levels to 0/10- Progressing                    Patient goals: To \"walk normal\" and decrease back pain    Treatment Plan:  [x] Therapeutic Exercise   28083  [] Iontophoresis: 4 mg/mL Dexamethasone Sodium Phosphate  mAmin  93800   [] Therapeutic Activity  56314 [] Vasopneumatic cold with compression  12509    [] Gait Training   26497 [] Ultrasound   70955   [] Neuromuscular Re-education  75191 [] Electrical Stimulation Unattended  98605   [x] Manual Therapy  26244 [] Electrical Stimulation Attended  57290   [x] Instruction in HEP  [] Lumbar/Cervical Traction  00043   [] Aquatic Therapy   73831 [] Cold/hotpack    [] Massage   59723      [] Dry Needling, 1 or 2 muscles  94088   [] Biofeedback, first 15 minutes   20914  [] Biofeedback, additional 15 minutes   77034 [] Dry Needling, 3 or more muscles  58342       Patient Status:     [x] Continue per initial plan of care. [] Additional visits necessary.     [] Other:             Electronically signed by Debbie White PT on 12/17/2020 at 11:22 AM      If you have any questions or concerns, please don't hesitate to call. Thank you for your referral.    Physician Signature:________________________________Date:__________________  By signing above or cosigning this note, I have reviewed this plan of care and certify a need for medically necessary rehabilitation services.      *PLEASE SIGN ABOVE AND FAX BACK ALL PAGES*

## 2020-12-22 ENCOUNTER — HOSPITAL ENCOUNTER (OUTPATIENT)
Dept: PHYSICAL THERAPY | Facility: CLINIC | Age: 65
Setting detail: THERAPIES SERIES
Discharge: HOME | End: 2020-12-22
Payer: MEDICARE

## 2020-12-22 PROCEDURE — 97110 THERAPEUTIC EXERCISES: CPT

## 2020-12-22 PROCEDURE — 97140 MANUAL THERAPY 1/> REGIONS: CPT

## 2020-12-22 NOTE — FLOWSHEET NOTE
[] [facility]) - [facility] &  Therapy  [address]  [phone]  F: [phone] [] [address]  [employer] [address]   [address]  P: [phone]  F: [phone] [] [address] &  Therapy  [address]  P: [phone]  F: [phone] [x] [address]  P: [phone]  F: [phone] [] [address]  [facility]   [address]   [place]: [phone]  F: [phone]      Physical Therapy Daily Treatment Note    Date:  2020  Patient Name:  Basia Doyle    :  1955  MRN: [id number]  Physician: Dr. Edwar Plummer DO                            Insurance: Medicare   Medical Diagnosis: R thigh pain, L PSIS pain                     Rehab Codes: M79.65, M51.86, M46.1  Onset date: ~10/11/20                       Next 's appt.: N/A    Visit# / total visits: ; Progress note for Medicare patient due at visit 20     Cancels/No Shows: 0/0    Subjective:    Pain:  [x] Yes  [] No Location: R post thigh  Pain Rating: (0-10 scale) 3/10  Pain altered Tx:  [x] No  [] Yes  Action:  Comments: Pt arrives with decreased low back pain, states it is now more of a \"spasm/tightness\". Had no increased soreness after last visit.      Objective:  Exercises:  Exercise  Low back pain Reps/ Time Weight/ Level Comments    NUSTEP  5' L5   x   SB Gastroc Str 2x30\"     x   Stool HS Str  2x30\"   x          TG squats 2x10       4 way hip 10x Active              *Clamshells 3x10     x   *Bridges 2x10, 2\" hold    cues for TA first  x   Supine march +TA  2x10   x   *LTR 2x10     x   *Supine piriformis stretch 3x30\"     x                                Other: *Distributed as HEP      Manual: Hypervolt to L piriformis, lumbar and thoracic paraspinals      Integrative Dry Needling: L L3/L4, L4/L5 paravertebrals, L pirifromis Homeostatic point: Multiple L sided inferior cluneal points    Treatment Charges: Mins Units   []  Modalities     [x]  Ther Exercise 25 2   [x]  Manual Therapy 25 2   []  Ther Activities     []  Aquatics     []  Vasocompression     [x]  Other: Dry Needling 5 0   Total Treatment time 60 4       Assessment: [x] Progressing toward goals. Continued with strengthening, pt with noted difficulty during LLE CKC d/t weakness; able to complete. Continued with Dry Needling and manual, pt with decreased tightness post. Encouraged pt to attempt 4way hip active at home, in order to progress strengthening in HEP, pt voiced understanding. [] Other:  [x] Patient would continue to benefit from skilled physical therapy services in order to decrease low back and piriformis tightness, increase LLE hip abd strength in order to decrease low back pain. STG/LTG    STG: (to be met in 10 treatments) updated on 12/17  1. ? Pain: Decrease pain levels to 4/10 at worst- MET  2. ? ROM: Increase flexibility and AROM limitations throughout to equal bilat to reduce difficulty with ADLs  3. ? Strength: Increase L hip abd strength to 3+/5- MET, currently at 3+/5  4. ? Function: Pt to report ambulating at home with decreased low back pain- MET  5. Independent with Home Exercise Programs- MET        LTG: (to be met in 20 treatments)  1. Improve score on assessment tool from 54% impaired to 25% impaired, demonstrating an improvement in ADLs- Progressing  2. Reduce pain levels to 0/10- Progressing                    Patient goals: To \"walk normal\" and decrease back pain    Pt. Education:  [x] Yes  [] No  [] Reviewed Prior HEP/Ed  Method of Education: [x] Verbal  [x] Demo  [] Written  Comprehension of Education:  [x] Verbalizes understanding. [x] Demonstrates understanding. [] Needs review. [] Demonstrates/verbalizes HEP/Ed previously given.      Plan: [x] Continue current frequency toward

## 2020-12-24 ENCOUNTER — HOSPITAL ENCOUNTER (OUTPATIENT)
Dept: PHYSICAL THERAPY | Facility: CLINIC | Age: 65
Setting detail: THERAPIES SERIES
Discharge: HOME | End: 2020-12-24
Payer: MEDICARE

## 2020-12-24 PROCEDURE — 97110 THERAPEUTIC EXERCISES: CPT

## 2020-12-24 NOTE — FLOWSHEET NOTE
[] [facility] - [facility] &  Therapy  [address]  [phone]  F: [phone] [] [address]  [facility]   [address]  P: [phone]  F: [phone] [] [address] &  Therapy  [address]  P: [phone]  F: [phone] [x] [address]  P: [phone]  F: [phone] [] [address]  [facility]   [address]   [place]: [phone]  F: [phone]      Physical Therapy Daily Treatment Note    Date:  2020  Patient Name:  Giovanni Mackenzie    :  1955  MRN: [id number]  Physician: Dr. Luca Sanchez,                             Insurance: Medicare   Medical Diagnosis: R thigh pain, L PSIS pain                     Rehab Codes: M79.65, M51.86, M46.1  Onset date: ~10/11/20                       Next 's appt.: N/A    Visit# / total visits: ; Progress note for Medicare patient due at visit 20     Cancels/No Shows: 0/0    Subjective:    Pain:  [x] Yes  [] No Location: R post thigh  Pain Rating: (0-10 scale) 4/10  Pain altered Tx:  [x] No  [] Yes  Action:  Comments: Pt arrives states that pain is \"about the same\", did have slight increase in pain last night.      Objective:  Exercises:  Exercise  Low back pain Reps/ Time Weight/ Level Comments    NUSTEP  5' L5   x   SB Gastroc Str 2x30\"     x   Stool HS Str  2x30\"   x          TG squats 2x10   x    4 way hip 10x Active    x          *Clamshells 3x10  orange    x   *Bridges 2x10, 2\" hold    cues for TA first  x   Supine march +TA  2x10   x   *LTR 2x10     x   *Supine piriformis stretch 3x30\"     x    sidelying hip abd 2x10   Slight AAROM required for LLE x                     Other: *Distributed as HEP      Manual: Hypervolt to L piriformis, lumbar and thoracic paraspinals- not today       Integrative Dry Needling: L L3/L4, L4/L5 paravertebrals, L pirifromis Homeostatic point: Multiple L sided inferior cluneal points- not today     Treatment Charges: Mins Units   []  Modalities     [x]  Ther Exercise 40 3   []  Manual Therapy     []  Ther Activities     []  Aquatics     []  Vasocompression     []  Other: Total Treatment time 45 3       Assessment: [x] Progressing toward goals. Focused on strengthening this date, progressing difficulty of clamshells by adding orange Tband and adding in sidyling hip abd. Pt tolerated well with no increase in pain or soreness post. Pt declined Dry needling to focus on strengthening. [] Other:  [x] Patient would continue to benefit from skilled physical therapy services in order to decrease low back and piriformis tightness, increase LLE hip abd strength in order to decrease low back pain. STG/LTG    STG: (to be met in 10 treatments) updated on 12/17  1. ? Pain: Decrease pain levels to 4/10 at worst- MET  2. ? ROM: Increase flexibility and AROM limitations throughout to equal bilat to reduce difficulty with ADLs  3. ? Strength: Increase L hip abd strength to 3+/5- MET, currently at 3+/5  4. ? Function: Pt to report ambulating at home with decreased low back pain- MET  5. Independent with Home Exercise Programs- MET        LTG: (to be met in 20 treatments)  1. Improve score on assessment tool from 54% impaired to 25% impaired, demonstrating an improvement in ADLs- Progressing  2. Reduce pain levels to 0/10- Progressing                    Patient goals: To \"walk normal\" and decrease back pain    Pt. Education:  [x] Yes  [] No  [] Reviewed Prior HEP/Ed  Method of Education: [x] Verbal  [x] Demo  [] Written  Comprehension of Education:  [x] Verbalizes understanding. [x] Demonstrates understanding. [] Needs review. [] Demonstrates/verbalizes HEP/Ed previously given. Plan: [x] Continue current frequency toward long and short term goals. Frequency:  2 x/week for 20 visits      Time In: 9145           Time Out: 1150    Electronically signed by:  Naheed Mckeon PT

## 2020-12-24 NOTE — PRE-CERTIFICATION NOTE
Medicare Cap   [x] Physical Therapy  [] Speech Therapy  [] Occupational therapy  *PT and Speech caps combine      $2040 Cap limit < kx modifier needed        Patient Name: Jerry Gaitan: 1955    Note:  This is an estimate of charges billed.      Date of [address] Name # units/ charge $$$ charge Daily Total Charge Ongoing Total $$$   11/12/20 PT eval  Therex  Manual 1+1+1 82.82+23.22+21.70  127.74   11/16/20 2TE, 1Man (PTA)  2+1 25.26+19.74+18.45 63.45 191.19   11/19 2 manual  1 therex 2+1 27.39+21.70+23.22 72.31 263.5   11/24 2 manual  1 therex 2+1 27.39+21.70+23.22 72.31 335.81   12/1 2 manual  1 therex 2+1 27.39+21.70+23.22 72.31 408.12   12/3 2 manual  1 therex 2+1 27.39+21.70+23.22 72.31 480.43   12/8 2 manual   1 therex 2+1 27.39+21.70+23.22 72.31 552.74   12/10 2 manual  1 therex 2+1 27.39+21.70+23.22 72.31 625.05   12/15 2 manual  1 therex 2+1 27.39+21.70+23.22 72.31    12/17 2 manual  1 therex 2+1 27.39+21.70+23.22 72.31 769.67   12/22 2 manual  2 therex 2+2 29.72+23.22+21.70+21.70 96.34 866.01   12/24 3 Therex 3 29.72+23.22+23.22 76.16 942.17

## 2020-12-29 ENCOUNTER — HOSPITAL ENCOUNTER (OUTPATIENT)
Dept: PHYSICAL THERAPY | Facility: CLINIC | Age: 65
Setting detail: THERAPIES SERIES
Discharge: HOME | End: 2020-12-29
Payer: MEDICARE

## 2020-12-29 PROCEDURE — 97110 THERAPEUTIC EXERCISES: CPT

## 2020-12-29 NOTE — FLOWSHEET NOTE
[] [facility]) - [facility] &  Therapy  [address]  [phone]  F: [phone] [] [address]  [facility]   [address]  P: [phone]  F: [phone] [] [address] &  Therapy  [address]  P: [phone]  F: [phone] [x] [address]  P: [phone]  F: [phone] [] [address]  [facility]   [address]   [place]: [phone]  F: [phone]      Physical Therapy Daily Treatment Note    Date:  2020  Patient Name:  Mellissa Caballero    :  1955  MRN: [id number]  Physician: Dr. Avril Macario DO                            Insurance: Medicare   Medical Diagnosis: R thigh pain, L PSIS pain                     Rehab Codes: M79.65, M51.86, M46.1  Onset date: ~10/11/20                       Next 's appt. :     Visit# / total visits: ; Progress note for Medicare patient due at visit 20     Cancels/No Shows: 0/0    Subjective:    Pain:  [x] Yes  [] No Location: R post thigh  Pain Rating: (0-10 scale) 4/10  Pain altered Tx:  [x] No  [] Yes  Action:  Comments: Pt reports increased pain over the weekend with no known cause, rating it at 7/10 at worst, currently pain is reported at 4/10, L> R LBP. Pt also states pain cont to radiate down into L buttock region. Pt reports frustration with continued pain.      Objective:  Exercises:  Exercise  Low back pain Reps/ Time Weight/ Level Comments    NUSTEP  5' L5   x   SB Gastroc Str 2x30\"     x   Stool HS Str  2x30\"   x          TG squats 3x10 L17  x    4 way hip 2x10 Active    x          *Clamshells 3x10  orange    x   *Bridges+TA 2x10, 3\" hold     x   Supine march +TA  2x10   x   BKFO +TA 15x   x   *LTR 2x10    x   *Supine piriformis stretch 3x30\"     x    sidelying hip abd 2x10   Slight AAROM required for LLE -                     Other: *Distributed as HEP      Manual: Hypervolt to L piriformis, lumbar paraspinals- resumed 12/29/20      Integrative Dry Needling: L L3/L4, L4/L5 paravertebrals, L pirifromis Homeostatic point: Multiple L sided inferior cluneal points- not today     Treatment Charges: Mins Units   []  Modalities     [x]  Ther Exercise 45 3   [x]  Manual Therapy 5 N/C   []  Ther Activities     []  Aquatics     []  Vasocompression     []  Other: Total Treatment time 50 3       Assessment: [x] Progressing toward goals. Cont focusing on strengthening this date with incr resistance and reps with Total gym as listed above, with appropriate fatigue reported in B quads after. Pt req min to mod cues throughout 4 way hip for TA activation and to decr WB through B UEs, good carry through noted; also incr reps to 2x10 with good jermaine. Modified LTR rocking for lumbar mobility with cues for TA contraction and decr motion to avoid \"clunking\" in lumbar vertebrae region. PTA advised pt to discuss continued issues with referring physician at next appointment which is scheduled for early next week. Resumed manual as listed above for pain control, pt reports decr pain after interventions this date. [] Other:  [x] Patient would continue to benefit from skilled physical therapy services in order to decrease low back and piriformis tightness, increase LLE hip abd strength in order to decrease low back pain. STG/LTG  STG: (to be met in 10 treatments) updated on 12/17  1. ? Pain: Decrease pain levels to 4/10 at worst- MET  2. ? ROM: Increase flexibility and AROM limitations throughout to equal bilat to reduce difficulty with ADLs  3. ? Strength: Increase L hip abd strength to 3+/5- MET, currently at 3+/5  4. ? Function: Pt to report ambulating at home with decreased low back pain- MET  5. Independent with Home Exercise Programs- MET        LTG: (to be met in 20 treatments)  1.  Improve score

## 2020-12-31 ENCOUNTER — HOSPITAL ENCOUNTER (OUTPATIENT)
Dept: PHYSICAL THERAPY | Facility: CLINIC | Age: 65
Setting detail: THERAPIES SERIES
Discharge: HOME | End: 2020-12-31
Payer: MEDICARE

## 2020-12-31 PROCEDURE — 97110 THERAPEUTIC EXERCISES: CPT

## 2020-12-31 PROCEDURE — 97140 MANUAL THERAPY 1/> REGIONS: CPT

## 2020-12-31 NOTE — FLOWSHEET NOTE
[] [facility]) - [facility] &  Therapy  [address]  [phone]  F: [phone] [] [address]  [facility]   [address]  P: [phone]  F: [phone] [] [address] &  Therapy  [address]  P: [phone]  F: [phone] [x] [address]  P: [phone]  F: [phone] [] [address]  [facility]   [address]   [place]y Mcfaddin: [phone]  F: [phone]      Physical Therapy Daily Treatment Note    Date:  2020  Patient Name:  Marcelino Webster    :  1955  MRN: [id number]  Physician: Dr. Mikhail Lemon, DO                            Insurance: Medicare   Medical Diagnosis: R thigh pain, L PSIS pain                     Rehab Codes: M79.65, M51.86, M46.1  Onset date: ~10/11/20                       Next 's appt. :     Visit# / total visits: ; Progress note for Medicare patient due at visit 20     Cancels/No Shows: 0/0    Subjective:    Pain:  [x] Yes  [] No Location: R post thigh  Pain Rating: (0-10 scale) 4/10  Pain altered Tx:  [x] No  [] Yes  Action:  Comments: Pt reports pain remains around the same. Has a follow up with  on Monday. Pt does feel that therapy is helping, as pt states to feeling stronger since beginning therapy.      Objective:  Exercises:  Exercise  Low back pain Reps/ Time Weight/ Level Comments    NUSTEP  5' L5   x   SB Gastroc Str 2x30\"     x   Stool HS Str  2x30\"   x          TG squats 3x10 L17  x    4 way hip 2x10 Active    x          *Clamshells 3x10  orange    x   *Bridges+TA 2x10, 3\" hold     x   Supine march +TA  2x10   x   BKFO +TA 15x   x   *LTR 2x10    x   *Supine piriformis stretch 3x30\"     x    sidelying hip abd 2x10   Slight AAROM required for LLE -                     Other: *Distributed as HEP      Manual: Hypervolt to L piriformis, lumbar paraspinals      Integrative Dry Needling: L L3/L4, L4/L5 paravertebrals, L pirifromis Homeostatic point: Multiple L sided inferior cluneal points    Treatment Charges: Mins Units   []  Modalities     [x]  Ther Exercise 45 3   [x]  Manual Therapy 15 1   []  Ther Activities     []  Aquatics     []  Vasocompression     []  Other: Total Treatment time 60 4       Assessment: [x] Progressing toward goals. Pt with decrased low back pain post treatment this date. Pt has follow up with MD on Monday, and will come for one visit next week to determine plan going forward after seeing MD. Pt in agreement. [] Other:  [x] Patient would continue to benefit from skilled physical therapy services in order to decrease low back and piriformis tightness, increase LLE hip abd strength in order to decrease low back pain. STG/LTG  STG: (to be met in 10 treatments) updated on 12/17  1. ? Pain: Decrease pain levels to 4/10 at worst- MET  2. ? ROM: Increase flexibility and AROM limitations throughout to equal bilat to reduce difficulty with ADLs  3. ? Strength: Increase L hip abd strength to 3+/5- MET, currently at 3+/5  4. ? Function: Pt to report ambulating at home with decreased low back pain- MET  5. Independent with Home Exercise Programs- MET        LTG: (to be met in 20 treatments)  1. Improve score on assessment tool from 54% impaired to 25% impaired, demonstrating an improvement in ADLs- Progressing  2. Reduce pain levels to 0/10- Progressing                    Patient goals: To \"walk normal\" and decrease back pain    Pt. Education:  [x] Yes  [] No  [] Reviewed Prior HEP/Ed  Method of Education: [x] Verbal  [x] Demo  [] Written  Comprehension of Education:  [x] Verbalizes understanding. [x] Demonstrates understanding. [] Needs review. [] Demonstrates/verbalizes HEP/Ed previously given. Plan: [x] Continue current frequency toward long and short term goals. Frequency:  2 x/week for 20 visits      Time In: 11:00am           Time Out: 5369    Electronically signed by:  Cricket Keene PT

## 2021-01-05 ENCOUNTER — HOSPITAL ENCOUNTER (OUTPATIENT)
Dept: PHYSICAL THERAPY | Facility: CLINIC | Age: 66
Setting detail: THERAPIES SERIES
Discharge: HOME | End: 2021-01-05
Payer: MEDICARE

## 2021-01-05 PROCEDURE — 97140 MANUAL THERAPY 1/> REGIONS: CPT

## 2021-01-05 PROCEDURE — 97110 THERAPEUTIC EXERCISES: CPT

## 2021-01-05 NOTE — FLOWSHEET NOTE
[] [facility]) - [facility]  [address]  [phone]  F: [phone] [] [address]  [place] [address]   [address]  P: [phone]  F: [phone] [] [address] &  Therapy  [address]  P: [phone]  F: [phone] [x] [address]  P: [phone]  F: [phone] [] [address]  [facility]   [address]   [place]: [phone]  F: [phone]      Physical Therapy Daily Treatment Note    Date:  2021  Patient Name:  Gabrielle Morris    :  1955  MRN: [id number]  Physician: Dr. Diane Morris,                             Insurance: Medicare   Medical Diagnosis: R thigh pain, L PSIS pain                     Rehab Codes: M79.65, M51.86, M46.1  Onset date: ~10/11/20                       Next 's appt. :     Visit# / total visits: 15/20; Progress note for Medicare patient due at visit 20     Cancels/No Shows: 0/0    Subjective:    Pain:  [x] Yes  [] No Location: R sided low back Pain Rating: (0-10 scale) 2/10  Pain altered Tx:  [x] No  [] Yes  Action:  Comments: Pt arrives after seeing  yesterday. Pt was states was given muscle relaxors which has greatly decreased the pain. Was instructed to continue with physical therapy as well.      Objective:  Exercises:  Exercise  Low back pain Reps/ Time Weight/ Level Comments    NUSTEP  5' L5   x   SB Gastroc Str 2x30\"     x   Stool HS Str  2x30\"   x          TG squats 3x10 L17  x    4 way hip 15x Orange     x   Balance Board 2' L2            *Clamshells 3x10  orange    x   *Bridges+TA 2x10, 3\" hold     x   Supine march +TA  2x10      BKFO +TA 15x      *LTR 2x10    x   *Supine piriformis stretch 3x30\"     x    sidelying hip abd 2x10   Slight AAROM required for LLE -           Other: *Distributed as HEP      Manual: Hypervolt to L piriformis, lumbar paraspinals      Integrative Dry Needling: L L3/L4, L4/L5 paravertebrals, L pirifromis Homeostatic point: Multiple L sided inferior cluneal points    Treatment Charges: Mins Units   []  Modalities     [x]  Ther Exercise 45 3   [x]  Manual Therapy 15 1   []  Ther Activities     []  Aquatics     []  Vasocompression     []  Other: Total Treatment time 60 4       Assessment: [x] Progressing toward goals. Pt arriving with significantly improved back pain, therefore able to progress strengthenign via adding tband for 4way hip. Pt noting minor soreness in R hip during, however subsided after exercise was complete. Pt voiced no increased in soreness post exercises. Continued with Dry Needling and manual as listed above. [] Other:  [x] Patient would continue to benefit from skilled physical therapy services in order to decrease low back and piriformis tightness, increase LLE hip abd strength in order to decrease low back pain. STG/LTG  STG: (to be met in 10 treatments) updated on 12/17  1. ? Pain: Decrease pain levels to 4/10 at worst- MET  2. ? ROM: Increase flexibility and AROM limitations throughout to equal bilat to reduce difficulty with ADLs  3. ? Strength: Increase L hip abd strength to 3+/5- MET, currently at 3+/5  4. ? Function: Pt to report ambulating at home with decreased low back pain- MET  5. Independent with Home Exercise Programs- MET        LTG: (to be met in 20 treatments)  1. Improve score on assessment tool from 54% impaired to 25% impaired, demonstrating an improvement in ADLs- Progressing  2. Reduce pain levels to 0/10- Progressing                    Patient goals: To \"walk normal\" and decrease back pain    Pt. Education:  [x] Yes  [] No  [] Reviewed Prior HEP/Ed  Method of Education: [x] Verbal  [x] Demo  [] Written  Comprehension of Education:  [x] Verbalizes understanding. [x] Demonstrates understanding.   [] Needs review. [] Demonstrates/verbalizes HEP/Ed previously given. Plan: [x] Continue current frequency toward long and short term goals.         Frequency:  2 x/week for 20 visits      Time In: 11:00am           Time Out: 1799    Electronically signed by:  Vadim Abreu PT

## 2021-01-05 NOTE — PRE-CERTIFICATION NOTE
Medicare Cap   [x] Physical Therapy  [] Speech Therapy  [] Occupational therapy  *PT and Speech caps combine      $2040 Cap limit < kx modifier needed        Patient Name: Constantine Links: 1955    Note:  This is an estimate of charges billed.      Date of [address] Name # units/ charge $$$ charge Daily Total Charge Ongoing Total $$$   11/12/20 PT eval  Therex  Manual 1+1+1 82.82+23.22+21.70  127.74   11/16/20 2TE, 1Man (PTA)  2+1 25.26+19.74+18.45 63.45 191.19   11/19 2 manual  1 therex 2+1 27.39+21.70+23.22 72.31 263.5   11/24 2 manual  1 therex 2+1 27.39+21.70+23.22 72.31 335.81   12/1 2 manual  1 therex 2+1 27.39+21.70+23.22 72.31 408.12   12/3 2 manual  1 therex 2+1 27.39+21.70+23.22 72.31 480.43   12/8 2 manual   1 therex 2+1 27.39+21.70+23.22 72.31 552.74   12/10 2 manual  1 therex 2+1 27.39+21.70+23.22 72.31 625.05   12/15 2 manual  1 therex 2+1 27.39+21.70+23.22 72.31    12/17 2 manual  1 therex 2+1 27.39+21.70+23.22 72.31 769.67   12/22 2 manual  2 therex 2+2 29.72+23.22+21.70+21.70 96.34 866.01   12/24 3 Therex 3 29.72+23.22+23.22 76.16 942.17   12/31 3 therex (PT)  1 manual   29.72+23.22+23.22+21.70 97.86 1040.03           1/5/21 3 Therex (PT)  1 manual 3+1 29.72+23.22+23.22+21.70 97.86 97.86

## 2021-01-07 ENCOUNTER — HOSPITAL ENCOUNTER (OUTPATIENT)
Dept: PHYSICAL THERAPY | Facility: CLINIC | Age: 66
Setting detail: THERAPIES SERIES
Discharge: HOME | End: 2021-01-07
Payer: MEDICARE

## 2021-01-07 PROCEDURE — 97140 MANUAL THERAPY 1/> REGIONS: CPT

## 2021-01-07 PROCEDURE — 97110 THERAPEUTIC EXERCISES: CPT

## 2021-01-07 NOTE — FLOWSHEET NOTE
[] [facility]) - [facility] &  Therapy  [address]  [phone]  F: [phone] [] [address]  [place]   [address]  P: [phone]  F: [phone] [] [address]  Therapy  [address] [facility]  P: [phone]  F: [phone] [x] [address]  P: [phone]  F: [phone] [] [address]  [facility]   [address]   [place]: [phone]  F: [phone]      Physical Therapy Daily Treatment Note    Date:  2021  Patient Name:  Dina Amaro    :  1955  MRN: [id number]  Physician: Dr. Seth Ludwig,                             Insurance: Medicare   Medical Diagnosis: R thigh pain, L PSIS pain                     Rehab Codes: M79.65, M51.86, M46.1  Onset date: ~10/11/20                       Next 's appt.: N/A  Visit# / total visits: ; Progress note for Medicare patient due at visit 20     Cancels/No Shows: 0/0    Subjective:    Pain:  [x] Yes  [] No Location: R sided low back Pain Rating: (0-10 scale) 3-4/10- without muscle relaxer   Pain altered Tx:  [x] No  [] Yes  Action: N/A  Comments: Reports expected muscle soreness post last visit, which only remained for x4 hours. Reports relief with use of self-massager and tennis ball at home. Requests to perform DN this date. Reports continued difficulty with SLR flexion at home secondary to fatigue.      Objective:  Exercises:  Exercise  Low back pain Reps/ Time Weight/ Level Comments    NUSTEP x6' L7 Reports fatigue  x   SB Gastroc Str 3x30\" ea MOD height   x   Stool HS Str- B 2x30\" ea   x          TG squats 3x10 L17 Cues for eccentric control  x   4 way hip x20 ea YTB B UE A upon // bars- chest height  x   Balance Board 2' L2     Resisted T.A. anti-rotation walk-outs- B x6 ea Green/light  Sport cord  Cues for partial squat form  x          *Clamshells x20 ea [employer] challenge R LE x   *Bridges+TA 2x10, 3\" hold   With YTB loop for hip ABD  Reports relief x   Supine march +TA  2x10      BKFO +TA 15x      *LTR x10       *Supine piriformis stretch 3x30\"       Sidelying hip abd 2x10   Slight AAROM required for LLE -                     Other: *Distributed as HEP      Manual: Hypervolt to L piriformis, B lumbar paraspinals- LOW, ball attachment- for x10' total.     Integrative Dry Needling: L L3/L4, L4/L5 paravertebrals, L pirifromis Homeostatic point: Multiple L sided inferior cluneal points- completed by Jaden Brennan DPT. Treatment Charges: Mins Units   []  Modalities     [x]  Ther Exercise 36 2   [x]  Manual Therapy 20 1   []  Ther Activities     []  Aquatics     []  Vasocompression     []  Other: Total Treatment time 56 3     [x6' on nu-step]    Assessment: [x] Progressing toward goals. Pt presents with low-level pain, and reports expected muscle soreness post last visit. Therefore, progressed interventions per grid above to include performing against inc resistance- reports relief and good challenge. Also able to begin supine bridges with TB for hip ABD and resisted anti-rotation walk-outs without inc pain. Continued DN this date for dec tissue tension. Issued YTB for use at home. Consider ideas below next visit. [] Other:  [x] Patient would continue to benefit from skilled physical therapy services in order to decrease low back and piriformis tightness, increase LLE hip abd strength in order to decrease low back pain. STG: (to be met in 10 treatments) updated on 12/17  1. ? Pain: Decrease pain levels to 4/10 at worst- MET  2. ? ROM: Increase flexibility and AROM limitations throughout to equal bilat to reduce difficulty with ADLs  3. ? Strength:  Increase L hip abd strength to 3+/5- MET, currently at 3+/5  4. ? Function: Pt to report ambulating at home with decreased low back pain- MET  5. Independent with Home Exercise Programs- MET     LTG: (to be met in 20 treatments)  1. Improve score on assessment tool from 54% impaired to 25% impaired, demonstrating an improvement in ADLs- Progressing  2. Reduce pain levels to 0/10- Progressing                  Patient goals: To \"walk normal\" and decrease back pain    Pt. Education:  [] Yes  [] No  [] Reviewed Prior HEP/Ed  Method of Education: [] Verbal  [] Demo  [] Written  Comprehension of Education:  [] Verbalizes understanding. [] Demonstrates understanding. [] Needs review. [] Demonstrates/verbalizes HEP/Ed previously given. Plan: [x] Continue current frequency toward long and short term goals.       Frequency: 2 x/week for 20 visits      Time In: 10:03AM         Time Out: 11:05AM    Electronically signed by:  Fransisca Steve PT

## 2021-01-07 NOTE — PRE-CERTIFICATION NOTE
Medicare Cap   [x] Physical Therapy  [] Speech Therapy  [] Occupational therapy  *PT and Speech caps combine      $2040 Cap limit < kx modifier needed        Patient Name: Moses Curry: 1955    Note:  This is an estimate of charges billed.      Date of [address] Name # units/ charge $$$ charge Daily Total Charge Ongoing Total $$$   11/12/20 PT eval  Therex  Manual 1+1+1 82.82+23.22+21.70  127.74   11/16/20 2TE, 1Man (PTA)  2+1 25.26+19.74+18.45 63.45 191.19   11/19 2 manual  1 therex 2+1 27.39+21.70+23.22 72.31 263.5   11/24 2 manual  1 therex 2+1 27.39+21.70+23.22 72.31 335.81   12/1 2 manual  1 therex 2+1 27.39+21.70+23.22 72.31 408.12   12/3 2 manual  1 therex 2+1 27.39+21.70+23.22 72.31 480.43   12/8 2 manual   1 therex 2+1 27.39+21.70+23.22 72.31 552.74   12/10 2 manual  1 therex 2+1 27.39+21.70+23.22 72.31 625.05   12/15 2 manual  1 therex 2+1 27.39+21.70+23.22 72.31    12/17 2 manual  1 therex 2+1 27.39+21.70+23.22 72.31 769.67   12/22 2 manual  2 therex 2+2 29.72+23.22+21.70+21.70 96.34 866.01   12/24 3 Therex 3 29.72+23.22+23.22 76.16 942.17   12/31 3 therex (PT)  1 manual   29.72+23.22+23.22+21.70 97.86 1040.03           1/5/21 3 Therex (PT)  1 manual 3+1 29.72+23.22+23.22+21.70 97.86    1/7/2021 2 Therex  1 manual  2+1 29.72 + 23.22 + 21.70 74.64 1212.53

## 2021-01-12 ENCOUNTER — HOSPITAL ENCOUNTER (OUTPATIENT)
Dept: PHYSICAL THERAPY | Facility: CLINIC | Age: 66
Setting detail: THERAPIES SERIES
Discharge: HOME | End: 2021-01-12
Payer: MEDICARE

## 2021-01-12 PROCEDURE — 97140 MANUAL THERAPY 1/> REGIONS: CPT

## 2021-01-12 PROCEDURE — 97110 THERAPEUTIC EXERCISES: CPT

## 2021-01-12 NOTE — FLOWSHEET NOTE
[] [address]  [address]  [phone]  F: [phone] [] [address]  [facility]   [address]  P: [phone]  F: [phone] [] [address]  Therapy  [address] [facility]  P: [phone]  F: [phone] [x] [email or web address]  P: [phone]  F: [phone] [] [address]  [facility]   [address]   [place]: [phone]  F: [phone]      Physical Therapy Daily Treatment Note    Date:  2021  Patient Name:  Jensen Hidalgo    :  1955  MRN: [id number]  Physician: Dr. Dominic De La Garza,                             Insurance: Medicare   Medical Diagnosis: R thigh pain, L PSIS pain                     Rehab Codes: M79.65, M51.86, M46.1  Onset date: ~10/11/20                       Next 's appt.: N/A  Visit# / total visits: ; Progress note for Medicare patient due at visit 20     Cancels/No Shows: 0/0    Subjective:    Pain:  [x] Yes  [] No Location: R sided low back Pain Rating: (0-10 scale) 3/10  Pain altered Tx:  [x] No  [] Yes  Action: N/A  Comments: Pt arrives stating to having a really sore weekend, pt unsure of cause. Pt states pain subsided by Monday and is currently 3/10.      Objective:  Exercises:  Exercise  Low back pain Reps/ Time Weight/ Level Comments    NUSTEP x6' L7 Reports fatigue  x   SB Gastroc Str 3x30\" ea MOD height   x   Stool HS Str- B 2x30\" ea   x          TG squats 3x10 L17 Cues for eccentric control  x   4 way hip x20 ea active B UE A upon // bars- chest height  x   Balance Board 2' L2     Resisted T.A. anti-rotation walk-outs- B x6 ea Green/light  Sport cord  Cues for partial squat form - not today            *Clamshells x20 ea Active  Inc challenge R LE x   *Bridges+TA 2x10, 3\" hold   With YTB loop for hip ABD  Reports relief    Supine march +TA  2x10      BKFO +TA 15x      *LTR x10    x   *Supine piriformis stretch 3x30\"    x   Sidelying hip abd 2x10   Slight AAROM required for LLE -                     Other: *Distributed as HEP Bolded only     Manual: Hypervolt to L piriformis, B lumbar paraspinals- LOW, ball attachment- for x10' total.     Integrative Dry Needling: L L3/L4, L4/L5 paravertebrals, L pirifromis Homeostatic point: Multiple L sided inferior cluneal points    Treatment Charges: Mins Units   []  Modalities     [x]  Ther Exercise 30 2   [x]  Manual Therapy 25 2   []  Ther Activities     []  Aquatics     []  Vasocompression     []  Other: Total Treatment time 55 4     [x6' on nu-step]    Assessment: [x] Progressing toward goals. Decreased strengthenign this date via removing tband and performing all exercises actively. Pt reporting decreased tightness and pain post exercise this date. [] Other:  [x] Patient would continue to benefit from skilled physical therapy services in order to decrease low back and piriformis tightness, increase LLE hip abd strength in order to decrease low back pain. STG: (to be met in 10 treatments) updated on 12/17  1. ? Pain: Decrease pain levels to 4/10 at worst- MET  2. ? ROM: Increase flexibility and AROM limitations throughout to equal bilat to reduce difficulty with ADLs  3. ? Strength: Increase L hip abd strength to 3+/5- MET, currently at 3+/5  4. ? Function: Pt to report ambulating at home with decreased low back pain- MET  5. Independent with Home Exercise Programs- MET     LTG: (to be met in 20 treatments)  1. Improve score on assessment tool from 54% impaired to 25% impaired, demonstrating an improvement in ADLs- Progressing  2. Reduce pain levels to 0/10- Progressing                  Patient goals: To \"walk normal\" and decrease back pain    Pt.  Education:  [x] Yes  [] No  [] Reviewed Prior HEP/Ed  Method of Education: [x] Verbal

## 2021-01-12 NOTE — PRE-CERTIFICATION NOTE
Medicare Cap   [x] Physical Therapy  [] Speech Therapy  [] Occupational therapy  *PT and Speech caps combine      $2040 Cap limit < kx modifier needed        Patient Name: Wild Pill: 1955    Note:  This is an estimate of charges billed.      Date of [address] Name # units/ charge $$$ charge Daily Total Charge Ongoing Total $$$   11/12/20 PT eval  Therex  Manual 1+1+1 82.82+23.22+21.70  127.74   11/16/20 2TE, 1Man (PTA)  2+1 25.26+19.74+18.45 63.45 191.19   11/19 2 manual  1 therex 2+1 27.39+21.70+23.22 72.31 263.5   11/24 2 manual  1 therex 2+1 27.39+21.70+23.22 72.31 335.81   12/1 2 manual  1 therex 2+1 27.39+21.70+23.22 72.31 408.12   12/3 2 manual  1 therex 2+1 27.39+21.70+23.22 72.31 480.43   12/8 2 manual   1 therex 2+1 27.39+21.70+23.22 72.31 552.74   12/10 2 manual  1 therex 2+1 27.39+21.70+23.22 72.31 625.05   12/15 2 manual  1 therex 2+1 27.39+21.70+23.22 72.31    12/17 2 manual  1 therex 2+1 27.39+21.70+23.22 72.31 769.67   12/22 2 manual  2 therex 2+2 29.72+23.22+21.70+21.70 96.34 866.01   12/24 3 Therex 3 29.72+23.22+23.22 76.16 942.17   12/31 3 therex (PT)  1 manual   29.72+23.22+23.22+21.70 97.86 1040.03           1/5/21 3 Therex (PT)  1 manual 3+1 29.72+23.22+23.22+21.70 97.86    1/7/2021 2 Therex  1 manual  2+1 29.72 + 23.22 + 21.70 74.64 172.5   1/12/21 Therex  Manual 2+2 29.72 + 23.22 + 21.70+21.70 96.34 268.84

## 2021-01-14 ENCOUNTER — HOSPITAL ENCOUNTER (OUTPATIENT)
Dept: PHYSICAL THERAPY | Facility: CLINIC | Age: 66
Setting detail: THERAPIES SERIES
Discharge: HOME | End: 2021-01-14
Payer: MEDICARE

## 2021-01-14 PROCEDURE — 97110 THERAPEUTIC EXERCISES: CPT

## 2021-01-14 PROCEDURE — 97140 MANUAL THERAPY 1/> REGIONS: CPT

## 2021-01-14 NOTE — FLOWSHEET NOTE
[] [address]  [address]  [phone]  F: [phone] [] [address]  [place] [address]   [address]  P: [phone]  F: [phone] [] [address]  Therapy  [address] [facility]  P: [phone]  F: [phone] [x] [address]  P: [phone]  F: [phone] [] [address]  [facility]   [address]   [place]: [phone]  F: [phone]      Physical Therapy Daily Treatment Note    Date:  2021  Patient Name:  Pinkie Harada    :  1955  MRN: [id number]  Physician: Dr. Randall Jacobo, DO                            Insurance: Medicare   Medical Diagnosis: R thigh pain, L PSIS pain                     Rehab Codes: M79.65, M51.86, M46.1  Onset date: ~10/11/20                       Next 's appt.: N/A  Visit# / total visits: ; Progress note for Medicare patient due at visit 20     Cancels/No Shows: 0/0    Subjective:    Pain:  [x] Yes  [] No Location: R sided low back Pain Rating: (0-10 scale) 4/10  Pain altered Tx:  [x] No  [] Yes  Action: N/A  Comments: Pt arrives stating that pain was severe the past two days. Called  and received a new pain medication, which upon taking yesterday morning, made pt pass out and fall to the floor. Pt denies any injury from the fall or new pains. D/t pt's continued pain, pt in agreement to return to doctor and place PT on hold. Objective:  Exercises:  Exercise  Low back pain Reps/ Time Weight/ Level Comments    NUSTEP x6' L7 Reports fatigue  x   SB Gastroc Str 3x30\" ea MOD height   x   Stool HS Str- B 2x30\" ea   x          TG squats 3x10 L17 Cues for eccentric control  x   4 way hip x20 ea active B UE A upon // bars- chest height  x   Balance Board 2' L2     Resisted T. A. anti-rotation walk-outs- B x6 ea Green/light  Sport cord  Cues for partial squat form - not today            *Clamshells x20 ea Active  Inc challenge R LE x   *Bridges+TA 2x10, 3\" hold   With YTB loop for hip ABD  Reports relief    Supine march +TA  2x10      BKFO +TA 15x      *LTR x10    x   *Supine piriformis stretch 3x30\"    x   Sidelying hip abd 2x10   Slight AAROM required for LLE -                     Other: *Distributed as HEP Bolded only     Manual: Hypervolt to L piriformis, B lumbar paraspinals- LOW, ball attachment- for x10' total.     Integrative Dry Needling: L L3/L4, L4/L5 paravertebrals, L pirifromis Homeostatic point: Multiple L sided inferior cluneal points    Treatment Charges: Mins Units   []  Modalities     [x]  Ther Exercise 15 1   [x]  Manual Therapy 25 2   []  Ther Activities     []  Aquatics     []  Vasocompression     []  Other: Total Treatment time 40 3     [x6' on nu-step]    Assessment: [] Progressing toward goals. [x] Other: Pt with increased pain the last two weeks, advised pt to return to doctor for possible imaging/outside referral. Will place pt's chart on hold until follow up has occurred. [x] Patient would continue to benefit from skilled physical therapy services in order to decrease low back and piriformis tightness, increase LLE hip abd strength in order to decrease low back pain. STG: (to be met in 10 treatments) updated on 12/17  1. ? Pain: Decrease pain levels to 4/10 at worst- MET  2. ? ROM: Increase flexibility and AROM limitations throughout to equal bilat to reduce difficulty with ADLs  3. ? Strength: Increase L hip abd strength to 3+/5- MET, currently at 3+/5  4. ? Function: Pt to report ambulating at home with decreased low back pain- MET  5. Independent with Home Exercise Programs- MET     LTG: (to be met in 20 treatments)  1.  Improve score on assessment tool from 54% impaired to 25% impaired, demonstrating an improvement in ADLs- Progressing  2. Reduce pain levels to 0/10- Progressing                  Patient goals: To \"walk normal\" and decrease back pain    Pt. Education:  [x] Yes  [] No  [] Reviewed Prior HEP/Ed  Method of Education: [x] Verbal  [x] Demo  [] Written  Comprehension of Education:  [x] Verbalizes understanding. [x] Demonstrates understanding. [x] Needs review. [] Demonstrates/verbalizes HEP/Ed previously given. Plan: [x] Continue current frequency toward long and short term goals.       Frequency: 2 x/week for 20 visits      Time In: 1100AM         Time Out: 11:50AM    Electronically signed by:  Mo Russo, PT

## 2021-01-19 ENCOUNTER — HOSPITAL ENCOUNTER (OUTPATIENT)
Facility: CLINIC | Age: 66
Discharge: HOME | End: 2021-01-21
Payer: MEDICARE

## 2021-01-19 ENCOUNTER — HOSPITAL ENCOUNTER (OUTPATIENT)
Dept: GENERAL RADIOLOGY | Facility: CLINIC | Age: 66
Discharge: HOME | End: 2021-01-21
Payer: MEDICARE

## 2021-01-19 DIAGNOSIS — M54.50 LUMBAR PAIN: ICD-10-CM

## 2021-01-19 PROCEDURE — 72100 X-RAY EXAM L-S SPINE 2/3 VWS: CPT

## 2021-03-03 ENCOUNTER — HOSPITAL ENCOUNTER (OUTPATIENT)
Age: 66
Setting detail: SPECIMEN
Discharge: HOME | End: 2021-03-03
Payer: MEDICARE

## 2021-03-03 DIAGNOSIS — M79.7 FIBROMYALGIA: ICD-10-CM

## 2021-03-03 DIAGNOSIS — Z98.84 STATUS POST LAPAROSCOPIC SLEEVE GASTRECTOMY: ICD-10-CM

## 2021-03-03 DIAGNOSIS — M25.552 CHRONIC HIP PAIN, BILATERAL: ICD-10-CM

## 2021-03-03 DIAGNOSIS — F32.A DEPRESSION, UNSPECIFIED DEPRESSION TYPE: ICD-10-CM

## 2021-03-03 DIAGNOSIS — Z87.442 HISTORY OF KIDNEY STONES: ICD-10-CM

## 2021-03-03 DIAGNOSIS — G89.29 CHRONIC HIP PAIN, BILATERAL: ICD-10-CM

## 2021-03-03 DIAGNOSIS — E78.5 HYPERLIPIDEMIA, UNSPECIFIED HYPERLIPIDEMIA TYPE: ICD-10-CM

## 2021-03-03 DIAGNOSIS — E66.9 OBESITY (BMI 30-39.9): ICD-10-CM

## 2021-03-03 DIAGNOSIS — K21.9 GASTROESOPHAGEAL REFLUX DISEASE WITHOUT ESOPHAGITIS: ICD-10-CM

## 2021-03-03 DIAGNOSIS — M25.562 BILATERAL CHRONIC KNEE PAIN: ICD-10-CM

## 2021-03-03 DIAGNOSIS — G47.33 OSA (OBSTRUCTIVE SLEEP APNEA): ICD-10-CM

## 2021-03-03 DIAGNOSIS — Z86.711 HISTORY OF PULMONARY EMBOLISM: ICD-10-CM

## 2021-03-03 DIAGNOSIS — G89.29 BILATERAL CHRONIC KNEE PAIN: ICD-10-CM

## 2021-03-03 DIAGNOSIS — M25.551 CHRONIC HIP PAIN, BILATERAL: ICD-10-CM

## 2021-03-03 DIAGNOSIS — M25.561 BILATERAL CHRONIC KNEE PAIN: ICD-10-CM

## 2021-03-03 LAB
ABSOLUTE EOS #: 0.12 K/UL (ref 0–0.44)
ABSOLUTE IMMATURE GRANULOCYTE: <0.03 K/UL (ref 0–0.3)
ABSOLUTE LYMPH #: 2.07 K/UL (ref 1.1–3.7)
ABSOLUTE MONO #: 0.4 K/UL (ref 0.1–1.2)
ALBUMIN SERPL-MCNC: 4.3 G/DL (ref 3.5–5.2)
ALBUMIN/GLOBULIN RATIO: 1.8 (ref 1–2.5)
ALP BLD-CCNC: 91 U/L (ref 35–104)
ALT SERPL-CCNC: 61 U/L (ref 5–33)
ANION GAP SERPL CALCULATED.3IONS-SCNC: 11 MMOL/L (ref 9–17)
AST SERPL-CCNC: 33 U/L
BASOPHILS # BLD: 1 % (ref 0–2)
BASOPHILS ABSOLUTE: 0.07 K/UL (ref 0–0.2)
BILIRUB SERPL-MCNC: 0.6 MG/DL (ref 0.3–1.2)
BUN BLDV-MCNC: 18 MG/DL (ref 8–23)
BUN/CREAT BLD: ABNORMAL (ref 9–20)
CALCIUM SERPL-MCNC: 9.9 MG/DL (ref 8.6–10.4)
CHLORIDE BLD-SCNC: 105 MMOL/L (ref 98–107)
CHOLESTEROL/HDL RATIO: 2.5
CHOLESTEROL: 182 MG/DL
CO2: 27 MMOL/L (ref 20–31)
CREAT SERPL-MCNC: 0.88 MG/DL (ref 0.5–0.9)
DIFFERENTIAL TYPE: NORMAL
EOSINOPHILS RELATIVE PERCENT: 2 % (ref 1–4)
ESTIMATED AVERAGE GLUCOSE: 114 MG/DL
FERRITIN: 82 UG/L (ref 13–150)
FOLATE: 16.2 NG/ML
GFR AFRICAN AMERICAN: >60 ML/MIN
GFR NON-AFRICAN AMERICAN: >60 ML/MIN
GFR SERPL CREATININE-BSD FRML MDRD: ABNORMAL ML/MIN/{1.73_M2}
GFR SERPL CREATININE-BSD FRML MDRD: ABNORMAL ML/MIN/{1.73_M2}
GLUCOSE BLD-MCNC: 92 MG/DL (ref 70–99)
HBA1C MFR BLD: 5.6 % (ref 4–6)
HCT VFR BLD CALC: 44.9 % (ref 36.3–47.1)
HDLC SERPL-MCNC: 73 MG/DL
HEMOGLOBIN: 14.5 G/DL (ref 11.9–15.1)
IMMATURE GRANULOCYTES: 0 %
IRON SATURATION: 39 % (ref 20–55)
IRON: 105 UG/DL (ref 37–145)
LDL CHOLESTEROL: 96 MG/DL (ref 0–130)
LYMPHOCYTES # BLD: 34 % (ref 24–43)
MAGNESIUM: 2.5 MG/DL (ref 1.6–2.6)
MCH RBC QN AUTO: 30.1 PG (ref 25.2–33.5)
MCHC RBC AUTO-ENTMCNC: 32.3 G/DL (ref 28.4–34.8)
MCV RBC AUTO: 93.2 FL (ref 82.6–102.9)
MONOCYTES # BLD: 7 % (ref 3–12)
NRBC AUTOMATED: 0 PER 100 WBC
PDW BLD-RTO: 13.2 % (ref 11.8–14.4)
PLATELET # BLD: 205 K/UL (ref 138–453)
PLATELET ESTIMATE: NORMAL
PMV BLD AUTO: 10 FL (ref 8.1–13.5)
POTASSIUM SERPL-SCNC: 4 MMOL/L (ref 3.7–5.3)
PTH INTACT: 53.82 PG/ML (ref 15–65)
RBC # BLD: 4.82 M/UL (ref 3.95–5.11)
RBC # BLD: NORMAL 10*6/UL
SEG NEUTROPHILS: 56 % (ref 36–65)
SEGMENTED NEUTROPHILS ABSOLUTE COUNT: 3.43 K/UL (ref 1.5–8.1)
SODIUM BLD-SCNC: 143 MMOL/L (ref 135–144)
THYROXINE, FREE: 1.33 NG/DL (ref 0.93–1.7)
TOTAL IRON BINDING CAPACITY: 267 UG/DL (ref 250–450)
TOTAL PROTEIN: 6.7 G/DL (ref 6.4–8.3)
TRIGL SERPL-MCNC: 66 MG/DL
TSH SERPL DL<=0.05 MIU/L-ACNC: 3.32 MIU/L (ref 0.3–5)
UNSATURATED IRON BINDING CAPACITY: 162 UG/DL (ref 112–347)
VITAMIN B-12: 1402 PG/ML (ref 232–1245)
VITAMIN D 25-HYDROXY: 60 NG/ML (ref 30–100)
VLDLC SERPL CALC-MCNC: NORMAL MG/DL (ref 1–30)
WBC # BLD: 6.1 K/UL (ref 3.5–11.3)
WBC # BLD: NORMAL 10*3/UL

## 2021-03-06 LAB
RETINYL PALMITATE: 0.03 MG/L (ref 0–0.1)
VITAMIN A LEVEL: 0.58 MG/L (ref 0.3–1.2)
VITAMIN A, INTERP: NORMAL
ZINC: 88.6 UG/DL (ref 60–120)

## 2021-03-07 LAB — VITAMIN B1 WHOLE BLOOD: 170 NMOL/L (ref 70–180)

## 2021-03-09 ENCOUNTER — OFFICE VISIT (OUTPATIENT)
Dept: BARIATRICS/WEIGHT MGMT | Age: 66
End: 2021-03-09
Payer: MEDICARE

## 2021-03-09 VITALS
TEMPERATURE: 97.2 F | BODY MASS INDEX: 29.82 KG/M2 | DIASTOLIC BLOOD PRESSURE: 84 MMHG | SYSTOLIC BLOOD PRESSURE: 126 MMHG | RESPIRATION RATE: 18 BRPM | HEART RATE: 66 BPM | HEIGHT: 65 IN | WEIGHT: 179 LBS

## 2021-03-09 DIAGNOSIS — M79.7 FIBROMYALGIA: ICD-10-CM

## 2021-03-09 DIAGNOSIS — Z98.84 STATUS POST LAPAROSCOPIC SLEEVE GASTRECTOMY: ICD-10-CM

## 2021-03-09 DIAGNOSIS — F32.A DEPRESSION, UNSPECIFIED DEPRESSION TYPE: ICD-10-CM

## 2021-03-09 DIAGNOSIS — M25.562 BILATERAL CHRONIC KNEE PAIN: ICD-10-CM

## 2021-03-09 DIAGNOSIS — M25.552 CHRONIC HIP PAIN, BILATERAL: ICD-10-CM

## 2021-03-09 DIAGNOSIS — Z86.711 HISTORY OF PULMONARY EMBOLISM: ICD-10-CM

## 2021-03-09 DIAGNOSIS — R11.0 NAUSEA: ICD-10-CM

## 2021-03-09 DIAGNOSIS — G89.29 BILATERAL CHRONIC KNEE PAIN: ICD-10-CM

## 2021-03-09 DIAGNOSIS — M25.561 BILATERAL CHRONIC KNEE PAIN: ICD-10-CM

## 2021-03-09 DIAGNOSIS — G89.29 CHRONIC HIP PAIN, BILATERAL: ICD-10-CM

## 2021-03-09 DIAGNOSIS — M25.551 CHRONIC HIP PAIN, BILATERAL: ICD-10-CM

## 2021-03-09 DIAGNOSIS — E66.3 OVERWEIGHT (BMI 25.0-29.9): ICD-10-CM

## 2021-03-09 DIAGNOSIS — G47.33 OSA (OBSTRUCTIVE SLEEP APNEA): Primary | ICD-10-CM

## 2021-03-09 DIAGNOSIS — E78.5 HYPERLIPIDEMIA, UNSPECIFIED HYPERLIPIDEMIA TYPE: ICD-10-CM

## 2021-03-09 DIAGNOSIS — K21.9 GASTROESOPHAGEAL REFLUX DISEASE WITHOUT ESOPHAGITIS: ICD-10-CM

## 2021-03-09 PROBLEM — E66.9 OBESITY (BMI 30-39.9): Status: RESOLVED | Noted: 2019-11-22 | Resolved: 2021-03-09

## 2021-03-09 PROCEDURE — G8417 CALC BMI ABV UP PARAM F/U: HCPCS | Performed by: NURSE PRACTITIONER

## 2021-03-09 PROCEDURE — G8400 PT W/DXA NO RESULTS DOC: HCPCS | Performed by: NURSE PRACTITIONER

## 2021-03-09 PROCEDURE — 3017F COLORECTAL CA SCREEN DOC REV: CPT | Performed by: NURSE PRACTITIONER

## 2021-03-09 PROCEDURE — G8484 FLU IMMUNIZE NO ADMIN: HCPCS | Performed by: NURSE PRACTITIONER

## 2021-03-09 PROCEDURE — 1123F ACP DISCUSS/DSCN MKR DOCD: CPT | Performed by: NURSE PRACTITIONER

## 2021-03-09 PROCEDURE — 4040F PNEUMOC VAC/ADMIN/RCVD: CPT | Performed by: NURSE PRACTITIONER

## 2021-03-09 PROCEDURE — G8427 DOCREV CUR MEDS BY ELIG CLIN: HCPCS | Performed by: NURSE PRACTITIONER

## 2021-03-09 PROCEDURE — 99213 OFFICE O/P EST LOW 20 MIN: CPT | Performed by: NURSE PRACTITIONER

## 2021-03-09 PROCEDURE — 1036F TOBACCO NON-USER: CPT | Performed by: NURSE PRACTITIONER

## 2021-03-09 PROCEDURE — 1090F PRES/ABSN URINE INCON ASSESS: CPT | Performed by: NURSE PRACTITIONER

## 2021-03-09 RX ORDER — HYDROXYZINE HYDROCHLORIDE 25 MG/1
TABLET, FILM COATED ORAL
COMMUNITY
Start: 2021-02-13

## 2021-03-09 RX ORDER — UBIDECARENONE 100 MG
100 CAPSULE ORAL DAILY
COMMUNITY
Start: 2021-03-03

## 2021-03-09 RX ORDER — BRIMONIDINE TARTRATE 2 MG/ML
SOLUTION/ DROPS OPHTHALMIC
COMMUNITY
Start: 2020-04-01

## 2021-03-09 RX ORDER — HYDROCODONE BITARTRATE AND ACETAMINOPHEN 5; 325 MG/1; MG/1
TABLET ORAL
COMMUNITY
Start: 2020-06-09

## 2021-03-09 RX ORDER — BRIMONIDINE TARTRATE 2 MG/ML
SOLUTION/ DROPS OPHTHALMIC
COMMUNITY
Start: 2020-12-06

## 2021-03-09 RX ORDER — ONDANSETRON 4 MG/1
4 TABLET, FILM COATED ORAL EVERY 8 HOURS PRN
Qty: 30 TABLET | Refills: 2 | Status: SHIPPED | OUTPATIENT
Start: 2021-03-09

## 2021-03-10 NOTE — PROGRESS NOTES
Post-op Bariatric Surgery Note    Subjective     Patient is 7 months s/p laparoscopic sleeve gastrectomy and hiatal hernia repair, down 48 lbs. Overall, doing well. Incisions well healed. Consistent use of MVI and calcium. Physical activity includes walking. Some occasional nausea. Off verapamil. Scheduled to see OB/GYN tomorrow to evaluate pelvic pain. Allergies: Allergies   Allergen Reactions    Percocet [Oxycodone-Acetaminophen] Anaphylaxis     Can't breathe    Sulfa Antibiotics Anaphylaxis     Can't breathe    Linzess [Linaclotide] Other (See Comments)     seizure    Tramadol Other (See Comments)     seizure       Past Medical History:     Past Medical History:   Diagnosis Date    Abdominal pain     Arthritis     Celiac artery compression syndrome (HCC)     Depression     Fibromyalgia     GERD (gastroesophageal reflux disease)     Hx of blood clots     PE after back surgery    Hyperlipidemia     Kidney stones      passed spontaneously    Migraines     Sleep apnea     c-pap instructed to bring Dr. Giovanni Zabala DO to see on 20 for clearance   .     Past Surgical History:  Past Surgical History:   Procedure Laterality Date    BLADDER SUSPENSION       SECTION      COLONOSCOPY      CYSTOSCOPY Right 5/12/15    with laser litho and rt. stent insertion    JOINT REPLACEMENT      LASIK Bilateral     LUMBAR FUSION      OTHER SURGICAL HISTORY      surgery for celiac artery compression syndrome    SHOULDER ARTHROPLASTY Right     SLEEVE GASTRECTOMY  2020    laparoscopic, robotic, hiatal hernia repair, EGD    SLEEVE GASTRECTOMY N/A 2020    XI LAPAROSCOPIC ROBOTIC GASTRECTOMY SLEEVE,  hiatal hernia repair, EGD performed by Isac Marcelino DO at [address] [place] [address] ENDOSCOPY      UPPER GASTROINTESTINAL ENDOSCOPY N/A 2019    EGD BIOPSY performed by Jem Khan Carolyn Waller MD at [facility]    WRIST ARTHROPLASTY Left        Family History:  Family History   Problem Relation Age of Onset    Hypertension Father     Stroke Father     Alzheimer's Disease Mother     Coronary Art Dis Mother     Heart Attack Mother     Heart Disease Brother     No Known Problems Maternal Grandmother     Cancer Maternal Grandfather         lung    No Known Problems Paternal Grandmother     No Known Problems Paternal Grandfather        Social History:  Social History     Socioeconomic History    Marital status:      Spouse name: Not on file    Number of children: Not on file    Years of education: Not on file    Highest education level: Not on file   Occupational History    Not on file   Social Needs    Financial resource strain: Not on file    Food insecurity     Worry: Not on file     Inability: Not on file    Transportation needs     Medical: Not on file     Non-medical: Not on file   Tobacco Use    Smoking status: Former Smoker     Packs/day: 0.25     Years: 10.00     Pack years: 2.50     Types: Cigarettes     Quit date: 10/22/1978     Years since quittin.4    Smokeless tobacco: Never Used   Substance and Sexual Activity    Alcohol use: No    Drug use: Yes     Types: Marijuana     Comment: medical Surjit Bach; no longer uses    Sexual activity: Not on file   Lifestyle    Physical activity     Days per week: Not on file     Minutes per session: Not on file    Stress: Not on file   Relationships    Social connections     Talks on phone: Not on file     Gets together: Not on file     Attends Hinduism service: Not on file     Active member of club or organization: Not on file     Attends meetings of clubs or organizations: Not on file     Relationship status: Not on file    Intimate partner violence     Fear of current or ex partner: Not on file     Emotionally abused: Not on file     Physically abused: Not on file     Forced sexual activity: Not on file Other Topics Concern    Not on file   Social History Narrative    Not on file       Current Medications:  Current Outpatient Medications   Medication Sig Dispense Refill    brimonidine (ALPHAGAN) 0.2 % ophthalmic solution brimonidine 0.2 % eye drops      coenzyme Q10 100 MG CAPS capsule Take 100 mg by mouth daily      HYDROcodone-acetaminophen (NORCO) 5-325 MG per tablet hydrocodone 5 mg-acetaminophen 325 mg tablet      OnabotulinumtoxinA, Cosmetic, 100 units SOLR Inject 156 Units into the muscle once      ondansetron (ZOFRAN) 4 MG tablet Take 1 tablet by mouth every 8 hours as needed for Nausea or Vomiting 30 tablet 2    RIBOFLAVIN PO Take 400 mg by mouth daily      Calcium Carbonate Antacid (TUMS PO) Take by mouth      Multiple Vitamins-Minerals (CELEBRATE MULTI-COMPLETE 36) CHEW Take by mouth      MAGNESIUM MALATE PO Take 1,000 mg by mouth daily 3750mg      VITAMIN D PO Take 25 mcg by mouth daily Will bring dose day of surgery      pravastatin (PRAVACHOL) 20 MG tablet Take 1 tablet by mouth daily  3    melatonin 3 MG TABS tablet Take 10 mg by mouth nightly as needed       pantoprazole (PROTONIX) 40 MG tablet Take 40 mg by mouth      APAP-Isometheptene-Dichloral (MIDRIN PO) Take 1 tablet by mouth as needed 65mg      brimonidine (ALPHAGAN) 0.2 % ophthalmic solution INSTILL 1 DROP IN BOTH EYES TWICE DAILY      hydrOXYzine (ATARAX) 25 MG tablet TAKE 1 TABLET BY MOUTH THREE TIMES DAILY AS NEEDED       No current facility-administered medications for this visit. Vital Signs:  /84 (Site: Right Upper Arm, Position: Sitting, Cuff Size: Large Adult)   Pulse 66   Temp 97.2 °F (36.2 °C) (Infrared)   Resp 18   Ht 5' 5\" (1.651 m)   Wt 179 lb (81.2 kg)   LMP 10/22/2007 (Within Months)   BMI 29.79 kg/m²     BMI/Height/Weight:  Body mass index is 29.79 kg/m². Review of Systems - A review of systems was performed. All was negative unless otherwise documented in HPI.     Constitutional: Negative for fever, chills and diaphoresis. HENT: Negative for hearing loss and trouble swallowing. Eyes: Negative for photophobia and visual disturbance. Respiratory: Negative for cough, shortness of breath and wheezing. Cardiovascular: Negative for chest pain and palpitations. Gastrointestinal: Negative for vomiting, abdominal pain, diarrhea, constipation, blood in stool and abdominal distention. Positive for nausea. Endocrine: Negative for polydipsia, polyphagia and polyuria. Genitourinary: Negative for dysuria, frequency, hematuria and difficulty urinating. Musculoskeletal: Negative for myalgias, joint swelling. Skin: Negative for pallor and rash. Neurological: Negative for dizziness, tremors, light-headedness and headaches. Psychiatric/Behavioral: Negative for sleep disturbance and dysphoric mood. Objective:      Physical Exam   Vital signs reviewed. General: Well-developed and well-nourished. No acute distress. Skin: Warm, dry and intact. HEENT: Normocephalic. EOMs intact. Conjunctivae normal. Neck supple. Cardiovascular: Normal rate, regular rhythm. Pulmonary/Chest: Normal effort. Lungs clear to auscultation. No rales, rhonchi or wheezing. Abdominal: Positive bowel sounds. Soft, nontender. Nondistended. No rigidity, rebound, or guarding. Musculoskeletal: Movement x4. No edema. Neurological: Gait normal. Alert and oriented to person, place, and time. Psychiatric: Normal mood and affect. Speech and behavior normal. Judgment and thought content normal. Cognition and memory intact. Assessment:       Diagnosis Orders   1. SAMEERA (obstructive sleep apnea)  Comprehensive Metabolic Panel    TSH without Reflex    T4, Free    Hemoglobin A1C    Vitamin B12 & Folate    Vitamin B1    Vitamin D 25 Hydroxy    Magnesium    Iron and TIBC    Vitamin A    Lipid Panel    PTH, Intact    CBC Auto Differential    Zinc    Ferritin   2.  Nausea  ondansetron (ZOFRAN) 4 MG tablet Comprehensive Metabolic Panel    TSH without Reflex    T4, Free    Hemoglobin A1C    Vitamin B12 & Folate    Vitamin B1    Vitamin D 25 Hydroxy    Magnesium    Iron and TIBC    Vitamin A    Lipid Panel    PTH, Intact    CBC Auto Differential    Zinc    Ferritin   3. Hyperlipidemia, unspecified hyperlipidemia type  Comprehensive Metabolic Panel    TSH without Reflex    T4, Free    Hemoglobin A1C    Vitamin B12 & Folate    Vitamin B1    Vitamin D 25 Hydroxy    Magnesium    Iron and TIBC    Vitamin A    Lipid Panel    PTH, Intact    CBC Auto Differential    Zinc    Ferritin   4. Gastroesophageal reflux disease without esophagitis  Comprehensive Metabolic Panel    TSH without Reflex    T4, Free    Hemoglobin A1C    Vitamin B12 & Folate    Vitamin B1    Vitamin D 25 Hydroxy    Magnesium    Iron and TIBC    Vitamin A    Lipid Panel    PTH, Intact    CBC Auto Differential    Zinc    Ferritin   5. Depression, unspecified depression type  Comprehensive Metabolic Panel    TSH without Reflex    T4, Free    Hemoglobin A1C    Vitamin B12 & Folate    Vitamin B1    Vitamin D 25 Hydroxy    Magnesium    Iron and TIBC    Vitamin A    Lipid Panel    PTH, Intact    CBC Auto Differential    Zinc    Ferritin   6. Chronic migraine  Comprehensive Metabolic Panel    TSH without Reflex    T4, Free    Hemoglobin A1C    Vitamin B12 & Folate    Vitamin B1    Vitamin D 25 Hydroxy    Magnesium    Iron and TIBC    Vitamin A    Lipid Panel    PTH, Intact    CBC Auto Differential    Zinc    Ferritin   7. Fibromyalgia  Comprehensive Metabolic Panel    TSH without Reflex    T4, Free    Hemoglobin A1C    Vitamin B12 & Folate    Vitamin B1    Vitamin D 25 Hydroxy    Magnesium    Iron and TIBC    Vitamin A    Lipid Panel    PTH, Intact    CBC Auto Differential    Zinc    Ferritin   8.  Bilateral chronic knee pain  Comprehensive Metabolic Panel    TSH without Reflex    T4, Free    Hemoglobin A1C    Vitamin B12 & Folate    Vitamin B1    Vitamin D 25 Hydroxy Magnesium    Iron and TIBC    Vitamin A    Lipid Panel    PTH, Intact    CBC Auto Differential    Zinc    Ferritin   9. Chronic hip pain, bilateral  Comprehensive Metabolic Panel    TSH without Reflex    T4, Free    Hemoglobin A1C    Vitamin B12 & Folate    Vitamin B1    Vitamin D 25 Hydroxy    Magnesium    Iron and TIBC    Vitamin A    Lipid Panel    PTH, Intact    CBC Auto Differential    Zinc    Ferritin   10. History of pulmonary embolism  Comprehensive Metabolic Panel    TSH without Reflex    T4, Free    Hemoglobin A1C    Vitamin B12 & Folate    Vitamin B1    Vitamin D 25 Hydroxy    Magnesium    Iron and TIBC    Vitamin A    Lipid Panel    PTH, Intact    CBC Auto Differential    Zinc    Ferritin   11. Status post laparoscopic sleeve gastrectomy  Comprehensive Metabolic Panel    TSH without Reflex    T4, Free    Hemoglobin A1C    Vitamin B12 & Folate    Vitamin B1    Vitamin D 25 Hydroxy    Magnesium    Iron and TIBC    Vitamin A    Lipid Panel    PTH, Intact    CBC Auto Differential    Zinc    Ferritin   12. Overweight (BMI 25.0-29. 9)  Comprehensive Metabolic Panel    TSH without Reflex    T4, Free    Hemoglobin A1C    Vitamin B12 & Folate    Vitamin B1    Vitamin D 25 Hydroxy    Magnesium    Iron and TIBC    Vitamin A    Lipid Panel    PTH, Intact    CBC Auto Differential    Zinc    Ferritin       Plan:    Dietitian visit today. Patient to continue to increase protein to obtain 60-80g/day, at least 48-64oz of fluid daily, and gradually increase exercise regimen. Labs reviewed and discussed with patient. Liver enzymes mildly elevated. Zofran refilled for occasional nausea. Follow-up  Return in about 3 months (around 6/9/2021).     Orders this encounter:  Orders Placed This Encounter   Procedures    Comprehensive Metabolic Panel     Standing Status:   Future     Standing Expiration Date:   3/9/2022    TSH without Reflex     Standing Status:   Future     Standing Expiration Date:   3/9/2022   Perry Farrar

## 2021-06-03 ENCOUNTER — HOSPITAL ENCOUNTER (OUTPATIENT)
Age: 66
Setting detail: SPECIMEN
Discharge: HOME | End: 2021-06-03
Payer: MEDICARE

## 2021-06-03 DIAGNOSIS — G47.33 OSA (OBSTRUCTIVE SLEEP APNEA): ICD-10-CM

## 2021-06-03 DIAGNOSIS — M79.7 FIBROMYALGIA: ICD-10-CM

## 2021-06-03 DIAGNOSIS — R11.0 NAUSEA: ICD-10-CM

## 2021-06-03 DIAGNOSIS — Z98.84 STATUS POST LAPAROSCOPIC SLEEVE GASTRECTOMY: ICD-10-CM

## 2021-06-03 DIAGNOSIS — M25.552 CHRONIC HIP PAIN, BILATERAL: ICD-10-CM

## 2021-06-03 DIAGNOSIS — K21.9 GASTROESOPHAGEAL REFLUX DISEASE WITHOUT ESOPHAGITIS: ICD-10-CM

## 2021-06-03 DIAGNOSIS — G89.29 CHRONIC HIP PAIN, BILATERAL: ICD-10-CM

## 2021-06-03 DIAGNOSIS — M25.551 CHRONIC HIP PAIN, BILATERAL: ICD-10-CM

## 2021-06-03 DIAGNOSIS — G89.29 BILATERAL CHRONIC KNEE PAIN: ICD-10-CM

## 2021-06-03 DIAGNOSIS — M25.561 BILATERAL CHRONIC KNEE PAIN: ICD-10-CM

## 2021-06-03 DIAGNOSIS — E78.5 HYPERLIPIDEMIA, UNSPECIFIED HYPERLIPIDEMIA TYPE: ICD-10-CM

## 2021-06-03 DIAGNOSIS — Z86.711 HISTORY OF PULMONARY EMBOLISM: ICD-10-CM

## 2021-06-03 DIAGNOSIS — M25.562 BILATERAL CHRONIC KNEE PAIN: ICD-10-CM

## 2021-06-03 DIAGNOSIS — F32.A DEPRESSION, UNSPECIFIED DEPRESSION TYPE: ICD-10-CM

## 2021-06-03 DIAGNOSIS — E66.3 OVERWEIGHT (BMI 25.0-29.9): ICD-10-CM

## 2021-06-03 LAB
ABSOLUTE EOS #: 0.15 K/UL (ref 0–0.44)
ABSOLUTE IMMATURE GRANULOCYTE: <0.03 K/UL (ref 0–0.3)
ABSOLUTE LYMPH #: 1.4 K/UL (ref 1.1–3.7)
ABSOLUTE MONO #: 0.33 K/UL (ref 0.1–1.2)
ALBUMIN SERPL-MCNC: 4 G/DL (ref 3.5–5.2)
ALBUMIN/GLOBULIN RATIO: 1.7 (ref 1–2.5)
ALP BLD-CCNC: 74 U/L (ref 35–104)
ALT SERPL-CCNC: 28 U/L (ref 5–33)
ANION GAP SERPL CALCULATED.3IONS-SCNC: 10 MMOL/L (ref 9–17)
AST SERPL-CCNC: 35 U/L
BASOPHILS # BLD: 1 % (ref 0–2)
BASOPHILS ABSOLUTE: 0.05 K/UL (ref 0–0.2)
BILIRUB SERPL-MCNC: 0.64 MG/DL (ref 0.3–1.2)
BUN BLDV-MCNC: 12 MG/DL (ref 8–23)
BUN/CREAT BLD: ABNORMAL (ref 9–20)
CALCIUM SERPL-MCNC: 9.5 MG/DL (ref 8.6–10.4)
CHLORIDE BLD-SCNC: 108 MMOL/L (ref 98–107)
CHOLESTEROL/HDL RATIO: 3
CHOLESTEROL: 191 MG/DL
CO2: 27 MMOL/L (ref 20–31)
CREAT SERPL-MCNC: 0.74 MG/DL (ref 0.5–0.9)
DIFFERENTIAL TYPE: NORMAL
EOSINOPHILS RELATIVE PERCENT: 4 % (ref 1–4)
ESTIMATED AVERAGE GLUCOSE: 105 MG/DL
FERRITIN: 85 UG/L (ref 13–150)
FOLATE: 18.7 NG/ML
GFR AFRICAN AMERICAN: >60 ML/MIN
GFR NON-AFRICAN AMERICAN: >60 ML/MIN
GFR SERPL CREATININE-BSD FRML MDRD: ABNORMAL ML/MIN/{1.73_M2}
GFR SERPL CREATININE-BSD FRML MDRD: ABNORMAL ML/MIN/{1.73_M2}
GLUCOSE BLD-MCNC: 89 MG/DL (ref 70–99)
HBA1C MFR BLD: 5.3 % (ref 4–6)
HCT VFR BLD CALC: 40.6 % (ref 36.3–47.1)
HDLC SERPL-MCNC: 63 MG/DL
HEMOGLOBIN: 13.1 G/DL (ref 11.9–15.1)
IMMATURE GRANULOCYTES: 0 %
IRON SATURATION: 32 % (ref 20–55)
IRON: 87 UG/DL (ref 37–145)
LDL CHOLESTEROL: 111 MG/DL (ref 0–130)
LYMPHOCYTES # BLD: 33 % (ref 24–43)
MAGNESIUM: 2.3 MG/DL (ref 1.6–2.6)
MCH RBC QN AUTO: 30.6 PG (ref 25.2–33.5)
MCHC RBC AUTO-ENTMCNC: 32.3 G/DL (ref 28.4–34.8)
MCV RBC AUTO: 94.9 FL (ref 82.6–102.9)
MONOCYTES # BLD: 8 % (ref 3–12)
NRBC AUTOMATED: 0 PER 100 WBC
PDW BLD-RTO: 13.3 % (ref 11.8–14.4)
PLATELET # BLD: 195 K/UL (ref 138–453)
PLATELET ESTIMATE: NORMAL
PMV BLD AUTO: 9.9 FL (ref 8.1–13.5)
POTASSIUM SERPL-SCNC: 3.8 MMOL/L (ref 3.7–5.3)
PTH INTACT: 40.02 PG/ML (ref 15–65)
RBC # BLD: 4.28 M/UL (ref 3.95–5.11)
RBC # BLD: NORMAL 10*6/UL
SEG NEUTROPHILS: 54 % (ref 36–65)
SEGMENTED NEUTROPHILS ABSOLUTE COUNT: 2.3 K/UL (ref 1.5–8.1)
SODIUM BLD-SCNC: 145 MMOL/L (ref 135–144)
THYROXINE, FREE: 1.1 NG/DL (ref 0.93–1.7)
TOTAL IRON BINDING CAPACITY: 269 UG/DL (ref 250–450)
TOTAL PROTEIN: 6.3 G/DL (ref 6.4–8.3)
TRIGL SERPL-MCNC: 84 MG/DL
TSH SERPL DL<=0.05 MIU/L-ACNC: 4.25 MIU/L (ref 0.3–5)
UNSATURATED IRON BINDING CAPACITY: 182 UG/DL (ref 112–347)
VITAMIN B-12: 1421 PG/ML (ref 232–1245)
VITAMIN D 25-HYDROXY: 53.6 NG/ML (ref 30–100)
VLDLC SERPL CALC-MCNC: NORMAL MG/DL (ref 1–30)
WBC # BLD: 4.2 K/UL (ref 3.5–11.3)
WBC # BLD: NORMAL 10*3/UL

## 2021-06-06 LAB
RETINYL PALMITATE: 0.04 MG/L (ref 0–0.1)
VITAMIN A LEVEL: 0.46 MG/L (ref 0.3–1.2)
VITAMIN A, INTERP: NORMAL

## 2021-06-07 ENCOUNTER — OFFICE VISIT (OUTPATIENT)
Dept: BARIATRICS/WEIGHT MGMT | Age: 66
End: 2021-06-07
Payer: MEDICARE

## 2021-06-07 VITALS
WEIGHT: 169 LBS | BODY MASS INDEX: 28.16 KG/M2 | SYSTOLIC BLOOD PRESSURE: 102 MMHG | HEIGHT: 65 IN | DIASTOLIC BLOOD PRESSURE: 68 MMHG | HEART RATE: 62 BPM

## 2021-06-07 DIAGNOSIS — E66.3 OVERWEIGHT (BMI 25.0-29.9): ICD-10-CM

## 2021-06-07 DIAGNOSIS — M79.7 FIBROMYALGIA: ICD-10-CM

## 2021-06-07 DIAGNOSIS — M51.36 DDD (DEGENERATIVE DISC DISEASE), LUMBAR: ICD-10-CM

## 2021-06-07 DIAGNOSIS — E78.5 HYPERLIPIDEMIA, UNSPECIFIED HYPERLIPIDEMIA TYPE: Primary | ICD-10-CM

## 2021-06-07 DIAGNOSIS — Z98.84 STATUS POST LAPAROSCOPIC SLEEVE GASTRECTOMY: ICD-10-CM

## 2021-06-07 PROBLEM — M51.369 DDD (DEGENERATIVE DISC DISEASE), LUMBAR: Status: ACTIVE | Noted: 2021-06-07

## 2021-06-07 PROBLEM — M25.561 BILATERAL CHRONIC KNEE PAIN: Status: RESOLVED | Noted: 2019-10-23 | Resolved: 2021-06-07

## 2021-06-07 PROBLEM — G47.33 OSA (OBSTRUCTIVE SLEEP APNEA): Status: RESOLVED | Noted: 2019-10-23 | Resolved: 2021-06-07

## 2021-06-07 PROBLEM — K21.9 GASTROESOPHAGEAL REFLUX DISEASE WITHOUT ESOPHAGITIS: Status: RESOLVED | Noted: 2019-10-23 | Resolved: 2021-06-07

## 2021-06-07 PROBLEM — M25.551 CHRONIC HIP PAIN, BILATERAL: Status: RESOLVED | Noted: 2019-10-23 | Resolved: 2021-06-07

## 2021-06-07 PROBLEM — G89.29 BILATERAL CHRONIC KNEE PAIN: Status: RESOLVED | Noted: 2019-10-23 | Resolved: 2021-06-07

## 2021-06-07 PROBLEM — G89.29 CHRONIC HIP PAIN, BILATERAL: Status: RESOLVED | Noted: 2019-10-23 | Resolved: 2021-06-07

## 2021-06-07 PROBLEM — R73.03 PREDIABETES: Status: RESOLVED | Noted: 2019-10-23 | Resolved: 2021-06-07

## 2021-06-07 PROBLEM — M25.562 BILATERAL CHRONIC KNEE PAIN: Status: RESOLVED | Noted: 2019-10-23 | Resolved: 2021-06-07

## 2021-06-07 PROBLEM — M25.552 CHRONIC HIP PAIN, BILATERAL: Status: RESOLVED | Noted: 2019-10-23 | Resolved: 2021-06-07

## 2021-06-07 LAB
VITAMIN B1 WHOLE BLOOD: 164 NMOL/L (ref 70–180)
ZINC: 80.5 UG/DL (ref 60–120)

## 2021-06-07 PROCEDURE — 1123F ACP DISCUSS/DSCN MKR DOCD: CPT | Performed by: NURSE PRACTITIONER

## 2021-06-07 PROCEDURE — 3017F COLORECTAL CA SCREEN DOC REV: CPT | Performed by: NURSE PRACTITIONER

## 2021-06-07 PROCEDURE — G8400 PT W/DXA NO RESULTS DOC: HCPCS | Performed by: NURSE PRACTITIONER

## 2021-06-07 PROCEDURE — G8427 DOCREV CUR MEDS BY ELIG CLIN: HCPCS | Performed by: NURSE PRACTITIONER

## 2021-06-07 PROCEDURE — 99213 OFFICE O/P EST LOW 20 MIN: CPT | Performed by: NURSE PRACTITIONER

## 2021-06-07 PROCEDURE — 4040F PNEUMOC VAC/ADMIN/RCVD: CPT | Performed by: NURSE PRACTITIONER

## 2021-06-07 PROCEDURE — 1036F TOBACCO NON-USER: CPT | Performed by: NURSE PRACTITIONER

## 2021-06-07 PROCEDURE — 1090F PRES/ABSN URINE INCON ASSESS: CPT | Performed by: NURSE PRACTITIONER

## 2021-06-07 PROCEDURE — G8417 CALC BMI ABV UP PARAM F/U: HCPCS | Performed by: NURSE PRACTITIONER

## 2021-06-07 RX ORDER — LATANOPROST 50 UG/ML
SOLUTION/ DROPS OPHTHALMIC
COMMUNITY
Start: 2021-03-25

## 2021-06-07 NOTE — PROGRESS NOTES
Medical Nutrition Therapy  Metabolic and Bariatric surgery  9 months after surgery follow up note         Pt reports: No concerns; questions about eating enough        Vitals:   Vitals:    06/07/21 1117   BP: 102/68   Site: Right Upper Arm   Position: Sitting   Cuff Size: Large Adult   Pulse: 62   Weight: 169 lb (76.7 kg)   Height: 5' 5\" (1.651 m)      Body mass index is 28.12 kg/m². Labs reviewed:     Multivitamin/mineral intake:1 BA Complete Multiformula chewable lozenge daily  Calcium intake:   TUMS  Other:            Nutrition Assessment:   PES: Inadequate food and beverage intake r/t WLS as evidenced by loss of excess body weight lost 58 lbs over 10 mo. Goals   60-80gm of protein  48-64oz of fluid  Vitamin adherance  Basic adherance to WLS behavious and this document has been scanned into the chart.        [x] met     []  Not met        Plan:   F/u one year         Frederick Cristobal RD, LD

## 2021-06-07 NOTE — PROGRESS NOTES
performed by Sierra Astorga MD at [facility]    WRIST ARTHROPLASTY Left        Family History:  Family History   Problem Relation Age of Onset    Hypertension Father     Stroke Father     Alzheimer's Disease Mother     Coronary Art Dis Mother     Heart Attack Mother     Heart Disease Brother     No Known Problems Maternal Grandmother     Cancer Maternal Grandfather         lung    No Known Problems Paternal Grandmother     No Known Problems Paternal Grandfather        Social History:  Social History     Socioeconomic History    Marital status:      Spouse name: Not on file    Number of children: Not on file    Years of education: Not on file    Highest education level: Not on file   Occupational History    Not on file   Tobacco Use    Smoking status: Former Smoker     Packs/day: 0.25     Years: 10.00     Pack years: 2.50     Types: Cigarettes     Quit date: 10/22/1978     Years since quittin.6    Smokeless tobacco: Never Used   Vaping Use    Vaping Use: Former   Substance and Sexual Activity    Alcohol use: No    Drug use: Yes     Types: Marijuana     Comment: medical Conda Pyo; no longer uses    Sexual activity: Not on file   Other Topics Concern    Not on file   Social History Narrative    Not on file     Social Determinants of Health     Financial Resource Strain:     Difficulty of Paying Living Expenses:    Food Insecurity:     Worried About Running Out of Food in the Last Year:     [address] in the Last Year:    Transportation Needs:     Lack of Transportation (Medical):      Lack of Transportation (Non-Medical):    Physical Activity:     Days of Exercise per Week:     Minutes of Exercise per Session:    Stress:     Feeling of Stress :    Social Connections:     Frequency of Communication with Friends and Family:     Frequency of Social Gatherings with Friends and Family:     Attends [employer] Services:     Active Member of Clubs or Organizations:  Attends Club or Organization Meetings:     Marital Status:    Intimate Partner Violence:     Fear of Current or Ex-Partner:     Emotionally Abused:     Physically Abused:     Sexually Abused:        Current Medications:  Current Outpatient Medications   Medication Sig Dispense Refill    latanoprost (XALATAN) 0.005 % ophthalmic solution INSTILL 1 DROP INTO EACH EYE AT BEDTIME      brimonidine (ALPHAGAN) 0.2 % ophthalmic solution brimonidine 0.2 % eye drops      brimonidine (ALPHAGAN) 0.2 % ophthalmic solution INSTILL 1 DROP IN BOTH EYES TWICE DAILY      coenzyme Q10 100 MG CAPS capsule Take 100 mg by mouth daily      HYDROcodone-acetaminophen (NORCO) 5-325 MG per tablet hydrocodone 5 mg-acetaminophen 325 mg tablet      hydrOXYzine (ATARAX) 25 MG tablet TAKE 1 TABLET BY MOUTH THREE TIMES DAILY AS NEEDED      OnabotulinumtoxinA, Cosmetic, 100 units SOLR Inject 156 Units into the muscle once      ondansetron (ZOFRAN) 4 MG tablet Take 1 tablet by mouth every 8 hours as needed for Nausea or Vomiting 30 tablet 2    RIBOFLAVIN PO Take 400 mg by mouth daily      Calcium Carbonate Antacid (TUMS PO) Take by mouth      Multiple Vitamins-Minerals (CELEBRATE MULTI-COMPLETE 36) CHEW Take by mouth      MAGNESIUM MALATE PO Take 1,000 mg by mouth daily 3750mg      VITAMIN D PO Take 25 mcg by mouth daily Will bring dose day of surgery      pravastatin (PRAVACHOL) 20 MG tablet Take 1 tablet by mouth daily  3    melatonin 3 MG TABS tablet Take 10 mg by mouth nightly as needed       pantoprazole (PROTONIX) 40 MG tablet Take 40 mg by mouth      APAP-Isometheptene-Dichloral (MIDRIN PO) Take 1 tablet by mouth as needed 65mg       No current facility-administered medications for this visit.        Vital Signs:  /68 (Site: Right Upper Arm, Position: Sitting, Cuff Size: Large Adult)   Pulse 62   Ht 5' 5\" (1.651 m)   Wt 169 lb (76.7 kg)   LMP 10/22/2007 (Within Months)   BMI 28.12 kg/m² BMI/Height/Weight:  Body mass index is 28.12 kg/m². Review of Systems - A review of systems was performed. All was negative unless otherwise documented in HPI. Constitutional: Negative for fever, chills and diaphoresis. HENT: Negative for hearing loss and trouble swallowing. Eyes: Negative for photophobia and visual disturbance. Respiratory: Negative for cough, shortness of breath and wheezing. Cardiovascular: Negative for chest pain and palpitations. Gastrointestinal: Negative for vomiting, abdominal pain, diarrhea, constipation, blood in stool and abdominal distention. Positive for nausea. Endocrine: Negative for polydipsia, polyphagia and polyuria. Genitourinary: Negative for dysuria, frequency, hematuria and difficulty urinating. Musculoskeletal: Negative for myalgias, joint swelling. Skin: Negative for pallor and rash. Neurological: Negative for dizziness, tremors, light-headedness and headaches. Psychiatric/Behavioral: Negative for sleep disturbance and dysphoric mood. Objective:      Physical Exam   Vital signs reviewed. General: Well-developed and well-nourished. No acute distress. Skin: Warm, dry and intact. HEENT: Normocephalic. EOMs intact. Conjunctivae normal. Neck supple. Cardiovascular: Normal rate, regular rhythm. Pulmonary/Chest: Normal effort. Lungs clear to auscultation. No rales, rhonchi or wheezing. Abdominal: Positive bowel sounds. Soft, nontender. Nondistended. No rigidity, rebound, or guarding. Musculoskeletal: Movement x4. No edema. Neurological: Gait normal. Alert and oriented to person, place, and time. Psychiatric: Normal mood and affect. Speech and behavior normal. Judgment and thought content normal. Cognition and memory intact. Assessment:       Diagnosis Orders   1. Hyperlipidemia, unspecified hyperlipidemia type     2. Status post laparoscopic sleeve gastrectomy     3. Overweight (BMI 25.0-29.9)     4. Chronic migraine     5. Fibromyalgia     6. DDD (degenerative disc disease), lumbar         Plan:    Dietitian visit today. Patient to continue to increase protein to obtain 60-80g/day, at least 48-64oz of fluid daily, and gradually increase exercise regimen. Labs reviewed and discussed with patient. Acceptable. Vitamin B1 pending. Discussed the importance of staying hydrated to avoid dizziness. Feeling cold symptoms likely related to less body fat. If numbness of hands and feet persists, f/u with PCP. Follow-up  Return in about 2 months (around 8/7/2021). Orders this encounter:  No orders of the defined types were placed in this encounter. Prescriptions this encounter:  No orders of the defined types were placed in this encounter.       Electronically signed by:  Shirley Smiley CNP

## 2021-08-23 ENCOUNTER — OFFICE VISIT (OUTPATIENT)
Dept: BARIATRICS/WEIGHT MGMT | Age: 66
End: 2021-08-23
Payer: MEDICARE

## 2021-08-23 VITALS
RESPIRATION RATE: 20 BRPM | WEIGHT: 158 LBS | HEART RATE: 62 BPM | HEIGHT: 65 IN | DIASTOLIC BLOOD PRESSURE: 80 MMHG | BODY MASS INDEX: 26.33 KG/M2 | SYSTOLIC BLOOD PRESSURE: 122 MMHG

## 2021-08-23 DIAGNOSIS — E66.3 OVERWEIGHT (BMI 25.0-29.9): ICD-10-CM

## 2021-08-23 DIAGNOSIS — Z86.711 HISTORY OF PULMONARY EMBOLISM: ICD-10-CM

## 2021-08-23 DIAGNOSIS — E78.5 HYPERLIPIDEMIA, UNSPECIFIED HYPERLIPIDEMIA TYPE: Primary | ICD-10-CM

## 2021-08-23 DIAGNOSIS — Z98.84 STATUS POST LAPAROSCOPIC SLEEVE GASTRECTOMY: ICD-10-CM

## 2021-08-23 DIAGNOSIS — M79.7 FIBROMYALGIA: ICD-10-CM

## 2021-08-23 PROCEDURE — 1123F ACP DISCUSS/DSCN MKR DOCD: CPT | Performed by: NURSE PRACTITIONER

## 2021-08-23 PROCEDURE — G8400 PT W/DXA NO RESULTS DOC: HCPCS | Performed by: NURSE PRACTITIONER

## 2021-08-23 PROCEDURE — 4040F PNEUMOC VAC/ADMIN/RCVD: CPT | Performed by: NURSE PRACTITIONER

## 2021-08-23 PROCEDURE — 1090F PRES/ABSN URINE INCON ASSESS: CPT | Performed by: NURSE PRACTITIONER

## 2021-08-23 PROCEDURE — 99213 OFFICE O/P EST LOW 20 MIN: CPT | Performed by: NURSE PRACTITIONER

## 2021-08-23 PROCEDURE — G8417 CALC BMI ABV UP PARAM F/U: HCPCS | Performed by: NURSE PRACTITIONER

## 2021-08-23 PROCEDURE — G8427 DOCREV CUR MEDS BY ELIG CLIN: HCPCS | Performed by: NURSE PRACTITIONER

## 2021-08-23 PROCEDURE — 3017F COLORECTAL CA SCREEN DOC REV: CPT | Performed by: NURSE PRACTITIONER

## 2021-08-23 PROCEDURE — 1036F TOBACCO NON-USER: CPT | Performed by: NURSE PRACTITIONER

## 2021-08-23 NOTE — PROGRESS NOTES
Medical Nutrition Therapy  Metabolic and Bariatric surgery  Annual follow up note         Pt reports: No concerns         Vitals:   Vitals:    08/23/21 1123   BP: 122/80   Site: Right Upper Arm   Position: Sitting   Cuff Size: Medium Adult   Pulse: 62   Resp: 20   Weight: 158 lb (71.7 kg)   Height: 5' 5\" (1.651 m)      Body mass index is 26.29 kg/m². Labs reviewed:     Multivitamin/mineral intake:. BA Complete Multiformula chewable lozenge daily  Calcium intake:   TUMS  Other:            Nutrition Assessment:   PES: Inadequate food and beverage intake r/t WLS as evidenced by loss of excess body weight lost 69 lbs over 1 year. Goals   60-80gm of protein  48-64oz of fluid  Vitamin adherance  Basic adherance to WLS behavious and this document has been scanned into the chart.        [x] met     []  Not met        Plan:   F/u annually         Sherron Grissom RD, LD

## 2021-08-24 NOTE — PROGRESS NOTES
Post-op Bariatric Surgery Note    Subjective     Patient is 1 year s/p laparoscopic sleeve gastrectomy and hiatal hernia repair, down 69 lbs. Overall, doing well. Incisions well healed. Consistent use of MVI and calcium. Physical activity includes walking and yoga. No current issues. Allergies: Allergies   Allergen Reactions    Percocet [Oxycodone-Acetaminophen] Anaphylaxis     Can't breathe    Sulfa Antibiotics Anaphylaxis     Can't breathe    Linzess [Linaclotide] Other (See Comments)     seizure    Tramadol Other (See Comments)     seizure       Past Medical History:     Past Medical History:   Diagnosis Date    Abdominal pain     Arthritis     Celiac artery compression syndrome (HCC)     Depression     Fibromyalgia     GERD (gastroesophageal reflux disease)     Hx of blood clots     PE after back surgery    Hyperlipidemia     Kidney stones      passed spontaneously    Migraines     Sleep apnea     c-pap instructed to bring Dr. Adolfo Zabala DO to see on 20 for clearance   .     Past Surgical History:  Past Surgical History:   Procedure Laterality Date    BLADDER SUSPENSION       SECTION      COLONOSCOPY      CYSTOSCOPY Right 5/12/15    with laser litho and rt. stent insertion    JOINT REPLACEMENT      LASIK Bilateral     LUMBAR FUSION      OTHER SURGICAL HISTORY      surgery for celiac artery compression syndrome    SHOULDER ARTHROPLASTY Right     SLEEVE GASTRECTOMY  2020    laparoscopic, robotic, hiatal hernia repair, EGD    SLEEVE GASTRECTOMY N/A 2020    XI LAPAROSCOPIC ROBOTIC GASTRECTOMY SLEEVE,  hiatal hernia repair, EGD performed by Matt Bridges DO at [address] [place] [address] ENDOSCOPY      UPPER GASTROINTESTINAL ENDOSCOPY N/A 2019    EGD BIOPSY performed by Hanna Crowe MD at [facility]    WRIST ARTHROPLASTY Left        Family History:  Family History   Problem Relation Age of Onset    Hypertension Father     Stroke Father     Alzheimer's Disease Mother     Coronary Art Dis Mother     Heart Attack Mother     Heart Disease Brother     No Known Problems Maternal Grandmother     Cancer Maternal Grandfather         lung    No Known Problems Paternal Grandmother     No Known Problems Paternal Grandfather        Social History:  Social History     Socioeconomic History    Marital status:      Spouse name: Not on file    Number of children: Not on file    Years of education: Not on file    Highest education level: Not on file   Occupational History    Not on file   Tobacco Use    Smoking status: Former Smoker     Packs/day: 0.25     Years: 10.00     Pack years: 2.50     Types: Cigarettes     Quit date: 10/22/1978     Years since quittin.8    Smokeless tobacco: Never Used   Vaping Use    Vaping Use: Former   Substance and Sexual Activity    Alcohol use: No    Drug use: Yes     Types: Marijuana     Comment: medical Renee Donte; no longer uses    Sexual activity: Not on file   Other Topics Concern    Not on file   Social History Narrative    Not on file     Social Determinants of Health     Financial Resource Strain:     Difficulty of Paying Living Expenses:    Food Insecurity:     Worried About Running Out of Food in the Last Year:     [address] in the Last Year:    Transportation Needs:     Lack of Transportation (Medical):      Lack of Transportation (Non-Medical):    Physical Activity:     Days of Exercise per Week:     Minutes of Exercise per Session:    Stress:     Feeling of Stress :    Social Connections:     Frequency of Communication with Friends and Family:     Frequency of Social Gatherings with Friends and Family:     Attends Buddhism Services:     Active Member of Clubs or Organizations:     Attends Club or Organization Meetings:     Marital Status:    Intimate Partner Violence:  Fear of Current or Ex-Partner:     Emotionally Abused:     Physically Abused:     Sexually Abused:        Current Medications:  Current Outpatient Medications   Medication Sig Dispense Refill    latanoprost (XALATAN) 0.005 % ophthalmic solution INSTILL 1 DROP INTO EACH EYE AT BEDTIME      brimonidine (ALPHAGAN) 0.2 % ophthalmic solution brimonidine 0.2 % eye drops      brimonidine (ALPHAGAN) 0.2 % ophthalmic solution INSTILL 1 DROP IN BOTH EYES TWICE DAILY      coenzyme Q10 100 MG CAPS capsule Take 100 mg by mouth daily      HYDROcodone-acetaminophen (NORCO) 5-325 MG per tablet hydrocodone 5 mg-acetaminophen 325 mg tablet      hydrOXYzine (ATARAX) 25 MG tablet TAKE 1 TABLET BY MOUTH THREE TIMES DAILY AS NEEDED      OnabotulinumtoxinA, Cosmetic, 100 units SOLR Inject 156 Units into the muscle once      ondansetron (ZOFRAN) 4 MG tablet Take 1 tablet by mouth every 8 hours as needed for Nausea or Vomiting 30 tablet 2    RIBOFLAVIN PO Take 400 mg by mouth daily      Calcium Carbonate Antacid (TUMS PO) Take by mouth      Multiple Vitamins-Minerals (CELEBRATE MULTI-COMPLETE 36) CHEW Take by mouth      MAGNESIUM MALATE PO Take 1,000 mg by mouth daily 3750mg      VITAMIN D PO Take 25 mcg by mouth daily Will bring dose day of surgery      melatonin 3 MG TABS tablet Take 10 mg by mouth nightly as needed       pantoprazole (PROTONIX) 40 MG tablet Take 40 mg by mouth      APAP-Isometheptene-Dichloral (MIDRIN PO) Take 1 tablet by mouth as needed 65mg      pravastatin (PRAVACHOL) 20 MG tablet Take 1 tablet by mouth daily (Patient not taking: Reported on 8/23/2021)  3     No current facility-administered medications for this visit. Vital Signs:  /80 (Site: Right Upper Arm, Position: Sitting, Cuff Size: Medium Adult)   Pulse 62   Resp 20   Ht 5' 5\" (1.651 m)   Wt 158 lb (71.7 kg)   LMP 10/22/2007 (Within Months)   BMI 26.29 kg/m²     BMI/Height/Weight:  Body mass index is 26.29 kg/m². Vitamin B12 & Folate    Vitamin B1    Vitamin A    TSH without Reflex   2. Chronic migraine  CBC Auto Differential    Comprehensive Metabolic Panel    Hemoglobin A1C    Iron and TIBC    Lipid Panel    Magnesium    PTH, Intact    Zinc    Vitamin D 25 Hydroxy    Vitamin B12 & Folate    Vitamin B1    Vitamin A    TSH without Reflex   3. Fibromyalgia  CBC Auto Differential    Comprehensive Metabolic Panel    Hemoglobin A1C    Iron and TIBC    Lipid Panel    Magnesium    PTH, Intact    Zinc    Vitamin D 25 Hydroxy    Vitamin B12 & Folate    Vitamin B1    Vitamin A    TSH without Reflex   4. History of pulmonary embolism  CBC Auto Differential    Comprehensive Metabolic Panel    Hemoglobin A1C    Iron and TIBC    Lipid Panel    Magnesium    PTH, Intact    Zinc    Vitamin D 25 Hydroxy    Vitamin B12 & Folate    Vitamin B1    Vitamin A    TSH without Reflex   5. Status post laparoscopic sleeve gastrectomy  CBC Auto Differential    Comprehensive Metabolic Panel    Hemoglobin A1C    Iron and TIBC    Lipid Panel    Magnesium    PTH, Intact    Zinc    Vitamin D 25 Hydroxy    Vitamin B12 & Folate    Vitamin B1    Vitamin A    TSH without Reflex   6. Overweight (BMI 25.0-29. 9)  CBC Auto Differential    Comprehensive Metabolic Panel    Hemoglobin A1C    Iron and TIBC    Lipid Panel    Magnesium    PTH, Intact    Zinc    Vitamin D 25 Hydroxy    Vitamin B12 & Folate    Vitamin B1    Vitamin A    TSH without Reflex       Plan:    Dietitian visit today. Patient to continue to increase protein to obtain 60-80g/day, at least 48-64oz of fluid daily, and gradually increase exercise regimen. Labs from 6/3/21 reviewed and discussed with patient. Acceptable. Follow-up  Return in about 6 months (around 2/23/2022).     Orders this encounter:  Orders Placed This Encounter   Procedures    CBC Auto Differential     Standing Status:   Future     Standing Expiration Date:   8/23/2022    Comprehensive Metabolic Panel     Standing Status:   Future     Standing Expiration Date:   8/23/2022    Hemoglobin A1C     Standing Status:   Future     Standing Expiration Date:   8/23/2022    Iron and TIBC     Standing Status:   Future     Standing Expiration Date:   8/23/2022     Order Specific Question:   Is Patient Fasting? Answer:   yes     Order Specific Question:   No of Hours? Answer:   12    Lipid Panel     Standing Status:   Future     Standing Expiration Date:   8/23/2022     Order Specific Question:   Is Patient Fasting?/# of Hours     Answer:   12    Magnesium     Standing Status:   Future     Standing Expiration Date:   8/23/2022    PTH, Intact     Standing Status:   Future     Standing Expiration Date:   8/23/2022    Zinc     Standing Status:   Future     Standing Expiration Date:   8/23/2022    Vitamin D 25 Hydroxy     Standing Status:   Future     Standing Expiration Date:   8/23/2022    Vitamin B12 & Folate     Standing Status:   Future     Standing Expiration Date:   8/23/2022    Vitamin B1     Standing Status:   Future     Standing Expiration Date:   8/23/2022    Vitamin A     Standing Status:   Future     Standing Expiration Date:   8/23/2022    TSH without Reflex     Standing Status:   Future     Standing Expiration Date:   8/23/2022       Prescriptions this encounter:  No orders of the defined types were placed in this encounter.       Electronically signed by:  Galileo Watts CNP

## 2021-12-21 ENCOUNTER — HOSPITAL ENCOUNTER (OUTPATIENT)
Age: 66
Setting detail: SPECIMEN
Discharge: HOME | End: 2021-12-21

## 2021-12-23 LAB
CULTURE: NORMAL
Lab: NORMAL
SPECIMEN DESCRIPTION: NORMAL

## 2022-01-24 ENCOUNTER — HOSPITAL ENCOUNTER (OUTPATIENT)
Facility: CLINIC | Age: 67
Discharge: HOME | End: 2022-01-26
Payer: MEDICARE

## 2022-01-24 ENCOUNTER — HOSPITAL ENCOUNTER (OUTPATIENT)
Dept: GENERAL RADIOLOGY | Facility: CLINIC | Age: 67
Discharge: HOME | End: 2022-01-26
Payer: MEDICARE

## 2022-01-24 DIAGNOSIS — R10.9 ABDOMINAL PAIN, UNSPECIFIED ABDOMINAL LOCATION: ICD-10-CM

## 2022-01-24 PROCEDURE — 74019 RADEX ABDOMEN 2 VIEWS: CPT

## 2022-01-29 ENCOUNTER — APPOINTMENT (OUTPATIENT)
Dept: CT IMAGING | Age: 67
End: 2022-01-29
Payer: MEDICARE

## 2022-01-29 ENCOUNTER — HOSPITAL ENCOUNTER (EMERGENCY)
Age: 67
Discharge: HOME | End: 2022-01-29
Attending: EMERGENCY MEDICINE
Payer: MEDICARE

## 2022-01-29 VITALS
DIASTOLIC BLOOD PRESSURE: 62 MMHG | HEIGHT: 65 IN | OXYGEN SATURATION: 100 % | BODY MASS INDEX: 24.99 KG/M2 | RESPIRATION RATE: 16 BRPM | WEIGHT: 150 LBS | HEART RATE: 60 BPM | SYSTOLIC BLOOD PRESSURE: 92 MMHG | TEMPERATURE: 97.8 F

## 2022-01-29 DIAGNOSIS — K57.90 DIVERTICULOSIS: Primary | ICD-10-CM

## 2022-01-29 LAB
-: ABNORMAL
ABSOLUTE EOS #: 0.1 K/UL (ref 0–0.4)
ABSOLUTE IMMATURE GRANULOCYTE: NORMAL K/UL (ref 0–0.3)
ABSOLUTE LYMPH #: 1.7 K/UL (ref 1–4.8)
ABSOLUTE MONO #: 0.3 K/UL (ref 0.1–1.2)
ALBUMIN SERPL-MCNC: 4 G/DL (ref 3.5–5.2)
ALBUMIN/GLOBULIN RATIO: 1.7 (ref 1–2.5)
ALP BLD-CCNC: 84 U/L (ref 35–104)
ALT SERPL-CCNC: 24 U/L (ref 5–33)
AMORPHOUS: ABNORMAL
AMYLASE: 36 U/L (ref 28–100)
ANION GAP SERPL CALCULATED.3IONS-SCNC: 10 MMOL/L (ref 9–17)
AST SERPL-CCNC: 25 U/L
BACTERIA: ABNORMAL
BASOPHILS # BLD: 1 % (ref 0–2)
BASOPHILS ABSOLUTE: 0.1 K/UL (ref 0–0.2)
BILIRUB SERPL-MCNC: 0.46 MG/DL (ref 0.3–1.2)
BILIRUBIN DIRECT: 0.12 MG/DL
BILIRUBIN URINE: NEGATIVE
BILIRUBIN, INDIRECT: 0.34 MG/DL (ref 0–1)
BUN BLDV-MCNC: 10 MG/DL (ref 8–23)
BUN/CREAT BLD: ABNORMAL (ref 9–20)
CALCIUM SERPL-MCNC: 9.7 MG/DL (ref 8.6–10.4)
CASTS UA: ABNORMAL /LPF
CHLORIDE BLD-SCNC: 101 MMOL/L (ref 98–107)
CO2: 25 MMOL/L (ref 20–31)
COLOR: YELLOW
COMMENT UA: ABNORMAL
CREAT SERPL-MCNC: 0.74 MG/DL (ref 0.5–0.9)
CRYSTALS, UA: ABNORMAL /HPF
DIFFERENTIAL TYPE: NORMAL
EOSINOPHILS RELATIVE PERCENT: 1 % (ref 1–4)
EPITHELIAL CELLS UA: ABNORMAL /HPF (ref 0–5)
GFR AFRICAN AMERICAN: >60 ML/MIN
GFR NON-AFRICAN AMERICAN: >60 ML/MIN
GFR SERPL CREATININE-BSD FRML MDRD: ABNORMAL ML/MIN/{1.73_M2}
GFR SERPL CREATININE-BSD FRML MDRD: ABNORMAL ML/MIN/{1.73_M2}
GLOBULIN: ABNORMAL G/DL (ref 1.5–3.8)
GLUCOSE BLD-MCNC: 107 MG/DL (ref 70–99)
GLUCOSE URINE: NEGATIVE
HCT VFR BLD CALC: 38.5 % (ref 36–46)
HEMOGLOBIN: 13 G/DL (ref 12–16)
IMMATURE GRANULOCYTES: NORMAL %
KETONES, URINE: NEGATIVE
LEUKOCYTE ESTERASE, URINE: ABNORMAL
LIPASE: 29 U/L (ref 13–60)
LYMPHOCYTES # BLD: 35 % (ref 24–44)
MCH RBC QN AUTO: 30.3 PG (ref 26–34)
MCHC RBC AUTO-ENTMCNC: 33.6 G/DL (ref 31–37)
MCV RBC AUTO: 90.1 FL (ref 80–100)
MONOCYTES # BLD: 6 % (ref 2–11)
MUCUS: ABNORMAL
NITRITE, URINE: NEGATIVE
NRBC AUTOMATED: NORMAL PER 100 WBC
OTHER OBSERVATIONS UA: ABNORMAL
PDW BLD-RTO: 13.6 % (ref 12.5–15.4)
PH UA: 6 (ref 5–8)
PLATELET # BLD: 189 K/UL (ref 140–450)
PLATELET ESTIMATE: NORMAL
PMV BLD AUTO: 7.1 FL (ref 6–12)
POTASSIUM SERPL-SCNC: 3.7 MMOL/L (ref 3.7–5.3)
PROTEIN UA: NEGATIVE
RBC # BLD: 4.28 M/UL (ref 4–5.2)
RBC # BLD: NORMAL 10*6/UL
RBC UA: ABNORMAL /HPF (ref 0–2)
RENAL EPITHELIAL, UA: ABNORMAL /HPF
SEG NEUTROPHILS: 57 % (ref 36–66)
SEGMENTED NEUTROPHILS ABSOLUTE COUNT: 2.7 K/UL (ref 1.8–7.7)
SODIUM BLD-SCNC: 136 MMOL/L (ref 135–144)
SPECIFIC GRAVITY UA: 1.03 (ref 1–1.03)
TOTAL PROTEIN: 6.3 G/DL (ref 6.4–8.3)
TRICHOMONAS: ABNORMAL
TURBIDITY: CLEAR
URINE HGB: NEGATIVE
UROBILINOGEN, URINE: NORMAL
WBC # BLD: 4.8 K/UL (ref 3.5–11)
WBC # BLD: NORMAL 10*3/UL
WBC UA: ABNORMAL /HPF (ref 0–5)
YEAST: ABNORMAL

## 2022-01-29 PROCEDURE — 96374 THER/PROPH/DIAG INJ IV PUSH: CPT

## 2022-01-29 PROCEDURE — 2580000003 HC RX 258: Performed by: EMERGENCY MEDICINE

## 2022-01-29 PROCEDURE — 85025 COMPLETE CBC W/AUTO DIFF WBC: CPT

## 2022-01-29 PROCEDURE — 81001 URINALYSIS AUTO W/SCOPE: CPT

## 2022-01-29 PROCEDURE — 6360000002 HC RX W HCPCS: Performed by: NURSE PRACTITIONER

## 2022-01-29 PROCEDURE — 99284 EMERGENCY DEPT VISIT MOD MDM: CPT

## 2022-01-29 PROCEDURE — 74177 CT ABD & PELVIS W/CONTRAST: CPT

## 2022-01-29 PROCEDURE — 80048 BASIC METABOLIC PNL TOTAL CA: CPT

## 2022-01-29 PROCEDURE — 6360000004 HC RX CONTRAST MEDICATION: Performed by: EMERGENCY MEDICINE

## 2022-01-29 PROCEDURE — 36415 COLL VENOUS BLD VENIPUNCTURE: CPT

## 2022-01-29 PROCEDURE — 82150 ASSAY OF AMYLASE: CPT

## 2022-01-29 PROCEDURE — 83690 ASSAY OF LIPASE: CPT

## 2022-01-29 PROCEDURE — 80076 HEPATIC FUNCTION PANEL: CPT

## 2022-01-29 PROCEDURE — 2580000003 HC RX 258: Performed by: NURSE PRACTITIONER

## 2022-01-29 RX ORDER — 0.9 % SODIUM CHLORIDE 0.9 %
80 INTRAVENOUS SOLUTION INTRAVENOUS ONCE
Status: COMPLETED | OUTPATIENT
Start: 2022-01-29 | End: 2022-01-29

## 2022-01-29 RX ORDER — 0.9 % SODIUM CHLORIDE 0.9 %
1000 INTRAVENOUS SOLUTION INTRAVENOUS ONCE
Status: COMPLETED | OUTPATIENT
Start: 2022-01-29 | End: 2022-01-29

## 2022-01-29 RX ORDER — SODIUM CHLORIDE 0.9 % (FLUSH) 0.9 %
10 SYRINGE (ML) INJECTION PRN
Status: DISCONTINUED | OUTPATIENT
Start: 2022-01-29 | End: 2022-01-29 | Stop reason: HOSPADM

## 2022-01-29 RX ORDER — MORPHINE SULFATE 2 MG/ML
2 INJECTION, SOLUTION INTRAMUSCULAR; INTRAVENOUS ONCE
Status: COMPLETED | OUTPATIENT
Start: 2022-01-29 | End: 2022-01-29

## 2022-01-29 RX ORDER — ONDANSETRON 2 MG/ML
4 INJECTION INTRAMUSCULAR; INTRAVENOUS ONCE
Status: COMPLETED | OUTPATIENT
Start: 2022-01-29 | End: 2022-01-29

## 2022-01-29 RX ADMIN — SODIUM CHLORIDE 80 ML: 9 INJECTION, SOLUTION INTRAVENOUS at 17:55

## 2022-01-29 RX ADMIN — MORPHINE SULFATE 2 MG: 2 INJECTION, SOLUTION INTRAMUSCULAR; INTRAVENOUS at 17:31

## 2022-01-29 RX ADMIN — ONDANSETRON 4 MG: 2 INJECTION INTRAMUSCULAR; INTRAVENOUS at 17:31

## 2022-01-29 RX ADMIN — IOPAMIDOL 75 ML: 755 INJECTION, SOLUTION INTRAVENOUS at 17:55

## 2022-01-29 RX ADMIN — SODIUM CHLORIDE 1000 ML: 9 INJECTION, SOLUTION INTRAVENOUS at 17:30

## 2022-01-29 RX ADMIN — SODIUM CHLORIDE, PRESERVATIVE FREE 10 ML: 5 INJECTION INTRAVENOUS at 17:55

## 2022-01-29 ASSESSMENT — PAIN DESCRIPTION - PAIN TYPE
TYPE: ACUTE PAIN
TYPE: ACUTE PAIN

## 2022-01-29 ASSESSMENT — PAIN SCALES - GENERAL
PAINLEVEL_OUTOF10: 4
PAINLEVEL_OUTOF10: 9
PAINLEVEL_OUTOF10: 9

## 2022-01-29 ASSESSMENT — ENCOUNTER SYMPTOMS
CONSTIPATION: 0
ANAL BLEEDING: 0
VOMITING: 0
DIARRHEA: 0
BACK PAIN: 1
RECTAL PAIN: 0
NAUSEA: 1
ABDOMINAL DISTENTION: 0
EYES NEGATIVE: 1
RESPIRATORY NEGATIVE: 1
ABDOMINAL PAIN: 1
BLOOD IN STOOL: 0

## 2022-01-29 ASSESSMENT — PAIN DESCRIPTION - ORIENTATION
ORIENTATION: RIGHT
ORIENTATION: RIGHT

## 2022-01-29 ASSESSMENT — PAIN - FUNCTIONAL ASSESSMENT: PAIN_FUNCTIONAL_ASSESSMENT: PREVENTS OR INTERFERES SOME ACTIVE ACTIVITIES AND ADLS

## 2022-01-29 ASSESSMENT — PAIN DESCRIPTION - FREQUENCY
FREQUENCY: INTERMITTENT
FREQUENCY: INTERMITTENT

## 2022-01-29 ASSESSMENT — PAIN DESCRIPTION - DESCRIPTORS
DESCRIPTORS: ACHING
DESCRIPTORS: ACHING

## 2022-01-29 ASSESSMENT — PAIN DESCRIPTION - LOCATION
LOCATION: ABDOMEN;BACK
LOCATION: ABDOMEN;BACK

## 2022-01-29 NOTE — ED PROVIDER NOTES
[id number] [facility] ED  [address] [facility] [address]  Phone: [phone]  Fax: [phone]        Pt Name: Valente Jenkins  MRN: [id number]  [place] 1955  Date of evaluation: 1/29/22    [address] [place] [address]       Chief Complaint   Patient presents with    Abdominal Pain       HISTORY OF PRESENT ILLNESS (Location/Symptom, Timing/Onset, Context/Setting, Quality, Duration, Modifying Factors, Severity)      Valente Jenkins is a 78 y.o. female with no pertinent PMH who presents to the ED via private auto with abdominal pain. Patient states her pain is primarily in the right upper quadrant. Patient reports she is experiencing some nausea as well, but no vomiting. Symptoms have been present off and on for about a month now. Patient denies any chest pain or shortness of breath. Patient denies any recent illness or injury. Patient denies any recent fever, chills, body aches. Patient states she did see her GYN and her PCP. Her PCP did order an x-ray, which showed constipation as well as a history of a gastric sleeve. Patient states her gastric sleeve operation was about a year and a half ago with Dr. Ayde Graff. Patient states she really has not had any complications since. Patient states she was prescribed stool softeners, and has had multiple bowel movements since. Patient denies any dysuria, increase in frequency of urination, or hematuria. Patient denies any hematochezia. Patient denies any pain with defecation. Patient states he does have a history of kidney stones, last one was many years ago. Patient denies any vaginal symptoms.   Patient is postmenopausal.    PAST MEDICAL / SURGICAL / SOCIAL / FAMILY HISTORY     PMH:  has a past medical history of Abdominal pain, Arthritis, Celiac artery compression syndrome ([address] Depression, Fibromyalgia, GERD (gastroesophageal reflux disease), Hx of blood clots, Hyperlipidemia, Kidney stones, Migraines, Sleep apnea, and Wellness examination. Surgical History:  has a past surgical history that includes  section; Total shoulder arthroplasty (Right); bladder suspension; lumbar fusion; Tubal ligation; Tonsillectomy; Wrist Arthroplasty (Left); other surgical history; LASIK (Bilateral); Colonoscopy; Upper gastrointestinal endoscopy; Cystocopy (Right, 5/12/15); Upper gastrointestinal endoscopy (N/A, 2019); joint replacement; Sleeve Gastrectomy (2020); and Sleeve Gastrectomy (N/A, 2020). Social History:  reports that she quit smoking about 43 years ago. Her smoking use included cigarettes. She has a 2.50 pack-year smoking history. She has never used smokeless tobacco. She reports current drug use. Drug: Marijuana Hamzah Quiroga). She reports that she does not drink alcohol. Family History: She indicated that her mother is . She indicated that her father is . She indicated that her brother is . She indicated that her maternal grandmother is . She indicated that her maternal grandfather is . She indicated that her paternal grandmother is . She indicated that her paternal grandfather is . family history includes Alzheimer's Disease in her mother; Cancer in her maternal grandfather; Coronary Art Dis in her mother; Heart Attack in her mother; Heart Disease in her brother; Hypertension in her father; No Known Problems in her maternal grandmother, paternal grandfather, and paternal grandmother; Stroke in her father. Psychiatric History: None    Allergies: Percocet [oxycodone-acetaminophen], Sulfa antibiotics, Linzess [linaclotide], and Tramadol    Home Medications:   Prior to Admission medications    Medication Sig Start Date End Date Taking?  Authorizing Provider   latanoprost (XALATAN) 0.005 % ophthalmic solution INSTILL 1 DROP INTO EACH EYE AT BEDTIME 3/25/21   Historical Provider, MD   brimonidine (ALPHAGAN) 0.2 % ophthalmic solution brimonidine 0.2 % eye drops 20 Historical Provider, MD   brimonidine (ALPHAGAN) 0.2 % ophthalmic solution INSTILL 1 DROP IN BOTH EYES TWICE DAILY 12/6/20   Historical Provider, MD   coenzyme Q10 100 MG CAPS capsule Take 100 mg by mouth daily 3/3/21   Historical Provider, MD   HYDROcodone-acetaminophen (NORCO) 5-325 MG per tablet hydrocodone 5 mg-acetaminophen 325 mg tablet 6/9/20   Historical Provider, MD   hydrOXYzine (ATARAX) 25 MG tablet TAKE 1 TABLET BY MOUTH THREE TIMES DAILY AS NEEDED 2/13/21   Historical Provider, MD   OnabotulinumtoxinA, Cosmetic, 100 units SOLR Inject 156 Units into the muscle once 5/20/20   Historical Provider, MD   ondansetron (ZOFRAN) 4 MG tablet Take 1 tablet by mouth every 8 hours as needed for Nausea or Vomiting 3/9/21   Chinyere Rosa, APRN - CNP   RIBOFLAVIN PO Take 400 mg by mouth daily    Historical Provider, MD   Calcium Carbonate Antacid (TUMS PO) Take by mouth    Historical Provider, MD   Multiple Vitamins-Minerals (CELEBRATE MULTI-COMPLETE 39) CHEW Take by mouth    Historical Provider, MD   MAGNESIUM MALATE PO Take 1,000 mg by mouth daily 3750mg    Historical Provider, MD   VITAMIN D PO Take 25 mcg by mouth daily Will bring dose day of surgery    Historical Provider, MD   pravastatin (PRAVACHOL) 20 MG tablet Take 1 tablet by mouth daily  Patient not taking: Reported on 8/23/2021 10/18/19   Historical Provider, MD   melatonin 3 MG TABS tablet Take 10 mg by mouth nightly as needed     Historical Provider, MD   pantoprazole (PROTONIX) 40 MG tablet Take 40 mg by mouth    Historical Provider, MD   APAP-Isometheptene-Dichloral (MIDRIN PO) Take 1 tablet by mouth as needed 65mg    Historical Provider, MD       REVIEW OF SYSTEMS  (2-9 systems for level 4, 10 ormore for level 5)      Review of Systems   Constitutional: Negative. HENT: Negative. Eyes: Negative. Respiratory: Negative. Cardiovascular: Negative. Gastrointestinal: Positive for abdominal pain and nausea.  Negative for abdominal distention, anal bleeding, blood in stool, constipation, diarrhea, rectal pain and vomiting. Right upper quadrant pain which radiates around to her back, right side   Endocrine: Negative. Genitourinary: Negative. Musculoskeletal: Positive for back pain. Negative for arthralgias, gait problem, joint swelling, myalgias, neck pain and neck stiffness. History of chronic back pain   Skin: Negative. Neurological: Negative. Hematological: Negative. Psychiatric/Behavioral: Negative. All other systems negative except as marked. PHYSICAL EXAM  (up to 7 for level 4, 8 or more for level 5)      INITIAL VITALS:  height is 5' 5\" (1.651 m) and weight is 68 kg (150 lb). Her skin temperature is 97.8 °F (36.6 °C). Her blood pressure is 92/62 and her pulse is 60. Her respiration is 16 and oxygen saturation is 100%. Vital signs reviewed. Physical Exam  Constitutional:       Appearance: She is well-developed. HENT:      Head: Normocephalic. Mouth/Throat:      Mouth: Mucous membranes are moist.      Pharynx: Oropharynx is clear. Eyes:      Extraocular Movements: Extraocular movements intact. Cardiovascular:      Rate and Rhythm: Normal rate and regular rhythm. Heart sounds: Normal heart sounds. Pulmonary:      Effort: Pulmonary effort is normal.      Breath sounds: Normal breath sounds. Abdominal:      General: Bowel sounds are normal. There is no distension. Palpations: Abdomen is soft. There is no mass or pulsatile mass. Tenderness: There is abdominal tenderness in the right upper quadrant. There is right CVA tenderness. There is no left CVA tenderness. Skin:     General: Skin is warm and dry. Capillary Refill: Capillary refill takes less than 2 seconds. Neurological:      Mental Status: She is alert and oriented to person, place, and time.    Psychiatric:         Mood and Affect: Mood normal.         Behavior: Behavior normal.           DIFFERENTIAL DIAGNOSIS / MDM     Upon exam, patient resting in her room with her  at bedside. Patient rates her pain as a 9 out of 10, primarily with palpitation of the right upper quadrant. Heart sounds within normal limits upon auscultation. Breath sounds are clear and equal bilaterally. Patient does have right-sided CVA tenderness. Bowel sounds are present auscultation. Patient is experiencing right upper quadrant pain with palpation. Patient denies any chest pain or shortness of breath at this time. No pulsatile mass noted in the abdomen. As stated above, patient does have a history of a gastric sleeve year and a half ago. Will obtain a CT of the abdomen and pelvis, as well as some lab work. Will give patient 2 mg of morphine for pain as well as some Zofran for her nausea. Patient agrees with this plan of care. Patient her profile is within normal limits. No elevation in white blood cell count. CBC is within normal limits. Awaiting CT results. Patient does report improvement of her symptoms after the morphine and Zofran. CT negative for cholecystitis. No kidney stone noted. CT showing a moderate colonic diverticulosis. Showing some retention of stool. Patient educated to ensure she is drinking enough water, continue her MiraLAX and stool softener. Her last BP was 92/62, patient states she does run low and that is normal for her. No dizziness, blurred vision, lightheadedness. Please follow-up with your primary care physician within the next day regarding the new found diverticulosis. Please return to the emergency department with any new concerning symptoms or worsening of symptoms. Patient states understanding of education. Patient is stable for discharge.     PLAN (LABS / IMAGING / EKG):  Orders Placed This Encounter   Procedures    CT ABDOMEN PELVIS W IV CONTRAST Additional Contrast? Oral    CBC Auto Differential    Basic Metabolic Panel    Hepatic Function Panel    Lipase    Amylase  Urinalysis Reflex to Culture    Microscopic Urinalysis    Insert peripheral IV       MEDICATIONS ORDERED:  Orders Placed This Encounter   Medications    morphine (PF) injection 2 mg    ondansetron (ZOFRAN) injection 4 mg    0.9 % sodium chloride bolus    sodium chloride flush 0.9 % injection 10 mL    0.9 % sodium chloride bolus    iopamidol (ISOVUE-370) 76 % injection 75 mL       Controlled Substances Monitoring:     DIAGNOSTIC RESULTS     EKG: All EKG's are interpreted by the Emergency Department Physician who either signs or Co-signs this chart in the absenceof a cardiologist.      RADIOLOGY: All images are read by the radiologist and their interpretations are reviewed. CT ABDOMEN PELVIS W IV CONTRAST Additional Contrast? Oral   Final Result   No acute inflammatory process or bowel obstruction. Please note that the appendix not identified. Moderate colonic diverticulosis.       RECOMMENDATIONS:   Unavailable               LABS:  Results for orders placed or performed during the hospital encounter of 01/29/22   CBC Auto Differential   Result Value Ref Range    WBC 4.8 3.5 - 11.0 k/uL    RBC 4.28 4.0 - 5.2 m/uL    Hemoglobin 13.0 12.0 - 16.0 g/dL    Hematocrit 38.5 36 - 46 %    MCV 90.1 80 - 100 fL    MCH 30.3 26 - 34 pg    MCHC 33.6 31 - 37 g/dL    RDW 13.6 12.5 - 15.4 %    Platelets 418 650 - 792 k/uL    MPV 7.1 6.0 - 12.0 fL    NRBC Automated NOT REPORTED per 100 WBC    Differential Type NOT REPORTED     Seg Neutrophils 57 36 - 66 %    Lymphocytes 35 24 - 44 %    Monocytes 6 2 - 11 %    Eosinophils % 1 1 - 4 %    Basophils 1 0 - 2 %    Immature Granulocytes NOT REPORTED 0 %    Segs Absolute 2.70 1.8 - 7.7 k/uL    Absolute Lymph # 1.70 1.0 - 4.8 k/uL    Absolute Mono # 0.30 0.1 - 1.2 k/uL    Absolute Eos # 0.10 0.0 - 0.4 k/uL    Basophils Absolute 0.10 0.0 - 0.2 k/uL    Absolute Immature Granulocyte NOT REPORTED 0.00 - 0.30 k/uL    WBC Morphology NOT REPORTED     RBC Morphology NOT REPORTED Platelet Estimate NOT REPORTED    Basic Metabolic Panel   Result Value Ref Range    Glucose 107 (H) 70 - 99 mg/dL    BUN 10 8 - 23 mg/dL    CREATININE 0.74 0.50 - 0.90 mg/dL    Bun/Cre Ratio NOT REPORTED 9 - 20    Calcium 9.7 8.6 - 10.4 mg/dL    Sodium 136 135 - 144 mmol/L    Potassium 3.7 3.7 - 5.3 mmol/L    Chloride 101 98 - 107 mmol/L    CO2 25 20 - 31 mmol/L    Anion Gap 10 9 - 17 mmol/L    GFR Non-African American >60 >60 mL/min    GFR African American >60 >60 mL/min    GFR Comment          GFR Staging NOT REPORTED    Hepatic Function Panel   Result Value Ref Range    Albumin 4.0 3.5 - 5.2 g/dL    Alkaline Phosphatase 84 35 - 104 U/L    ALT 24 5 - 33 U/L    AST 25 <32 U/L    Total Bilirubin 0.46 0.3 - 1.2 mg/dL    Bilirubin, Direct 0.12 <0.31 mg/dL    Bilirubin, Indirect 0.34 0.00 - 1.00 mg/dL    Total Protein 6.3 (L) 6.4 - 8.3 g/dL    Globulin NOT REPORTED 1.5 - 3.8 g/dL    Albumin/Globulin Ratio 1.7 1.0 - 2.5   Lipase   Result Value Ref Range    Lipase 29 13 - 60 U/L   Amylase   Result Value Ref Range    Amylase 36 28 - 100 U/L   Urinalysis Reflex to Culture    Specimen: Urine, clean catch   Result Value Ref Range    Color, UA Yellow Yellow    Turbidity UA Clear Clear    Glucose, Ur NEGATIVE NEGATIVE    Bilirubin Urine NEGATIVE NEGATIVE    Ketones, Urine NEGATIVE NEGATIVE    Specific Gravity, UA 1.027 1.005 - 1.030    Urine Hgb NEGATIVE NEGATIVE    pH, UA 6.0 5.0 - 8.0    Protein, UA NEGATIVE NEGATIVE    Urobilinogen, Urine Normal Normal    Nitrite, Urine NEGATIVE NEGATIVE    Leukocyte Esterase, Urine MODERATE (A) NEGATIVE    Urinalysis Comments NOT REPORTED    Microscopic Urinalysis   Result Value Ref Range    -          WBC, UA 2 TO 5 0 - 5 /HPF    RBC, UA 0 TO 2 0 - 2 /HPF    Casts UA NOT REPORTED /LPF    Crystals, UA MODERATE CALCIUM OXALATE (A) None /HPF    Epithelial Cells UA 2 TO 5 0 - 5 /HPF    Renal Epithelial, UA NOT REPORTED 0 /HPF    Bacteria, UA FEW (A) None    Mucus, UA NOT REPORTED None Trichomonas, UA NOT REPORTED None    Amorphous, UA 1+ (A) None    Other Observations UA (A) NOT REQ. Utilizing a urinalysis as the only screening method to exclude a potential uropathogen can be unreliable in many patient populations. Rapid screening tests are less sensitive than culture and if UTI is a clinical possibility, culture should be considered despite a negative urinalysis. Yeast, UA NOT REPORTED None       EMERGENCY DEPARTMENT COURSE           Vitals:    Vitals:    01/29/22 1714 01/29/22 1847 01/29/22 1938   BP: 106/78 105/76 92/62   Pulse: 78 72 60   Resp: 18 18 16   Temp: 97.8 °F (36.6 °C)     TempSrc: Skin     SpO2: 99% 99% 100%   Weight: 68 kg (150 lb)     Height: 5' 5\" (1.651 m)       -------------------------  BP: 92/62, Temp: 97.8 °F (36.6 °C), Pulse: 60, Resp: 16      RE-EVALUATION:  See ED Course notes above. CONSULTS:  None    PROCEDURES:  None    FINAL IMPRESSION      1. Diverticulosis          DISPOSITION / PLAN     CONDITION ON DISPOSITION:   Good / Stable for discharge. PATIENT REFERRED TO:  DO Fernando Courtney  [phone]    Call in 1 day      [facility] ED  [address] [facility] [address] [phone]  Go to   If symptoms worsen      DISCHARGE MEDICATIONS:  Current Discharge Medication List          MARYANA Turcios NP   Emergency Medicine Nurse Practitioner    (Please note that portions of this note were completed with a voice recognition program.  Efforts were made to edit the dictations but occasionally words aremis-transcribed.)     MARYANA Turcios NP  01/29/22 2009

## 2022-01-29 NOTE — ED PROVIDER NOTES
[id number] [facility] ED    [address] [facility] [address] [facility] [address]  Phone: [phone]  Fax: [phone]  Emergency Department  Faculty Attestation    I performed a history and physical examination of the patient and discussed management with the mid level provideer. I reviewed the mid level provider's note and agree with the documented findings and plan of care. Any areas of disagreement are noted on the chart. I was personally present for the key portions of any procedures. I have documented in the chart those procedures where I was not present during the key portions. I have reviewed the emergency nurses triage note. I agree with the chief complaint, past medical history, past surgical history, allergies, medications, social and family history as documented unless otherwise noted below. Documentation of the HPI, Physical Exam and Medical Decision Making performed by medical students or scribes is based on my personal performance of the HPI, PE and MDM. For Physician Assistant/ Nurse Practitioner cases/documentation I have personally evaluated this patient and have completed at least one if not all key elements of the E/M (history, physical exam, and MDM). Additional findings are as noted. Primary Care Physician: Jacinto Dahl DO    CHIEF COMPLAINT       Chief Complaint   Patient presents with    Abdominal Pain       RECENT VITALS:   Temp: 97.8 °F (36.6 °C),  Pulse: 78, Resp: 18, BP: 106/78    LABS:  Labs Reviewed - No data to display         CT ABDOMEN PELVIS W IV CONTRAST Additional Contrast? Oral (Final result)  Result time 01/29/22 19:24:59  Final result by Cori Salinas MD (01/29/22 19:24:59)                Impression:    No acute inflammatory process or bowel obstruction. Please note that the appendix not identified. Moderate colonic diverticulosis.      RECOMMENDATIONS:   Unavailable             Narrative:    EXAMINATION:   CT OF THE ABDOMEN AND PELVIS WITH CONTRAST 1/29/2022 5:28 upper quadrant. She has no distention or tympany however. The CT scan demonstrates no active kidney stones. She does have some moderate diverticulosis without diverticulitis and some increased stool burden. I see no evidence of a surgical process. Patient is discharged with plans to follow-up with her doctor. She is discharged in good condition.          Yeni Benjamin MD  01/30/22 5843

## 2022-02-01 ENCOUNTER — TELEPHONE (OUTPATIENT)
Dept: BARIATRICS/WEIGHT MGMT | Age: 67
End: 2022-02-01

## 2022-02-01 NOTE — TELEPHONE ENCOUNTER
Next Visit Date:  Visit date not found    Patient Surgery Date  8/31/20    Type of  Surgery  : sleeve    Patient calls complaining of  Wanting to know if they are able to take Toradol for migraines , please advise

## 2022-06-05 NOTE — PRE-CERTIFICATION NOTE
Medicare Cap   [x] Physical Therapy  [] Speech Therapy  [] Occupational therapy  *PT and Speech caps combine      $2040 Cap limit < kx modifier needed        Patient Name: Augie Scale: 1955    Note:  This is an estimate of charges billed.      Date of [address] Name # units/ charge $$$ charge Daily Total Charge Ongoing Total $$$   11/12/20 PT eval  Therex  Manual 1+1+1 82.82+23.22+21.70  127.74   11/16/20 2TE, 1Man (PTA)  2+1 25.26+19.74+18.45 63.45 191.19   11/19 2 manual  1 therex 2+1 27.39+21.70+23.22 72.31 263.5   11/24 2 manual  1 therex 2+1 27.39+21.70+23.22 72.31 335.81   12/1 2 manual  1 therex 2+1 27.39+21.70+23.22 72.31 408.12   12/3 2 manual  1 therex 2+1 27.39+21.70+23.22 72.31 480.43   12/8 2 manual   1 therex 2+1 27.39+21.70+23.22 72.31 552.74   12/10 2 manual  1 therex 2+1 27.39+21.70+23.22 72.31 625.05   12/15 2 manual  1 therex 2+1 27.39+21.70+23.22 72.31    12/17 2 manual  1 therex 2+1 27.39+21.70+23.22 72.31 769.67   12/22 2 manual  2 therex 2+2 29.72+23.22+21.70+21.70 96.34 866.01   12/24 3 Therex 3 29.72+23.22+23.22 76.16 942.17   12/31 3 therex (PT)  1 manual   29.72+23.22+23.22+21.70 97.86 1040.03           1/5/21 3 Therex (PT)  1 manual 3+1 29.72+23.22+23.22+21.70 97.86    1/7/2021 2 Therex  1 manual  2+1 29.72 + 23.22 + 21.70 74.64 172.5   1/12/21 Therex  Manual 2+2 29.72 + 23.22 + 21.70+21.70 96.34 268.84   1/14/21 Therex  Manual 1+2 29.72+21.70+21.70 73.12 341.96
verbal instruction

## 2022-06-17 ENCOUNTER — HOSPITAL ENCOUNTER (OUTPATIENT)
Age: 67
Setting detail: SPECIMEN
Discharge: HOME | End: 2022-06-17

## 2022-06-17 LAB
CHOLESTEROL, FASTING: 170 MG/DL
CHOLESTEROL/HDL RATIO: 2.5
HDLC SERPL-MCNC: 67 MG/DL
LDL CHOLESTEROL: 92 MG/DL (ref 0–130)
TRIGLYCERIDE, FASTING: 57 MG/DL

## 2023-07-20 ENCOUNTER — OFFICE VISIT (OUTPATIENT)
Dept: BARIATRICS/WEIGHT MGMT | Age: 68
End: 2023-07-20

## 2023-07-20 VITALS
HEIGHT: 66 IN | DIASTOLIC BLOOD PRESSURE: 60 MMHG | BODY MASS INDEX: 25.23 KG/M2 | SYSTOLIC BLOOD PRESSURE: 100 MMHG | WEIGHT: 157 LBS | HEART RATE: 70 BPM

## 2023-07-20 DIAGNOSIS — E55.9 VITAMIN D DEFICIENCY: ICD-10-CM

## 2023-07-20 DIAGNOSIS — Z98.84 STATUS POST LAPAROSCOPIC SLEEVE GASTRECTOMY: ICD-10-CM

## 2023-07-20 DIAGNOSIS — E66.3 OVERWEIGHT (BMI 25.0-29.9): ICD-10-CM

## 2023-07-20 DIAGNOSIS — E61.1 LOW IRON: ICD-10-CM

## 2023-07-20 DIAGNOSIS — M15.9 PRIMARY OSTEOARTHRITIS INVOLVING MULTIPLE JOINTS: ICD-10-CM

## 2023-07-20 DIAGNOSIS — E78.5 HYPERLIPIDEMIA, UNSPECIFIED HYPERLIPIDEMIA TYPE: Primary | ICD-10-CM

## 2023-07-20 RX ORDER — KETOCONAZOLE 20 MG/G
CREAM TOPICAL
COMMUNITY
Start: 2023-04-24

## 2023-07-20 RX ORDER — RIBOFLAVIN (VITAMIN B2) 400 MG
1 TABLET ORAL DAILY
COMMUNITY
Start: 2023-07-02 | End: 2023-07-20

## 2023-07-20 RX ORDER — DIAPER,BRIEF,INFANT-TODD,DISP
EACH MISCELLANEOUS
COMMUNITY
Start: 2023-04-24

## 2023-07-20 RX ORDER — ASPIRIN 81 MG/1
81 TABLET, CHEWABLE ORAL
COMMUNITY
Start: 2022-12-14

## 2023-07-27 ENCOUNTER — HOSPITAL ENCOUNTER (OUTPATIENT)
Age: 68
Setting detail: SPECIMEN
Discharge: HOME | End: 2023-07-27

## 2023-07-27 DIAGNOSIS — E78.5 HYPERLIPIDEMIA, UNSPECIFIED HYPERLIPIDEMIA TYPE: ICD-10-CM

## 2023-07-27 DIAGNOSIS — E66.3 OVERWEIGHT (BMI 25.0-29.9): ICD-10-CM

## 2023-07-27 DIAGNOSIS — M15.9 PRIMARY OSTEOARTHRITIS INVOLVING MULTIPLE JOINTS: ICD-10-CM

## 2023-07-27 DIAGNOSIS — E55.9 VITAMIN D DEFICIENCY: ICD-10-CM

## 2023-07-27 DIAGNOSIS — Z98.84 STATUS POST LAPAROSCOPIC SLEEVE GASTRECTOMY: ICD-10-CM

## 2023-07-27 DIAGNOSIS — E61.1 LOW IRON: ICD-10-CM

## 2023-07-27 LAB
25(OH)D3 SERPL-MCNC: 51.8 NG/ML
ALBUMIN SERPL-MCNC: 4.3 G/DL (ref 3.5–5.2)
ALBUMIN/GLOB SERPL: 1.8 {RATIO} (ref 1–2.5)
ALP SERPL-CCNC: 79 U/L (ref 35–104)
ALT SERPL-CCNC: 15 U/L (ref 5–33)
ANION GAP SERPL CALCULATED.3IONS-SCNC: 12 MMOL/L (ref 9–17)
AST SERPL-CCNC: 23 U/L
BASOPHILS # BLD: 0.05 K/UL (ref 0–0.2)
BASOPHILS NFR BLD: 1 % (ref 0–2)
BILIRUB SERPL-MCNC: 0.6 MG/DL (ref 0.3–1.2)
BUN SERPL-MCNC: 14 MG/DL (ref 8–23)
CALCIUM SERPL-MCNC: 9.9 MG/DL (ref 8.6–10.4)
CHLORIDE SERPL-SCNC: 105 MMOL/L (ref 98–107)
CHOLEST SERPL-MCNC: 194 MG/DL
CHOLESTEROL/HDL RATIO: 2.3
CO2 SERPL-SCNC: 26 MMOL/L (ref 20–31)
CREAT SERPL-MCNC: 0.7 MG/DL (ref 0.5–0.9)
EOSINOPHIL # BLD: 0.1 K/UL (ref 0–0.44)
EOSINOPHILS RELATIVE PERCENT: 3 % (ref 1–4)
ERYTHROCYTE [DISTWIDTH] IN BLOOD BY AUTOMATED COUNT: 12.7 % (ref 11.8–14.4)
FERRITIN SERPL-MCNC: 112 NG/ML (ref 13–150)
FOLATE SERPL-MCNC: >20 NG/ML
GFR SERPL CREATININE-BSD FRML MDRD: >60 ML/MIN/1.73M2
GLUCOSE SERPL-MCNC: 94 MG/DL (ref 70–99)
HCT VFR BLD AUTO: 40.3 % (ref 36.3–47.1)
HDLC SERPL-MCNC: 84 MG/DL
HGB BLD-MCNC: 13.3 G/DL (ref 11.9–15.1)
IMM GRANULOCYTES # BLD AUTO: <0.03 K/UL (ref 0–0.3)
IMM GRANULOCYTES NFR BLD: 0 %
IRON SATN MFR SERPL: 38 % (ref 20–55)
IRON SERPL-MCNC: 108 UG/DL (ref 37–145)
LDLC SERPL CALC-MCNC: 99 MG/DL (ref 0–130)
LYMPHOCYTES NFR BLD: 1.15 K/UL (ref 1.1–3.7)
LYMPHOCYTES RELATIVE PERCENT: 31 % (ref 24–43)
MAGNESIUM SERPL-MCNC: 2.4 MG/DL (ref 1.6–2.6)
MCH RBC QN AUTO: 30.9 PG (ref 25.2–33.5)
MCHC RBC AUTO-ENTMCNC: 33 G/DL (ref 28.4–34.8)
MCV RBC AUTO: 93.5 FL (ref 82.6–102.9)
MONOCYTES NFR BLD: 0.28 K/UL (ref 0.1–1.2)
MONOCYTES NFR BLD: 8 % (ref 3–12)
NEUTROPHILS NFR BLD: 57 % (ref 36–65)
NEUTS SEG NFR BLD: 2.11 K/UL (ref 1.5–8.1)
NRBC BLD-RTO: 0 PER 100 WBC
PLATELET # BLD AUTO: 170 K/UL (ref 138–453)
PMV BLD AUTO: 9.4 FL (ref 8.1–13.5)
POTASSIUM SERPL-SCNC: 4.6 MMOL/L (ref 3.7–5.3)
PROT SERPL-MCNC: 6.7 G/DL (ref 6.4–8.3)
PTH-INTACT SERPL-MCNC: 47.4 PG/ML (ref 14–72)
RBC # BLD AUTO: 4.31 M/UL (ref 3.95–5.11)
SODIUM SERPL-SCNC: 143 MMOL/L (ref 135–144)
T4 FREE SERPL-MCNC: 1.2 NG/DL (ref 0.9–1.7)
TIBC SERPL-MCNC: 288 UG/DL (ref 250–450)
TRIGL SERPL-MCNC: 53 MG/DL
TSH SERPL DL<=0.05 MIU/L-ACNC: 2.45 UIU/ML (ref 0.3–5)
UNSATURATED IRON BINDING CAPACITY: 180 UG/DL (ref 112–347)
VIT B12 SERPL-MCNC: 1045 PG/ML (ref 232–1245)
WBC OTHER # BLD: 3.7 K/UL (ref 3.5–11.3)

## 2023-07-29 LAB — ZINC SERPL-MCNC: 82.2 UG/DL (ref 60–120)

## 2023-07-30 LAB
RETINYL PALMITATE: <0.02 MG/L (ref 0–0.1)
VITAMIN A LEVEL: 0.56 MG/L (ref 0.3–1.2)
VITAMIN A, INTERP: NORMAL

## 2023-07-31 LAB — VIT B1 PYROPHOSHATE BLD-SCNC: 131 NMOL/L (ref 70–180)

## 2023-09-13 NOTE — FLOWSHEET NOTE
[] [facility]) - [facility]  [facility]  [address]  [phone]  F: [phone] [] [address]  [employer] [address]   [address]  P: [phone]  F: [phone] [] [address] &  Therapy  [address]  P: [phone]  F: [phone] [x] [address]  P: [phone]  F: [phone] [] [address]  [facility]   [address]   [place]: [phone]  F: [phone]      Physical Therapy Daily Treatment Note    Date:  12/10/2020  Patient Name:  Hudson Welsh    :  1955  MRN: [id number]  Physician: Dr. Ulysses Jamison, DO                            Insurance: Medicare   Medical Diagnosis: R thigh pain, L PSIS pain                     Rehab Codes: M79.65, M51.86, M46.1  Onset date: ~10/11/20                       Next 's appt.: N/A    Visit# / total visits: ; Progress note for Medicare patient due at visit 10     Cancels/No Shows: 0/0    Subjective:    Pain:  [x] Yes  [] No Location: R post thigh  Pain Rating: (0-10 scale) 4/10  Pain altered Tx:  [x] No  [] Yes  Action:  Comments: Pt arrives with increased posterior thigh/hip pain. States it was severe after last visit, and woke pt up in the night.      Objective:  Exercises:  Exercise  Low back pain Reps/ Time Weight/ Level Comments    NUSTEP  5' L5   x   SB Gastroc Str 2x30\"     x   Stool HS Str  2x30\"   x          TG squats 2x10       4 way hip 10x Active              *Clamshells 3x10     x   *Bridges 2x10, 2\" hold    cues for TA first  x   Supine march +TA  2x10   x   *LTR 2x10     x   *Supine piriformis stretch 3x30\"     x                                Other: *Distributed as HEP     Manual: Hypervolt to L piriformis, DI to L piriformis     Integrative Dry Needling: LLE piriformis multiple points Homeostatic point: L sided inferior cluneal.    Treatment Charges: Mins Units   []  Modalities     [x]  Ther Exercise 15 1   [x]  Manual Therapy 25 2   []  Ther Activities     []  Aquatics     []  Vasocompression     [x]  Other: Dry Needling 5 0   Total Treatment time 45 3       Assessment: [x] Progressing toward goals. Bolded only this date d/t pt arriving with increased pain, focused on manual and stretching for pain relief. Pt with decreased tightness post manual     [] No change. [] Other:  [x] Patient would continue to benefit from skilled physical therapy services in order to decrease low back and piriformis tightness, increase LLE hip abd strength in order to decrease low back pain. STG/LTG    STG: (to be met in 10 treatments)  1. ? Pain: Decrease pain levels to 4/10 at worst  2. ? ROM: Increase flexibility and AROM limitations throughout to equal bilat to reduce difficulty with ADLs  3. ? Strength: Increase L hip abd strength to 3+/5  4. ? Function: Pt to report ambulating at home with decreased low back pain  5. Independent with Home Exercise Programs        LTG: (to be met in 20 treatments)  1. Improve score on assessment tool from 54% impaired to 25% impaired, demonstrating an improvement in ADLs  2. Reduce pain levels to 0/10                    Patient goals: To \"walk normal\" and decrease back pain    Pt. Education:  [x] Yes  [] No  [] Reviewed Prior HEP/Ed  Method of Education: [] Verbal  [] Demo  [] Written  Comprehension of Education:  [] Verbalizes understanding. [] Demonstrates understanding. [] Needs review. [x] Demonstrates/verbalizes HEP/Ed previously given. Plan: [x] Continue current frequency toward long and short term goals.     [] Specific Instructions for subsequent treatments:     Frequency:  2 x/week for 20 visits      Time In: 1100           Time Out: 1150    Electronically signed by:  Agustin Mono PT oral

## 2023-10-13 ENCOUNTER — HOSPITAL ENCOUNTER (OUTPATIENT)
Age: 68
Setting detail: SPECIMEN
Discharge: HOME | End: 2023-10-13

## 2023-10-14 LAB
EST. AVERAGE GLUCOSE BLD GHB EST-MCNC: 100 MG/DL
HBA1C MFR BLD: 5.1 % (ref 4–6)

## 2024-08-12 ENCOUNTER — HOSPITAL ENCOUNTER (OUTPATIENT)
Age: 69
Setting detail: SPECIMEN
Discharge: HOME | End: 2024-08-12

## 2024-08-12 LAB
EST. AVERAGE GLUCOSE BLD GHB EST-MCNC: 105 MG/DL
HBA1C MFR BLD: 5.3 % (ref 4–6)

## 2024-09-04 ENCOUNTER — TELEPHONE (OUTPATIENT)
Dept: BARIATRICS/WEIGHT MGMT | Age: 69
End: 2024-09-04

## 2024-09-04 NOTE — TELEPHONE ENCOUNTER
Patient had a sleeve in 2020 and her knee is hurting and wants to use motrin sparingly. She is already on a ppi. Is this ok?

## 2024-09-04 NOTE — TELEPHONE ENCOUNTER
Yes, motrin is okay but very sparingly. I dont want her to use it several days in a row. She can try OTC Voltaren gel which is a topical anti-inflammatory and works really well. If she uses that and still feels like motrin is needed, that is okay

## 2024-09-10 ENCOUNTER — HOSPITAL ENCOUNTER (OUTPATIENT)
Age: 69
Discharge: HOME | End: 2024-09-12
Payer: MEDICARE

## 2024-09-10 ENCOUNTER — HOSPITAL ENCOUNTER (OUTPATIENT)
Dept: GENERAL RADIOLOGY | Age: 69
Discharge: HOME | End: 2024-09-12
Payer: MEDICARE

## 2024-09-10 DIAGNOSIS — M25.561 PAIN, JOINT, KNEE, RIGHT: ICD-10-CM

## 2024-09-10 PROCEDURE — 73560 X-RAY EXAM OF KNEE 1 OR 2: CPT

## 2024-09-11 ENCOUNTER — HOSPITAL ENCOUNTER (OUTPATIENT)
Age: 69
Setting detail: SPECIMEN
Discharge: HOME | End: 2024-09-11

## 2024-09-11 LAB
25(OH)D3 SERPL-MCNC: 37.8 NG/ML (ref 30–100)
ALBUMIN SERPL-MCNC: 4.2 G/DL (ref 3.5–5.2)
ALBUMIN/GLOB SERPL: 2 {RATIO} (ref 1–2.5)
ALP SERPL-CCNC: 81 U/L (ref 35–104)
ALT SERPL-CCNC: 17 U/L (ref 10–35)
ANION GAP SERPL CALCULATED.3IONS-SCNC: 10 MMOL/L (ref 9–16)
AST SERPL-CCNC: 23 U/L (ref 10–35)
BASOPHILS # BLD: 0.03 K/UL (ref 0–0.2)
BASOPHILS NFR BLD: 1 % (ref 0–2)
BILIRUB SERPL-MCNC: 0.3 MG/DL (ref 0–1.2)
BUN SERPL-MCNC: 10 MG/DL (ref 8–23)
CALCIUM SERPL-MCNC: 9.4 MG/DL (ref 8.6–10.4)
CHLORIDE SERPL-SCNC: 106 MMOL/L (ref 98–107)
CHOLEST SERPL-MCNC: 212 MG/DL (ref 0–199)
CHOLESTEROL/HDL RATIO: 2
CO2 SERPL-SCNC: 27 MMOL/L (ref 20–31)
CREAT SERPL-MCNC: 0.8 MG/DL (ref 0.5–0.9)
EOSINOPHIL # BLD: 0.04 K/UL (ref 0–0.44)
EOSINOPHILS RELATIVE PERCENT: 1 % (ref 1–4)
ERYTHROCYTE [DISTWIDTH] IN BLOOD BY AUTOMATED COUNT: 13.1 % (ref 11.8–14.4)
FOLATE SERPL-MCNC: 18.2 NG/ML (ref 4.8–24.2)
GFR, ESTIMATED: 80 ML/MIN/1.73M2
GLUCOSE SERPL-MCNC: 99 MG/DL (ref 74–99)
HCT VFR BLD AUTO: 39.4 % (ref 36.3–47.1)
HDLC SERPL-MCNC: 90 MG/DL
HGB BLD-MCNC: 13.3 G/DL (ref 11.9–15.1)
IMM GRANULOCYTES # BLD AUTO: <0.03 K/UL (ref 0–0.3)
IMM GRANULOCYTES NFR BLD: 0 %
LDLC SERPL CALC-MCNC: 111 MG/DL (ref 0–100)
LYMPHOCYTES NFR BLD: 0.93 K/UL (ref 1.1–3.7)
LYMPHOCYTES RELATIVE PERCENT: 23 % (ref 24–43)
MAGNESIUM SERPL-MCNC: 2.2 MG/DL (ref 1.6–2.4)
MCH RBC QN AUTO: 32 PG (ref 25.2–33.5)
MCHC RBC AUTO-ENTMCNC: 33.8 G/DL (ref 28.4–34.8)
MCV RBC AUTO: 94.9 FL (ref 82.6–102.9)
MONOCYTES NFR BLD: 0.44 K/UL (ref 0.1–1.2)
MONOCYTES NFR BLD: 11 % (ref 3–12)
NEUTROPHILS NFR BLD: 64 % (ref 36–65)
NEUTS SEG NFR BLD: 2.56 K/UL (ref 1.5–8.1)
NRBC BLD-RTO: 0 PER 100 WBC
PLATELET # BLD AUTO: 162 K/UL (ref 138–453)
PMV BLD AUTO: 9.6 FL (ref 8.1–13.5)
POTASSIUM SERPL-SCNC: 3.4 MMOL/L (ref 3.7–5.3)
PROT SERPL-MCNC: 6.5 G/DL (ref 6.6–8.7)
RBC # BLD AUTO: 4.15 M/UL (ref 3.95–5.11)
SODIUM SERPL-SCNC: 143 MMOL/L (ref 136–145)
TRIGL SERPL-MCNC: 56 MG/DL
TSH SERPL DL<=0.05 MIU/L-ACNC: 1.44 UIU/ML (ref 0.27–4.2)
VIT B12 SERPL-MCNC: 870 PG/ML (ref 232–1245)
VLDLC SERPL CALC-MCNC: 11 MG/DL
WBC OTHER # BLD: 4 K/UL (ref 3.5–11.3)

## 2024-09-19 ENCOUNTER — OFFICE VISIT (OUTPATIENT)
Dept: BARIATRICS/WEIGHT MGMT | Age: 69
End: 2024-09-19
Payer: MEDICARE

## 2024-09-19 VITALS
BODY MASS INDEX: 25.33 KG/M2 | HEIGHT: 65 IN | DIASTOLIC BLOOD PRESSURE: 84 MMHG | WEIGHT: 152 LBS | HEART RATE: 60 BPM | SYSTOLIC BLOOD PRESSURE: 118 MMHG

## 2024-09-19 DIAGNOSIS — Z98.84 STATUS POST LAPAROSCOPIC SLEEVE GASTRECTOMY: ICD-10-CM

## 2024-09-19 DIAGNOSIS — M79.7 FIBROMYALGIA: ICD-10-CM

## 2024-09-19 DIAGNOSIS — E66.3 OVERWEIGHT (BMI 25.0-29.9): ICD-10-CM

## 2024-09-19 DIAGNOSIS — Z87.442 HISTORY OF KIDNEY STONES: ICD-10-CM

## 2024-09-19 DIAGNOSIS — M51.36 DDD (DEGENERATIVE DISC DISEASE), LUMBAR: ICD-10-CM

## 2024-09-19 DIAGNOSIS — E78.5 HYPERLIPIDEMIA, UNSPECIFIED HYPERLIPIDEMIA TYPE: Primary | ICD-10-CM

## 2024-09-19 DIAGNOSIS — G89.29 CHRONIC PAIN OF RIGHT KNEE: ICD-10-CM

## 2024-09-19 DIAGNOSIS — M25.561 CHRONIC PAIN OF RIGHT KNEE: ICD-10-CM

## 2024-09-19 DIAGNOSIS — Z86.711 HISTORY OF PULMONARY EMBOLISM: ICD-10-CM

## 2024-09-19 DIAGNOSIS — M15.9 PRIMARY OSTEOARTHRITIS INVOLVING MULTIPLE JOINTS: ICD-10-CM

## 2024-09-19 PROCEDURE — G8419 CALC BMI OUT NRM PARAM NOF/U: HCPCS | Performed by: NURSE PRACTITIONER

## 2024-09-19 PROCEDURE — 99213 OFFICE O/P EST LOW 20 MIN: CPT | Performed by: NURSE PRACTITIONER

## 2024-09-19 PROCEDURE — G8427 DOCREV CUR MEDS BY ELIG CLIN: HCPCS | Performed by: NURSE PRACTITIONER

## 2024-09-19 PROCEDURE — 1123F ACP DISCUSS/DSCN MKR DOCD: CPT | Performed by: NURSE PRACTITIONER

## 2024-09-19 PROCEDURE — 1090F PRES/ABSN URINE INCON ASSESS: CPT | Performed by: NURSE PRACTITIONER

## 2024-09-19 PROCEDURE — 3017F COLORECTAL CA SCREEN DOC REV: CPT | Performed by: NURSE PRACTITIONER

## 2024-09-19 PROCEDURE — G8400 PT W/DXA NO RESULTS DOC: HCPCS | Performed by: NURSE PRACTITIONER

## 2024-09-19 PROCEDURE — 1036F TOBACCO NON-USER: CPT | Performed by: NURSE PRACTITIONER

## 2024-10-04 ENCOUNTER — HOSPITAL ENCOUNTER (OUTPATIENT)
Age: 69
Setting detail: SPECIMEN
Discharge: HOME | End: 2024-10-04

## 2024-10-04 DIAGNOSIS — M51.369 DDD (DEGENERATIVE DISC DISEASE), LUMBAR: ICD-10-CM

## 2024-10-04 DIAGNOSIS — M15.0 PRIMARY OSTEOARTHRITIS INVOLVING MULTIPLE JOINTS: ICD-10-CM

## 2024-10-04 DIAGNOSIS — Z86.711 HISTORY OF PULMONARY EMBOLISM: ICD-10-CM

## 2024-10-04 DIAGNOSIS — E78.5 HYPERLIPIDEMIA, UNSPECIFIED HYPERLIPIDEMIA TYPE: ICD-10-CM

## 2024-10-04 DIAGNOSIS — E87.6 HYPOKALEMIA: ICD-10-CM

## 2024-10-04 DIAGNOSIS — E66.3 OVERWEIGHT (BMI 25.0-29.9): ICD-10-CM

## 2024-10-04 DIAGNOSIS — Z98.84 STATUS POST LAPAROSCOPIC SLEEVE GASTRECTOMY: ICD-10-CM

## 2024-10-04 DIAGNOSIS — M79.7 FIBROMYALGIA: ICD-10-CM

## 2024-10-04 DIAGNOSIS — E87.6 HYPOKALEMIA: Primary | ICD-10-CM

## 2024-10-04 DIAGNOSIS — Z87.442 HISTORY OF KIDNEY STONES: ICD-10-CM

## 2024-10-04 LAB
ALBUMIN SERPL-MCNC: 4.2 G/DL (ref 3.5–5.2)
ALBUMIN/GLOB SERPL: 2 {RATIO} (ref 1–2.5)
ALP SERPL-CCNC: 77 U/L (ref 35–104)
ALT SERPL-CCNC: 18 U/L (ref 10–35)
ANION GAP SERPL CALCULATED.3IONS-SCNC: 10 MMOL/L (ref 9–16)
AST SERPL-CCNC: 26 U/L (ref 10–35)
BILIRUB SERPL-MCNC: 0.4 MG/DL (ref 0–1.2)
BUN SERPL-MCNC: 12 MG/DL (ref 8–23)
CALCIUM SERPL-MCNC: 9.6 MG/DL (ref 8.6–10.4)
CHLORIDE SERPL-SCNC: 104 MMOL/L (ref 98–107)
CO2 SERPL-SCNC: 28 MMOL/L (ref 20–31)
CREAT SERPL-MCNC: 0.9 MG/DL (ref 0.5–0.9)
FERRITIN SERPL-MCNC: 121 NG/ML (ref 13–150)
GFR, ESTIMATED: 73 ML/MIN/1.73M2
GLUCOSE SERPL-MCNC: 94 MG/DL (ref 74–99)
IRON SATN MFR SERPL: 32 % (ref 20–55)
IRON SERPL-MCNC: 87 UG/DL (ref 37–145)
POTASSIUM SERPL-SCNC: 3.6 MMOL/L (ref 3.7–5.3)
PROT SERPL-MCNC: 6.5 G/DL (ref 6.6–8.7)
PTH-INTACT SERPL-MCNC: 43 PG/ML (ref 15–65)
SODIUM SERPL-SCNC: 142 MMOL/L (ref 136–145)
TIBC SERPL-MCNC: 270 UG/DL (ref 250–450)
UNSATURATED IRON BINDING CAPACITY: 183 UG/DL (ref 112–347)

## 2024-10-04 RX ORDER — POTASSIUM CHLORIDE 750 MG/1
10 TABLET, EXTENDED RELEASE ORAL DAILY
Qty: 30 TABLET | Refills: 0 | Status: SHIPPED | OUTPATIENT
Start: 2024-10-04

## 2024-10-07 LAB
RETINYL PALMITATE: 0.04 MG/L (ref 0–0.1)
VITAMIN A LEVEL: 0.56 MG/L (ref 0.3–1.2)
VITAMIN A, INTERP: NORMAL
ZINC SERPL-MCNC: 91.5 UG/DL (ref 60–120)

## 2024-10-08 ENCOUNTER — HOSPITAL ENCOUNTER (OUTPATIENT)
Age: 69
Setting detail: SPECIMEN
Discharge: HOME | End: 2024-10-08

## 2024-10-08 RX ORDER — POTASSIUM CHLORIDE 750 MG/1
10 TABLET, EXTENDED RELEASE ORAL DAILY
Qty: 90 TABLET | OUTPATIENT
Start: 2024-10-08

## 2024-10-10 LAB — VIT B1 PYROPHOSHATE BLD-SCNC: 57 NMOL/L (ref 70–180)

## 2024-10-13 LAB
ANA SER QL IA: NEGATIVE
DSDNA IGG SER QL IA: <0.5 IU/ML
ENA SS-A IGG SER QL: <0.3 U/ML
ENA SS-B IGG SER IA-ACNC: <0.3 U/ML
NUCLEAR IGG SER IA-RTO: <0.1 U/ML

## 2024-10-31 DIAGNOSIS — E87.6 HYPOKALEMIA: ICD-10-CM

## 2024-10-31 RX ORDER — POTASSIUM CHLORIDE 750 MG/1
10 TABLET, EXTENDED RELEASE ORAL DAILY
Qty: 30 TABLET | Refills: 0 | OUTPATIENT
Start: 2024-10-31

## 2025-04-08 ENCOUNTER — OFFICE VISIT (OUTPATIENT)
Dept: BARIATRICS/WEIGHT MGMT | Age: 70
End: 2025-04-08
Payer: MEDICARE

## 2025-04-08 VITALS
DIASTOLIC BLOOD PRESSURE: 72 MMHG | SYSTOLIC BLOOD PRESSURE: 98 MMHG | HEART RATE: 71 BPM | HEIGHT: 65 IN | OXYGEN SATURATION: 96 % | BODY MASS INDEX: 28.16 KG/M2 | WEIGHT: 169 LBS

## 2025-04-08 DIAGNOSIS — E61.1 LOW IRON: ICD-10-CM

## 2025-04-08 DIAGNOSIS — E78.5 HYPERLIPIDEMIA, UNSPECIFIED HYPERLIPIDEMIA TYPE: Primary | ICD-10-CM

## 2025-04-08 DIAGNOSIS — K21.9 GASTROESOPHAGEAL REFLUX DISEASE WITHOUT ESOPHAGITIS: ICD-10-CM

## 2025-04-08 DIAGNOSIS — Z98.84 STATUS POST LAPAROSCOPIC SLEEVE GASTRECTOMY: ICD-10-CM

## 2025-04-08 DIAGNOSIS — E66.3 OVERWEIGHT (BMI 25.0-29.9): ICD-10-CM

## 2025-04-08 DIAGNOSIS — Z86.711 HISTORY OF PULMONARY EMBOLISM: ICD-10-CM

## 2025-04-08 DIAGNOSIS — M15.0 PRIMARY OSTEOARTHRITIS INVOLVING MULTIPLE JOINTS: ICD-10-CM

## 2025-04-08 DIAGNOSIS — F32.A DEPRESSION, UNSPECIFIED DEPRESSION TYPE: ICD-10-CM

## 2025-04-08 DIAGNOSIS — Z87.442 HISTORY OF KIDNEY STONES: ICD-10-CM

## 2025-04-08 DIAGNOSIS — E55.9 VITAMIN D DEFICIENCY: ICD-10-CM

## 2025-04-08 DIAGNOSIS — M79.7 FIBROMYALGIA: ICD-10-CM

## 2025-04-08 PROCEDURE — 99213 OFFICE O/P EST LOW 20 MIN: CPT | Performed by: NURSE PRACTITIONER

## 2025-04-08 PROCEDURE — 1159F MED LIST DOCD IN RCRD: CPT | Performed by: NURSE PRACTITIONER

## 2025-04-08 PROCEDURE — 1090F PRES/ABSN URINE INCON ASSESS: CPT | Performed by: NURSE PRACTITIONER

## 2025-04-08 PROCEDURE — 1123F ACP DISCUSS/DSCN MKR DOCD: CPT | Performed by: NURSE PRACTITIONER

## 2025-04-08 PROCEDURE — G8419 CALC BMI OUT NRM PARAM NOF/U: HCPCS | Performed by: NURSE PRACTITIONER

## 2025-04-08 PROCEDURE — G8427 DOCREV CUR MEDS BY ELIG CLIN: HCPCS | Performed by: NURSE PRACTITIONER

## 2025-04-08 PROCEDURE — 1160F RVW MEDS BY RX/DR IN RCRD: CPT | Performed by: NURSE PRACTITIONER

## 2025-04-08 PROCEDURE — 1036F TOBACCO NON-USER: CPT | Performed by: NURSE PRACTITIONER

## 2025-04-08 PROCEDURE — 3017F COLORECTAL CA SCREEN DOC REV: CPT | Performed by: NURSE PRACTITIONER

## 2025-04-08 PROCEDURE — G8400 PT W/DXA NO RESULTS DOC: HCPCS | Performed by: NURSE PRACTITIONER

## 2025-04-08 RX ORDER — PILOCARPINE HYDROCHLORIDE 5 MG/1
1 TABLET, FILM COATED ORAL 3 TIMES DAILY
COMMUNITY
Start: 2024-12-27

## 2025-04-08 RX ORDER — MIRTAZAPINE 7.5 MG/1
7.5 TABLET, FILM COATED ORAL NIGHTLY
COMMUNITY
Start: 2025-02-10

## 2025-04-08 RX ORDER — BUSPIRONE HYDROCHLORIDE 10 MG/1
20 TABLET ORAL 2 TIMES DAILY
COMMUNITY
Start: 2025-03-26

## 2025-04-08 RX ORDER — PREGABALIN 75 MG/1
75 CAPSULE ORAL NIGHTLY
COMMUNITY
Start: 2025-03-22

## 2025-04-08 RX ORDER — FAMOTIDINE 20 MG/1
20 TABLET, FILM COATED ORAL NIGHTLY
Qty: 60 TABLET | Refills: 3 | Status: SHIPPED | OUTPATIENT
Start: 2025-04-08

## 2025-04-08 RX ORDER — PREGABALIN 50 MG/1
50 CAPSULE ORAL EVERY MORNING
COMMUNITY
Start: 2025-03-22

## 2025-04-08 NOTE — PROGRESS NOTES
Medical Nutrition Therapy for Metabolic and Bariatric Surgery  Nutrition Follow-Up: Weight Loss    Nutrition Assessment:  Patient is here for back on track, concerned with weight gain.   Patient had the Gastric Sleeve in 2020.    Patient had to leave prior to RD appointment, will call patient at a later date.    Called patient 4/10/25 to check in, patient reports no needs at this time. Patient aware of how to contact RD if needs arise. RD to remain available.    Maxwell Cloud, ANDREA, LD  Clinical Bariatric Dietitian  [facility]  [phone]  
4/8/2026    T4, Free     Standing Status:   Future     Expected Date:   4/8/2025     Expiration Date:   4/8/2026    Vitamin B12 & Folate     Standing Status:   Future     Expected Date:   4/8/2025     Expiration Date:   4/8/2026    Vitamin B1     Standing Status:   Future     Expected Date:   4/8/2025     Expiration Date:   4/8/2026    Vitamin D 25 Hydroxy     Standing Status:   Future     Expected Date:   4/8/2025     Expiration Date:   4/8/2026    Magnesium     Standing Status:   Future     Expected Date:   4/8/2025     Expiration Date:   4/8/2026    Iron and TIBC     Standing Status:   Future     Expected Date:   4/8/2025     Expiration Date:   4/8/2026     Is Patient Fasting?:   Yes     No of Hours?:   12 hours    Vitamin A     Standing Status:   Future     Expected Date:   4/8/2025     Expiration Date:   4/8/2026    Lipid Panel     Standing Status:   Future     Expected Date:   4/8/2025     Expiration Date:   4/8/2026     Is Patient Fasting?/# of Hours:   12 hrs    PTH, Intact     Standing Status:   Future     Expected Date:   4/8/2025     Expiration Date:   4/8/2026    CBC with Auto Differential     Standing Status:   Future     Expected Date:   4/8/2025     Expiration Date:   4/8/2026    Zinc     Standing Status:   Future     Expected Date:   4/8/2025     Expiration Date:   4/8/2026    Ferritin     Standing Status:   Future     Expected Date:   4/8/2025     Expiration Date:   4/8/2026       Prescriptions this encounter:  Orders Placed This Encounter   Medications    famotidine (PEPCID) 20 MG tablet     Sig: Take 1 tablet by mouth nightly     Dispense:  60 tablet     Refill:  3       Electronically signed by:  Rhiannon Curtis, NINFA

## 2025-04-10 ENCOUNTER — TELEPHONE (OUTPATIENT)
Dept: BARIATRICS/WEIGHT MGMT | Age: 70
End: 2025-04-10

## 2025-04-10 NOTE — TELEPHONE ENCOUNTER
Patient called asking who you would recommend for knee replacement. You had talked to her about a product that you use and she forgot the name of it. Please advise. Thanks..

## 2025-04-21 ENCOUNTER — TELEPHONE (OUTPATIENT)
Dept: BARIATRICS/WEIGHT MGMT | Age: 70
End: 2025-04-21

## 2025-04-21 NOTE — TELEPHONE ENCOUNTER
Next Visit Date:  10/6/2025    Patient Surgery Date  08/31/2020    Type of  Surgery  Sleeve    Patient calls complaining of  she saw Rhiannon last week and c/o GERD worsening. Madison added Pepcid and told her to call if she was no better. She says the Pepcid has not helped at all. Dean said she may need an EGD. Please advise    Onset: 1 month(s) ago  Timing: constant, intermittent  Severity: Moderate after eating    Progression: increasing    Associated Symptoms:  NO , Fever, Chills, Spasms, and Flank Pain    Any allergy  To medications     Sulfa, Tramadol, Linzess    Current  Pharmacy   [facility]    Patient advised:   If  Symptoms worsen seek treatment at  Emergency Room.   You will receive a call back from the office within 24-48 hours.    Is it ok to leave a message if we call back yes

## 2025-04-24 ENCOUNTER — HOSPITAL ENCOUNTER (OUTPATIENT)
Age: 70
Setting detail: SPECIMEN
Discharge: HOME | End: 2025-04-24

## 2025-04-24 DIAGNOSIS — Z98.84 STATUS POST LAPAROSCOPIC SLEEVE GASTRECTOMY: ICD-10-CM

## 2025-04-24 DIAGNOSIS — Z86.711 HISTORY OF PULMONARY EMBOLISM: ICD-10-CM

## 2025-04-24 DIAGNOSIS — E78.5 HYPERLIPIDEMIA, UNSPECIFIED HYPERLIPIDEMIA TYPE: ICD-10-CM

## 2025-04-24 DIAGNOSIS — M79.7 FIBROMYALGIA: ICD-10-CM

## 2025-04-24 DIAGNOSIS — E66.3 OVERWEIGHT (BMI 25.0-29.9): ICD-10-CM

## 2025-04-24 DIAGNOSIS — Z87.442 HISTORY OF KIDNEY STONES: ICD-10-CM

## 2025-04-24 DIAGNOSIS — E61.1 LOW IRON: ICD-10-CM

## 2025-04-24 DIAGNOSIS — M15.0 PRIMARY OSTEOARTHRITIS INVOLVING MULTIPLE JOINTS: ICD-10-CM

## 2025-04-24 DIAGNOSIS — F32.A DEPRESSION, UNSPECIFIED DEPRESSION TYPE: ICD-10-CM

## 2025-04-24 DIAGNOSIS — E55.9 VITAMIN D DEFICIENCY: ICD-10-CM

## 2025-04-24 LAB
25(OH)D3 SERPL-MCNC: 46.4 NG/ML (ref 30–100)
ALBUMIN SERPL-MCNC: 4 G/DL (ref 3.5–5.2)
ALBUMIN/GLOB SERPL: 1.7 {RATIO} (ref 1–2.5)
ALP SERPL-CCNC: 81 U/L (ref 35–104)
ALT SERPL-CCNC: 27 U/L (ref 10–35)
ANION GAP SERPL CALCULATED.3IONS-SCNC: 8 MMOL/L (ref 9–16)
AST SERPL-CCNC: 32 U/L (ref 10–35)
BASOPHILS # BLD: 0.04 K/UL (ref 0–0.2)
BASOPHILS NFR BLD: 1 % (ref 0–2)
BILIRUB SERPL-MCNC: 0.4 MG/DL (ref 0–1.2)
BUN SERPL-MCNC: 13 MG/DL (ref 8–23)
CALCIUM SERPL-MCNC: 9.4 MG/DL (ref 8.6–10.4)
CHLORIDE SERPL-SCNC: 106 MMOL/L (ref 98–107)
CHOLEST SERPL-MCNC: 214 MG/DL (ref 0–199)
CHOLESTEROL/HDL RATIO: 2.4
CO2 SERPL-SCNC: 28 MMOL/L (ref 20–31)
CREAT SERPL-MCNC: 0.9 MG/DL (ref 0.6–0.9)
EOSINOPHIL # BLD: 0.18 K/UL (ref 0–0.44)
EOSINOPHILS RELATIVE PERCENT: 5 % (ref 1–4)
ERYTHROCYTE [DISTWIDTH] IN BLOOD BY AUTOMATED COUNT: 13.2 % (ref 11.8–14.4)
FERRITIN SERPL-MCNC: 62 NG/ML
FOLATE SERPL-MCNC: 31.7 NG/ML (ref 4.8–24.2)
GFR, ESTIMATED: 69 ML/MIN/1.73M2
GLUCOSE SERPL-MCNC: 95 MG/DL (ref 74–99)
HCT VFR BLD AUTO: 41.2 % (ref 36.3–47.1)
HDLC SERPL-MCNC: 90 MG/DL
HGB BLD-MCNC: 13.4 G/DL (ref 11.9–15.1)
IMM GRANULOCYTES # BLD AUTO: <0.03 K/UL (ref 0–0.3)
IMM GRANULOCYTES NFR BLD: 0 %
IRON SATN MFR SERPL: 29 % (ref 20–55)
IRON SERPL-MCNC: 87 UG/DL (ref 37–145)
LDLC SERPL CALC-MCNC: 111 MG/DL (ref 0–100)
LYMPHOCYTES NFR BLD: 1.27 K/UL (ref 1.1–3.7)
LYMPHOCYTES RELATIVE PERCENT: 37 % (ref 24–43)
MAGNESIUM SERPL-MCNC: 2.6 MG/DL (ref 1.6–2.4)
MCH RBC QN AUTO: 31.2 PG (ref 25.2–33.5)
MCHC RBC AUTO-ENTMCNC: 32.5 G/DL (ref 28.4–34.8)
MCV RBC AUTO: 95.8 FL (ref 82.6–102.9)
MONOCYTES NFR BLD: 0.27 K/UL (ref 0.1–1.2)
MONOCYTES NFR BLD: 8 % (ref 3–12)
NEUTROPHILS NFR BLD: 49 % (ref 36–65)
NEUTS SEG NFR BLD: 1.71 K/UL (ref 1.5–8.1)
NRBC BLD-RTO: 0 PER 100 WBC
PLATELET # BLD AUTO: 150 K/UL (ref 138–453)
PMV BLD AUTO: 9.5 FL (ref 8.1–13.5)
POTASSIUM SERPL-SCNC: 4 MMOL/L (ref 3.7–5.3)
PROT SERPL-MCNC: 6.3 G/DL (ref 6.6–8.7)
PTH-INTACT SERPL-MCNC: 53 PG/ML (ref 17.9–58.6)
RBC # BLD AUTO: 4.3 M/UL (ref 3.95–5.11)
SODIUM SERPL-SCNC: 142 MMOL/L (ref 136–145)
T4 FREE SERPL-MCNC: 1 NG/DL (ref 0.92–1.68)
TIBC SERPL-MCNC: 303 UG/DL (ref 250–450)
TRIGL SERPL-MCNC: 64 MG/DL
TSH SERPL DL<=0.05 MIU/L-ACNC: 4.08 UIU/ML (ref 0.27–4.2)
UNSATURATED IRON BINDING CAPACITY: 216 UG/DL (ref 112–347)
VIT B12 SERPL-MCNC: 960 PG/ML (ref 232–1245)
VLDLC SERPL CALC-MCNC: 13 MG/DL (ref 1–30)
WBC OTHER # BLD: 3.5 K/UL (ref 3.5–11.3)

## 2025-04-25 ENCOUNTER — PREP FOR PROCEDURE (OUTPATIENT)
Dept: BARIATRICS/WEIGHT MGMT | Age: 70
End: 2025-04-25

## 2025-04-25 ENCOUNTER — OFFICE VISIT (OUTPATIENT)
Dept: BARIATRICS/WEIGHT MGMT | Age: 70
End: 2025-04-25
Payer: MEDICARE

## 2025-04-25 ENCOUNTER — RESULTS FOLLOW-UP (OUTPATIENT)
Dept: BARIATRICS/WEIGHT MGMT | Age: 70
End: 2025-04-25

## 2025-04-25 VITALS
BODY MASS INDEX: 28.32 KG/M2 | HEART RATE: 60 BPM | SYSTOLIC BLOOD PRESSURE: 128 MMHG | WEIGHT: 170 LBS | OXYGEN SATURATION: 96 % | HEIGHT: 65 IN | DIASTOLIC BLOOD PRESSURE: 82 MMHG

## 2025-04-25 DIAGNOSIS — Z98.84 STATUS POST LAPAROSCOPIC SLEEVE GASTRECTOMY: ICD-10-CM

## 2025-04-25 DIAGNOSIS — Z90.3 S/P GASTRIC SLEEVE PROCEDURE: ICD-10-CM

## 2025-04-25 DIAGNOSIS — K21.9 GASTROESOPHAGEAL REFLUX DISEASE WITHOUT ESOPHAGITIS: Primary | ICD-10-CM

## 2025-04-25 DIAGNOSIS — E66.3 OVERWEIGHT (BMI 25.0-29.9): ICD-10-CM

## 2025-04-25 PROBLEM — R10.9 ABDOMINAL PAIN: Status: ACTIVE | Noted: 2025-04-25

## 2025-04-25 PROCEDURE — 1036F TOBACCO NON-USER: CPT | Performed by: SURGERY

## 2025-04-25 PROCEDURE — 3017F COLORECTAL CA SCREEN DOC REV: CPT | Performed by: SURGERY

## 2025-04-25 PROCEDURE — G8419 CALC BMI OUT NRM PARAM NOF/U: HCPCS | Performed by: SURGERY

## 2025-04-25 PROCEDURE — 1159F MED LIST DOCD IN RCRD: CPT | Performed by: SURGERY

## 2025-04-25 PROCEDURE — 1090F PRES/ABSN URINE INCON ASSESS: CPT | Performed by: SURGERY

## 2025-04-25 PROCEDURE — 99214 OFFICE O/P EST MOD 30 MIN: CPT | Performed by: SURGERY

## 2025-04-25 PROCEDURE — 1123F ACP DISCUSS/DSCN MKR DOCD: CPT | Performed by: SURGERY

## 2025-04-25 PROCEDURE — G8400 PT W/DXA NO RESULTS DOC: HCPCS | Performed by: SURGERY

## 2025-04-25 PROCEDURE — G8427 DOCREV CUR MEDS BY ELIG CLIN: HCPCS | Performed by: SURGERY

## 2025-04-25 RX ORDER — SUCRALFATE 1 G/1
1 TABLET ORAL 3 TIMES DAILY
Qty: 90 TABLET | Refills: 3 | Status: SHIPPED | OUTPATIENT
Start: 2025-04-25

## 2025-04-25 NOTE — PROGRESS NOTES
[facility] INVASIVE BARIATRIC SURG  [address]  [address]  [place] [id number]  Dept: [phone]    ROBOTIC AND MINIMALLY INVASIVE SURGERY  PROGRESS NOTE INITIAL EVALUATION     Patient: Mariah Dodson        Service Date: 4/25/2025      HPI:     Chief Complaint   Patient presents with    Gastroesophageal Reflux     Possible EGD       History: 69 y.o. female who presents today for GERD and weight gain. She states that her heart burn has become a daily occurrence, following every meal. She rates this as \"moderate\". She is currently taking Tums, Protonix 40 mg daily, and Pepcid 20 mg nightly. Patient also has some difficulty swallowing large pills.     Patient states that she has been gaining weight. Patient states that osteoarthritis of the knee prevents her from exercising. She does participate in yoga 4 days per week and does aquatic exercises weekly. She has been taking Lyrica for back and hip pain. The duration of taking Lyrica corresponds with patient's weight gain.     Patient reports regular bowel movements. She denies nausea, vomiting, fever, and chills.     The patient denies  a history of myocardial infarction, deep vein thrombosis, pulmonary embolism, renal failure, hepatic failure, and stroke. Patient currently takes aspirin 81 mg daily.     Prior Imaging/Testing:  Patient has not had recent EGD.     Patient had a CT of the abdomen and pelvis on 1/29/2022 which showed, \"No acute inflammatory process or bowel obstruction. Appendix not identified. Moderate colonic diverticulosis.\"    Medical History:  Past Medical History:   Diagnosis Date    Abdominal pain     Arthritis     Celiac artery compression syndrome     Depression     Fibromyalgia     GERD (gastroesophageal reflux disease)     Hx of blood clots     PE after back surgery    Hyperlipidemia     Kidney stones      passed spontaneously    Migraines     Sleep apnea     c-pap instructed to

## 2025-04-26 LAB — ZINC SERPL-MCNC: 72.5 UG/DL (ref 60–120)

## 2025-04-27 LAB
RETINYL PALMITATE: 0.09 MG/L (ref 0–0.1)
VITAMIN A LEVEL: 0.56 MG/L (ref 0.3–1.2)
VITAMIN A, INTERP: NORMAL

## 2025-04-28 LAB — VIT B1 PYROPHOSHATE BLD-SCNC: 215 NMOL/L (ref 70–180)

## 2025-05-02 ENCOUNTER — TELEPHONE (OUTPATIENT)
Dept: BARIATRICS/WEIGHT MGMT | Age: 70
End: 2025-05-02

## 2025-05-02 NOTE — TELEPHONE ENCOUNTER
Writer called patient on 5/2/25 to confirm EGD on 5/9/25.    Hello,    You are schedule for an EGD on 5/9/25 at the [facility]. Please arrive at 6:10 am.     [facility] :  [id number] [place] [address], Entrance C  [place] [id number]    Patient Instructions: PLEASE READ!!!  The night before your procedure no food or drink after midnight.  NO alcohol 24 hours prior to procedure   Take your medications with sips of water, except those that need to be taken with food.  If you take aspirin, Plavix, Coumadin (Warfarin) or another blood thinner please ask the prescribing provider if you can stop it 7 days prior to the EGD.  If you are late you may be asked to reschedule.   You MUST bring a support person with you, they need to drive you home.  You will not be able to drive after the procedure.  If you are a female, you may be asked to take a urine pregnancy test.       Any questions , concerns or need to cancel, please call [phone].    Thank you,    [facility].

## 2025-05-08 ENCOUNTER — ANESTHESIA EVENT (OUTPATIENT)
Dept: OPERATING ROOM | Age: 70
End: 2025-05-08
Payer: MEDICARE

## 2025-05-09 ENCOUNTER — HOSPITAL ENCOUNTER (OUTPATIENT)
Age: 70
Setting detail: OUTPATIENT SURGERY
Discharge: HOME | End: 2025-05-09
Attending: SURGERY | Admitting: SURGERY
Payer: MEDICARE

## 2025-05-09 ENCOUNTER — ANESTHESIA (OUTPATIENT)
Dept: OPERATING ROOM | Age: 70
End: 2025-05-09
Payer: MEDICARE

## 2025-05-09 VITALS
TEMPERATURE: 98.1 F | DIASTOLIC BLOOD PRESSURE: 69 MMHG | WEIGHT: 174 LBS | BODY MASS INDEX: 28.99 KG/M2 | HEIGHT: 65 IN | SYSTOLIC BLOOD PRESSURE: 115 MMHG | OXYGEN SATURATION: 97 % | RESPIRATION RATE: 14 BRPM | HEART RATE: 55 BPM

## 2025-05-09 DIAGNOSIS — Z90.3 S/P GASTRIC SLEEVE PROCEDURE: ICD-10-CM

## 2025-05-09 DIAGNOSIS — R10.9 ABDOMINAL PAIN: ICD-10-CM

## 2025-05-09 PROCEDURE — 88342 IMHCHEM/IMCYTCHM 1ST ANTB: CPT

## 2025-05-09 PROCEDURE — 7100000011 HC PHASE II RECOVERY - ADDTL 15 MIN: Performed by: SURGERY

## 2025-05-09 PROCEDURE — 6360000002 HC RX W HCPCS: Performed by: ANESTHESIOLOGY

## 2025-05-09 PROCEDURE — 7100000010 HC PHASE II RECOVERY - FIRST 15 MIN: Performed by: SURGERY

## 2025-05-09 PROCEDURE — 3609012400 HC EGD TRANSORAL BIOPSY SINGLE/MULTIPLE: Performed by: SURGERY

## 2025-05-09 PROCEDURE — 88305 TISSUE EXAM BY PATHOLOGIST: CPT

## 2025-05-09 PROCEDURE — 2580000003 HC RX 258: Performed by: ANESTHESIOLOGY

## 2025-05-09 PROCEDURE — 6360000002 HC RX W HCPCS: Performed by: NURSE ANESTHETIST, CERTIFIED REGISTERED

## 2025-05-09 PROCEDURE — 2709999900 HC NON-CHARGEABLE SUPPLY: Performed by: SURGERY

## 2025-05-09 PROCEDURE — 3700000000 HC ANESTHESIA ATTENDED CARE: Performed by: SURGERY

## 2025-05-09 RX ORDER — SODIUM CHLORIDE 0.9 % (FLUSH) 0.9 %
5-40 SYRINGE (ML) INJECTION EVERY 12 HOURS SCHEDULED
Status: DISCONTINUED | OUTPATIENT
Start: 2025-05-09 | End: 2025-05-09 | Stop reason: HOSPADM

## 2025-05-09 RX ORDER — HYDRALAZINE HYDROCHLORIDE 20 MG/ML
10 INJECTION INTRAMUSCULAR; INTRAVENOUS
Status: DISCONTINUED | OUTPATIENT
Start: 2025-05-09 | End: 2025-05-09 | Stop reason: HOSPADM

## 2025-05-09 RX ORDER — SODIUM CHLORIDE, SODIUM LACTATE, POTASSIUM CHLORIDE, CALCIUM CHLORIDE 600; 310; 30; 20 MG/100ML; MG/100ML; MG/100ML; MG/100ML
INJECTION, SOLUTION INTRAVENOUS CONTINUOUS
Status: DISCONTINUED | OUTPATIENT
Start: 2025-05-09 | End: 2025-05-09 | Stop reason: HOSPADM

## 2025-05-09 RX ORDER — SODIUM CHLORIDE 9 MG/ML
INJECTION, SOLUTION INTRAVENOUS PRN
Status: DISCONTINUED | OUTPATIENT
Start: 2025-05-09 | End: 2025-05-09 | Stop reason: HOSPADM

## 2025-05-09 RX ORDER — NALOXONE HYDROCHLORIDE 0.4 MG/ML
INJECTION, SOLUTION INTRAMUSCULAR; INTRAVENOUS; SUBCUTANEOUS PRN
Status: DISCONTINUED | OUTPATIENT
Start: 2025-05-09 | End: 2025-05-09 | Stop reason: HOSPADM

## 2025-05-09 RX ORDER — PROCHLORPERAZINE EDISYLATE 5 MG/ML
5 INJECTION INTRAMUSCULAR; INTRAVENOUS
Status: DISCONTINUED | OUTPATIENT
Start: 2025-05-09 | End: 2025-05-09 | Stop reason: HOSPADM

## 2025-05-09 RX ORDER — SODIUM CHLORIDE 0.9 % (FLUSH) 0.9 %
5-40 SYRINGE (ML) INJECTION PRN
Status: DISCONTINUED | OUTPATIENT
Start: 2025-05-09 | End: 2025-05-09 | Stop reason: HOSPADM

## 2025-05-09 RX ORDER — LABETALOL HYDROCHLORIDE 5 MG/ML
10 INJECTION, SOLUTION INTRAVENOUS
Status: DISCONTINUED | OUTPATIENT
Start: 2025-05-09 | End: 2025-05-09 | Stop reason: HOSPADM

## 2025-05-09 RX ORDER — FENTANYL CITRATE 50 UG/ML
INJECTION, SOLUTION INTRAMUSCULAR; INTRAVENOUS
Status: DISCONTINUED | OUTPATIENT
Start: 2025-05-09 | End: 2025-05-09 | Stop reason: SDUPTHER

## 2025-05-09 RX ORDER — LIDOCAINE HYDROCHLORIDE 10 MG/ML
1 INJECTION, SOLUTION EPIDURAL; INFILTRATION; INTRACAUDAL; PERINEURAL
Status: COMPLETED | OUTPATIENT
Start: 2025-05-09 | End: 2025-05-09

## 2025-05-09 RX ORDER — PROPOFOL 10 MG/ML
INJECTION, EMULSION INTRAVENOUS
Status: DISCONTINUED | OUTPATIENT
Start: 2025-05-09 | End: 2025-05-09 | Stop reason: SDUPTHER

## 2025-05-09 RX ADMIN — SODIUM CHLORIDE, SODIUM LACTATE, POTASSIUM CHLORIDE, AND CALCIUM CHLORIDE: .6; .31; .03; .02 INJECTION, SOLUTION INTRAVENOUS at 06:59

## 2025-05-09 RX ADMIN — PROPOFOL 40 MG: 10 INJECTION, EMULSION INTRAVENOUS at 08:11

## 2025-05-09 RX ADMIN — LIDOCAINE HYDROCHLORIDE 5 ML: 10 INJECTION, SOLUTION EPIDURAL; INFILTRATION; INTRACAUDAL; PERINEURAL at 08:07

## 2025-05-09 RX ADMIN — PROPOFOL 40 MG: 10 INJECTION, EMULSION INTRAVENOUS at 08:10

## 2025-05-09 RX ADMIN — FENTANYL CITRATE 50 MCG: 50 INJECTION, SOLUTION INTRAMUSCULAR; INTRAVENOUS at 08:06

## 2025-05-09 ASSESSMENT — PAIN - FUNCTIONAL ASSESSMENT
PAIN_FUNCTIONAL_ASSESSMENT: 0-10
PAIN_FUNCTIONAL_ASSESSMENT: 0-10

## 2025-05-09 ASSESSMENT — LIFESTYLE VARIABLES: SMOKING_STATUS: 1

## 2025-05-09 NOTE — OP NOTE
[facility] OR  [address]  [facility] [address]  Dept: [phone]  Loc: [phone]    Preoperative Diagnosis:  GERD    Postoperative Diagnosis:   GERD  Mild antral gastritis  Normal sleeve anatomy    Procedure: Esophagogastroduodenoscopy with Biopsy    Surgeon: Benjamin Mariano DO    Assistant:    Anesthesia: MAC, see anesthesia records    Specimen:    1) Antrum for H. Pylori    Findings:  As above, normal esophagus and duodenum    EBL: NONE    Operative Narrative:   The risks and benefits were explained in detail to the patient who agreed and consented to the procedure.  The patient was taken to the endoscopic suite and placed in a lateral position.  Oxygen was administered via nasal cannula and a mouth guard was placed.  MAC was administered via the anesthetic team.      The endoscope was then advanced into the oropharynx and down into the esophagus under direct visualization. The scope was further advanced through the esophagus, GE junction and stomach to the pylorus under visualization.  The scope was passed through the pylorus and duodenal sweep performed, advancing and visualizing to the second portion of the duodenum.  The scope was then withdrawn to the antrum and cold forceps were used to take biopsies of the antrum for H. Pylori.  Appropriate hemostasis was noted.  The scope was then retroflexed to visualize the GE junction.  Evidence of a hiatal hernia was not noted.  The scope was then slowly withdrawn through the GE junction.  The Z line was noted. The stomach was then decompressed.  The scope was withdrawn from the esophagus and no further lesions noted.      The scope was withdrawn.  The patient tolerated the procedure well.      PPI.    She will follow up with the Bariatric Clinic for further assessment.

## 2025-05-09 NOTE — ANESTHESIA POSTPROCEDURE EVALUATION
Department of Anesthesiology  Postprocedure Note    Patient: Mariah Dodson  MRN: [id number]  YOB: 1955  Date of evaluation: 5/9/2025    Procedure Summary       Date: 05/09/25 Room / Location: [facility] PROCEDURE ROOM / [facility]    Anesthesia Start: 0806 Anesthesia Stop: 0821    Procedure: ESOPHAGOGASTRODUODENOSCOPY BIOPSY Diagnosis:       Abdominal pain      S/P gastric sleeve procedure      (Abdominal pain [R10.9])      (S/P gastric sleeve procedure [Z90.3])    Surgeons: Benjamin Mariano DO Responsible Provider: Melony Zamudio MD    Anesthesia Type: MAC ASA Status: 3            Anesthesia Type: MAC    Connie Phase I: Connie Score: 10    Connie Phase II: Connie Score: 9    Anesthesia Post Evaluation    Patient location during evaluation: PACU  Patient participation: complete - patient participated  Level of consciousness: awake and awake and alert  Pain score: 0  Nausea & Vomiting: no nausea and no vomiting  Cardiovascular status: hemodynamically stable and blood pressure returned to baseline  Respiratory status: acceptable  Hydration status: euvolemic  Multimodal analgesia pain management approach  Pain management: adequate    No notable events documented.

## 2025-05-09 NOTE — ANESTHESIA PRE PROCEDURE
Department of Anesthesiology  Preprocedure Note       Name:  Mariah Dodson   Age:  69 y.o.  :  1955                                          MRN:  [id number]         Date:  2025      Surgeon: Surgeon(s):  Benjamin Mariano DO    Procedure: Procedure(s):  ESOPHAGOGASTRODUODENOSCOPY BIOPSY    Medications prior to admission:   Prior to Admission medications    Medication Sig Start Date End Date Taking? Authorizing Provider   sucralfate (CARAFATE) 1 GM tablet Take 1 tablet by mouth 3 times daily 25  Yes Benjaimn Mariano DO   Collagen-Vitamin C-Biotin (COLLAGEN) 500-50-0.8 MG CAPS Take 4 tablets by mouth daily   Yes Provider, MD [place]   mirtazapine (REMERON) 7.5 MG tablet Take 1 tablet by mouth nightly 2/10/25  Yes Provider, MD [place]   pilocarpine (SALAGEN) 5 MG tablet Take 1 tablet by mouth 3 times daily 24  Yes ProviderVirginia MD   pregabalin (LYRICA) 50 MG capsule Take 1 capsule by mouth every morning. 3/22/25  Yes Provider, MD [place]   pregabalin (LYRICA) 75 MG capsule Take 1 capsule by mouth nightly. 3/22/25  Yes Provider, MD [place]   rimegepant sulfate 75 MG TBDP Take by mouth as needed (migraine) 10/3/24  Yes Provider, MD [place]   busPIRone (BUSPAR) 10 MG tablet Take 2 tablets by mouth in the morning and at bedtime 3/26/25  Yes Provider, MD [place]   thiamine 250 MG tablet Take 400 mg by mouth daily   Yes Provider, MD [place]   famotidine (PEPCID) 20 MG tablet Take 1 tablet by mouth nightly 25  Yes Rhiannon Curtis, APRN - CNP   Probiotic Product (PROBIOTIC PO) Take by mouth   Yes ProviderVirginia MD   busPIRone HCl (BUSPAR PO) Take 20 mg by mouth in the morning and 20 mg in the evening.   Yes Provider, MD [place]   latanoprost (XALATAN) 0.005 % ophthalmic solution INSTILL 1 DROP INTO EACH EYE AT BEDTIME 3/25/21  Yes ProviderVirginia MD   brimonidine (ALPHAGAN) 0.2 % ophthalmic solution brimonidine 0.2 % eye drops 20  Yes

## 2025-05-09 NOTE — H&P
Subjective     The patient is a 69 y.o. female who is here to undergo EGD for GERD.    Body mass index is 28.96 kg/m².       Past Medical History:   Diagnosis Date    Abdominal pain     Arthritis     Celiac artery compression syndrome     Depression     Fibromyalgia     GERD (gastroesophageal reflux disease)     Hx of blood clots     PE after back surgery    Hyperlipidemia     Kidney stones      passed spontaneously    Migraines     Sleep apnea     c-pap instructed to bring Dr. Newton-herveedicralph    Wellness examination     GRANT Zabala DO to see on 20 for clearance   .    Review of Systems - A complete 14 point review of systems was performed.  All was negative unless otherwise documented in HPI.    Allergies:  Allergies   Allergen Reactions    Percocet [Oxycodone-Acetaminophen] Anaphylaxis     Can't breathe    Sulfa Antibiotics Anaphylaxis     Can't breathe    Acetaminophen     Linzess [Linaclotide] Other (See Comments)     seizure    Oxycodone     Tramadol Other (See Comments)     seizure       Past Surgical History:  Past Surgical History:   Procedure Laterality Date    BLADDER SUSPENSION       SECTION      COLONOSCOPY      CYSTOSCOPY Right 5/12/15    with laser litho and rt. stent insertion    JOINT REPLACEMENT      LASIK Bilateral     LUMBAR FUSION      OTHER SURGICAL HISTORY      surgery for celiac artery compression syndrome    SHOULDER ARTHROPLASTY Right     SLEEVE GASTRECTOMY  2020    laparoscopic, robotic, hiatal hernia repair, EGD    SLEEVE GASTRECTOMY N/A 2020    XI LAPAROSCOPIC ROBOTIC GASTRECTOMY SLEEVE,  hiatal hernia repair, EGD performed by Benjamin Mariano DO at [facility] OR    TONSILLECTOMY      TUBAL LIGATION      UPPER GASTROINTESTINAL ENDOSCOPY      UPPER GASTROINTESTINAL ENDOSCOPY N/A 2019    EGD BIOPSY performed by Jono Lutz MD at [facility] Endoscopy    WRIST ARTHROPLASTY Left        Family History:  Family History   Problem Relation Age of Onset    Hypertension

## 2025-05-09 NOTE — DISCHARGE INSTRUCTIONS
POST-ENDOSCOPY INSTRUCTIONS    1. ACTIVITY   No driving, operating machinery, or making important decisions for 24 hours.    Resume normal activity after 24 hours.  You may return to work after 24 hours.    2. DIET    EGD: Resume your usual diet unless specified below.                Diet Modification: Regular    3. MEDICATIONS  (Do not consume alcohol, tranquilizers, or sleeping medications for 24 hours unless advised by your physician)                 Resume your usual medications    4. PHYSICIAN FOLLOW-UP                 Please continue with your previously scheduled appointments                 See your primary care physician as planned.      6. NORMAL CHANGES YOU MAY EXPERIENCE AFTER ENDOSCOPY:      EGD:  Sore throat, some abdominal pain and cramping.            7. CALL YOUR PHYSICIAN IF YOU EXPERIENCE ANY OF THE FOLLOWING      A.  Passing blood rectally or vomiting blood (color may be red or black)      B.  Severe abdominal pain or tenderness (that is not relieved by passing air)      C.  Fever, chills, or excessive sweating      D.  Persistent nausea or vomiting      E.  Redness or swelling at the IV site    If you have additional questions, PLEASE call your doctor or the [facility] center at [phone]

## 2025-05-12 LAB — SURGICAL PATHOLOGY REPORT: NORMAL

## 2025-05-28 PROBLEM — K21.9 GERD WITHOUT ESOPHAGITIS: Status: ACTIVE | Noted: 2025-05-28

## 2025-06-17 ENCOUNTER — OFFICE VISIT (OUTPATIENT)
Age: 70
End: 2025-06-17
Payer: MEDICARE

## 2025-06-17 VITALS
DIASTOLIC BLOOD PRESSURE: 76 MMHG | SYSTOLIC BLOOD PRESSURE: 118 MMHG | WEIGHT: 170 LBS | BODY MASS INDEX: 28.32 KG/M2 | HEART RATE: 69 BPM | HEIGHT: 65 IN

## 2025-06-17 DIAGNOSIS — M43.16 SPONDYLOLISTHESIS OF LUMBAR REGION: ICD-10-CM

## 2025-06-17 DIAGNOSIS — M51.369 DEGENERATION OF INTERVERTEBRAL DISC OF LUMBAR REGION, UNSPECIFIED WHETHER PAIN PRESENT: Primary | ICD-10-CM

## 2025-06-17 DIAGNOSIS — R93.7 ABNORMAL MRI, LUMBAR SPINE: ICD-10-CM

## 2025-06-17 DIAGNOSIS — R15.9 INCONTINENCE OF FECES, UNSPECIFIED FECAL INCONTINENCE TYPE: ICD-10-CM

## 2025-06-17 PROCEDURE — G8400 PT W/DXA NO RESULTS DOC: HCPCS | Performed by: NEUROLOGICAL SURGERY

## 2025-06-17 PROCEDURE — 3017F COLORECTAL CA SCREEN DOC REV: CPT | Performed by: NEUROLOGICAL SURGERY

## 2025-06-17 PROCEDURE — 1159F MED LIST DOCD IN RCRD: CPT | Performed by: NEUROLOGICAL SURGERY

## 2025-06-17 PROCEDURE — 99204 OFFICE O/P NEW MOD 45 MIN: CPT | Performed by: NEUROLOGICAL SURGERY

## 2025-06-17 PROCEDURE — G8419 CALC BMI OUT NRM PARAM NOF/U: HCPCS | Performed by: NEUROLOGICAL SURGERY

## 2025-06-17 PROCEDURE — G8427 DOCREV CUR MEDS BY ELIG CLIN: HCPCS | Performed by: NEUROLOGICAL SURGERY

## 2025-06-17 PROCEDURE — 1123F ACP DISCUSS/DSCN MKR DOCD: CPT | Performed by: NEUROLOGICAL SURGERY

## 2025-06-17 PROCEDURE — 1090F PRES/ABSN URINE INCON ASSESS: CPT | Performed by: NEUROLOGICAL SURGERY

## 2025-06-17 PROCEDURE — 1036F TOBACCO NON-USER: CPT | Performed by: NEUROLOGICAL SURGERY

## 2025-06-17 NOTE — PROGRESS NOTES
Review of Systems   Constitutional:  Positive for appetite change.   Eyes:  Positive for visual disturbance.   Musculoskeletal:  Positive for back pain, gait problem and joint swelling.   Neurological:  Positive for dizziness and weakness.   All other systems reviewed and are negative.

## 2025-06-30 ENCOUNTER — HOSPITAL ENCOUNTER (OUTPATIENT)
Dept: MRI IMAGING | Age: 70
Discharge: HOME | End: 2025-07-02
Attending: NEUROLOGICAL SURGERY
Payer: MEDICARE

## 2025-06-30 DIAGNOSIS — R15.9 INCONTINENCE OF FECES, UNSPECIFIED FECAL INCONTINENCE TYPE: ICD-10-CM

## 2025-06-30 DIAGNOSIS — M51.369 DEGENERATION OF INTERVERTEBRAL DISC OF LUMBAR REGION, UNSPECIFIED WHETHER PAIN PRESENT: ICD-10-CM

## 2025-06-30 DIAGNOSIS — R93.7 ABNORMAL MRI, LUMBAR SPINE: ICD-10-CM

## 2025-06-30 DIAGNOSIS — M43.16 SPONDYLOLISTHESIS OF LUMBAR REGION: ICD-10-CM

## 2025-06-30 LAB — CREAT BLD-MCNC: 0.9 MG/DL (ref 0.6–1.4)

## 2025-06-30 PROCEDURE — A9579 GAD-BASE MR CONTRAST NOS,1ML: HCPCS | Performed by: NEUROLOGICAL SURGERY

## 2025-06-30 PROCEDURE — 72158 MRI LUMBAR SPINE W/O & W/DYE: CPT

## 2025-06-30 PROCEDURE — 72157 MRI CHEST SPINE W/O & W/DYE: CPT

## 2025-06-30 PROCEDURE — 82565 ASSAY OF CREATININE: CPT

## 2025-06-30 PROCEDURE — 6360000004 HC RX CONTRAST MEDICATION: Performed by: NEUROLOGICAL SURGERY

## 2025-06-30 RX ORDER — SODIUM CHLORIDE 0.9 % (FLUSH) 0.9 %
10 SYRINGE (ML) INJECTION PRN
Status: DISCONTINUED | OUTPATIENT
Start: 2025-06-30 | End: 2025-07-03 | Stop reason: HOSPADM

## 2025-06-30 RX ORDER — GADOTERIDOL 279.3 MG/ML
16 INJECTION INTRAVENOUS
Status: COMPLETED | OUTPATIENT
Start: 2025-06-30 | End: 2025-06-30

## 2025-06-30 RX ADMIN — GADOTERIDOL 16 ML: 279.3 INJECTION, SOLUTION INTRAVENOUS at 09:50

## 2025-07-10 NOTE — PROGRESS NOTES
[facility], [facility], [facility]  [address], [address]  [facility] [address]  Dept: [phone]  Dept Fax: [phone]     Patient:  Mariah Dodson  YOB: 1955  Date: 6/17/25      Chief Complaint   Patient presents with    New Patient     Lumbar radiculopathy - hx of lumbar laminectomy in 1999. patient unsure of physican  Xray Lumbar 1/24/25 @ [facility], MRI lumber 2/25/25 @ [facility]              HPI:     History of Present Illness  The patient presents as a new patient for back and right leg pain.    She has been experiencing back and right leg pain for approximately 9 months, with no recollection of any specific injury that could have triggered the onset. The pain extends from her waist down the entire right leg, accompanied by numbness in the left big toe and significant pain in the right groin area. She also reports numbness throughout her right leg, both anteriorly and posteriorly, and severe knee issues that radiate into her foot, primarily along the inside and up through her toes. The pain has progressively worsened over time, exacerbated by excessive movement and somewhat alleviated by rest. Her gait is unsteady, resembling that of an intoxicated individual, due to a lack of balance. She experiences weakness and numbness in her leg, which she distinguishes from her previous sciatica pain.     She also reports bilateral buttock pain that radiates into her mid-back, described as a burning sensation that intensifies with activity and slightly subsides with rest. She has been on Lyrica for about 2 months, which she takes with Tylenol, providing some relief. She underwent physical therapy in February and March 2025, and has tried chiropractic treatment, acupuncture, and pain management, including an injection, none of which have been effective. She was practicing yoga three times a week until about a week

## 2025-08-07 ENCOUNTER — HOSPITAL ENCOUNTER (OUTPATIENT)
Age: 70
Discharge: HOME | End: 2025-08-09
Payer: MEDICARE

## 2025-08-07 ENCOUNTER — OFFICE VISIT (OUTPATIENT)
Age: 70
End: 2025-08-07
Payer: MEDICARE

## 2025-08-07 VITALS
SYSTOLIC BLOOD PRESSURE: 127 MMHG | DIASTOLIC BLOOD PRESSURE: 83 MMHG | WEIGHT: 170 LBS | HEART RATE: 76 BPM | BODY MASS INDEX: 27.32 KG/M2 | HEIGHT: 66 IN

## 2025-08-07 DIAGNOSIS — M43.16 SPONDYLOLISTHESIS OF LUMBAR REGION: Primary | ICD-10-CM

## 2025-08-07 DIAGNOSIS — M43.16 SPONDYLOLISTHESIS OF LUMBAR REGION: ICD-10-CM

## 2025-08-07 DIAGNOSIS — M48.062 LUMBAR STENOSIS WITH NEUROGENIC CLAUDICATION: ICD-10-CM

## 2025-08-07 PROCEDURE — G8419 CALC BMI OUT NRM PARAM NOF/U: HCPCS | Performed by: NEUROLOGICAL SURGERY

## 2025-08-07 PROCEDURE — G8427 DOCREV CUR MEDS BY ELIG CLIN: HCPCS | Performed by: NEUROLOGICAL SURGERY

## 2025-08-07 PROCEDURE — 1036F TOBACCO NON-USER: CPT | Performed by: NEUROLOGICAL SURGERY

## 2025-08-07 PROCEDURE — 1159F MED LIST DOCD IN RCRD: CPT | Performed by: NEUROLOGICAL SURGERY

## 2025-08-07 PROCEDURE — 3017F COLORECTAL CA SCREEN DOC REV: CPT | Performed by: NEUROLOGICAL SURGERY

## 2025-08-07 PROCEDURE — G8400 PT W/DXA NO RESULTS DOC: HCPCS | Performed by: NEUROLOGICAL SURGERY

## 2025-08-07 PROCEDURE — 1090F PRES/ABSN URINE INCON ASSESS: CPT | Performed by: NEUROLOGICAL SURGERY

## 2025-08-07 PROCEDURE — 1123F ACP DISCUSS/DSCN MKR DOCD: CPT | Performed by: NEUROLOGICAL SURGERY

## 2025-08-07 PROCEDURE — 99214 OFFICE O/P EST MOD 30 MIN: CPT | Performed by: NEUROLOGICAL SURGERY

## 2025-08-07 PROCEDURE — 72120 X-RAY BEND ONLY L-S SPINE: CPT

## 2025-08-08 PROBLEM — M48.062 LUMBAR STENOSIS WITH NEUROGENIC CLAUDICATION: Status: ACTIVE | Noted: 2025-08-08

## 2025-08-08 PROBLEM — M43.16 SPONDYLOLISTHESIS OF LUMBAR REGION: Status: ACTIVE | Noted: 2025-08-08

## 2025-08-08 RX ORDER — VITAMIN B COMPLEX
CAPSULE ORAL
COMMUNITY

## 2025-08-08 RX ORDER — KETOCONAZOLE 20 MG/G
1 CREAM TOPICAL DAILY
COMMUNITY
Start: 2025-05-29

## 2025-08-12 ENCOUNTER — HOSPITAL ENCOUNTER (OUTPATIENT)
Dept: PREADMISSION TESTING | Age: 70
Discharge: HOME | End: 2025-08-16
Attending: NEUROLOGICAL SURGERY | Admitting: NEUROLOGICAL SURGERY
Payer: MEDICARE

## 2025-08-12 ENCOUNTER — ANESTHESIA EVENT (OUTPATIENT)
Dept: OPERATING ROOM | Age: 70
End: 2025-08-12
Payer: MEDICARE

## 2025-08-12 VITALS
OXYGEN SATURATION: 95 % | RESPIRATION RATE: 16 BRPM | DIASTOLIC BLOOD PRESSURE: 55 MMHG | TEMPERATURE: 97.5 F | HEIGHT: 65 IN | WEIGHT: 170 LBS | HEART RATE: 70 BPM | SYSTOLIC BLOOD PRESSURE: 89 MMHG | BODY MASS INDEX: 28.32 KG/M2

## 2025-08-12 DIAGNOSIS — Z01.818 PREOP EXAMINATION: Primary | ICD-10-CM

## 2025-08-12 LAB
ANION GAP SERPL CALCULATED.3IONS-SCNC: 11 MMOL/L (ref 9–16)
BASOPHILS # BLD: 0.06 K/UL (ref 0–0.2)
BASOPHILS NFR BLD: 2 % (ref 0–2)
BUN SERPL-MCNC: 17 MG/DL (ref 8–23)
CALCIUM SERPL-MCNC: 9.5 MG/DL (ref 8.6–10.4)
CHLORIDE SERPL-SCNC: 108 MMOL/L (ref 98–107)
CO2 SERPL-SCNC: 27 MMOL/L (ref 20–31)
CREAT SERPL-MCNC: 0.8 MG/DL (ref 0.7–1.2)
EOSINOPHIL # BLD: 0.22 K/UL (ref 0–0.44)
EOSINOPHILS RELATIVE PERCENT: 6 % (ref 0–4)
ERYTHROCYTE [DISTWIDTH] IN BLOOD BY AUTOMATED COUNT: 12.7 % (ref 11.5–14.9)
GFR, ESTIMATED: 79 ML/MIN/1.73M2
GLUCOSE SERPL-MCNC: 83 MG/DL (ref 74–99)
HCT VFR BLD AUTO: 38.7 % (ref 36–46)
HGB BLD-MCNC: 12.8 G/DL (ref 12–16)
IMM GRANULOCYTES # BLD AUTO: <0.03 K/UL (ref 0–0.3)
IMM GRANULOCYTES NFR BLD: 0 %
LYMPHOCYTES NFR BLD: 1.53 K/UL (ref 1.1–3.7)
LYMPHOCYTES RELATIVE PERCENT: 41 % (ref 24–44)
MCH RBC QN AUTO: 32 PG (ref 26–34)
MCHC RBC AUTO-ENTMCNC: 33.1 G/DL (ref 31–37)
MCV RBC AUTO: 96.8 FL (ref 80–100)
MONOCYTES NFR BLD: 0.35 K/UL (ref 0.1–1.2)
MONOCYTES NFR BLD: 9 % (ref 3–12)
NEUTROPHILS NFR BLD: 42 % (ref 36–66)
NEUTS SEG NFR BLD: 1.61 K/UL (ref 1.5–8.1)
NRBC BLD-RTO: 0 PER 100 WBC
PLATELET # BLD AUTO: 177 K/UL (ref 150–450)
PMV BLD AUTO: 9.9 FL (ref 8–13.5)
POTASSIUM SERPL-SCNC: 4 MMOL/L (ref 3.7–5.3)
RBC # BLD AUTO: 4 M/UL (ref 3.95–5.11)
SODIUM SERPL-SCNC: 146 MMOL/L (ref 136–145)
WBC OTHER # BLD: 3.8 K/UL (ref 3.5–11)

## 2025-08-12 PROCEDURE — 80048 BASIC METABOLIC PNL TOTAL CA: CPT

## 2025-08-12 PROCEDURE — 93005 ELECTROCARDIOGRAM TRACING: CPT | Performed by: ANESTHESIOLOGY

## 2025-08-12 PROCEDURE — APPSS45 APP SPLIT SHARED TIME 31-45 MINUTES: Performed by: NURSE PRACTITIONER

## 2025-08-12 PROCEDURE — 85025 COMPLETE CBC W/AUTO DIFF WBC: CPT

## 2025-08-12 PROCEDURE — 36415 COLL VENOUS BLD VENIPUNCTURE: CPT

## 2025-08-12 ASSESSMENT — ENCOUNTER SYMPTOMS
APNEA: 1
TROUBLE SWALLOWING: 1
GASTROINTESTINAL NEGATIVE: 1
BACK PAIN: 1
VOICE CHANGE: 1

## 2025-08-13 LAB
EKG ATRIAL RATE: 57 BPM
EKG P AXIS: 78 DEGREES
EKG P-R INTERVAL: 274 MS
EKG Q-T INTERVAL: 424 MS
EKG QRS DURATION: 76 MS
EKG QTC CALCULATION (BAZETT): 412 MS
EKG R AXIS: 80 DEGREES
EKG T AXIS: 82 DEGREES
EKG VENTRICULAR RATE: 57 BPM

## 2025-08-13 PROCEDURE — 93010 ELECTROCARDIOGRAM REPORT: CPT | Performed by: INTERNAL MEDICINE

## 2025-08-18 ENCOUNTER — OFFICE VISIT (OUTPATIENT)
Dept: BARIATRICS/WEIGHT MGMT | Age: 70
End: 2025-08-18
Payer: MEDICARE

## 2025-08-18 VITALS
OXYGEN SATURATION: 98 % | DIASTOLIC BLOOD PRESSURE: 82 MMHG | WEIGHT: 179 LBS | SYSTOLIC BLOOD PRESSURE: 110 MMHG | HEART RATE: 53 BPM | BODY MASS INDEX: 29.79 KG/M2

## 2025-08-18 DIAGNOSIS — Z98.84 STATUS POST LAPAROSCOPIC SLEEVE GASTRECTOMY: ICD-10-CM

## 2025-08-18 DIAGNOSIS — K21.9 GASTROESOPHAGEAL REFLUX DISEASE WITHOUT ESOPHAGITIS: Primary | ICD-10-CM

## 2025-08-18 PROCEDURE — 99214 OFFICE O/P EST MOD 30 MIN: CPT | Performed by: NURSE PRACTITIONER

## 2025-08-18 PROCEDURE — G8427 DOCREV CUR MEDS BY ELIG CLIN: HCPCS | Performed by: NURSE PRACTITIONER

## 2025-08-18 PROCEDURE — G8400 PT W/DXA NO RESULTS DOC: HCPCS | Performed by: NURSE PRACTITIONER

## 2025-08-18 PROCEDURE — 1090F PRES/ABSN URINE INCON ASSESS: CPT | Performed by: NURSE PRACTITIONER

## 2025-08-18 PROCEDURE — 3017F COLORECTAL CA SCREEN DOC REV: CPT | Performed by: NURSE PRACTITIONER

## 2025-08-18 PROCEDURE — 1123F ACP DISCUSS/DSCN MKR DOCD: CPT | Performed by: NURSE PRACTITIONER

## 2025-08-18 PROCEDURE — 1036F TOBACCO NON-USER: CPT | Performed by: NURSE PRACTITIONER

## 2025-08-18 PROCEDURE — 1159F MED LIST DOCD IN RCRD: CPT | Performed by: NURSE PRACTITIONER

## 2025-08-18 PROCEDURE — G8419 CALC BMI OUT NRM PARAM NOF/U: HCPCS | Performed by: NURSE PRACTITIONER

## 2025-08-18 RX ORDER — PANTOPRAZOLE SODIUM 40 MG/1
40 TABLET, DELAYED RELEASE ORAL
Qty: 60 TABLET | Refills: 5 | Status: SHIPPED | OUTPATIENT
Start: 2025-08-18

## 2025-08-20 ENCOUNTER — HOSPITAL ENCOUNTER (OUTPATIENT)
Age: 70
Setting detail: OUTPATIENT SURGERY
Discharge: HOME | End: 2025-08-20
Attending: NEUROLOGICAL SURGERY | Admitting: NEUROLOGICAL SURGERY
Payer: MEDICARE

## 2025-08-20 ENCOUNTER — APPOINTMENT (OUTPATIENT)
Dept: GENERAL RADIOLOGY | Age: 70
End: 2025-08-20
Attending: NEUROLOGICAL SURGERY
Payer: MEDICARE

## 2025-08-20 ENCOUNTER — ANESTHESIA (OUTPATIENT)
Dept: OPERATING ROOM | Age: 70
End: 2025-08-20
Payer: MEDICARE

## 2025-08-20 VITALS
OXYGEN SATURATION: 100 % | TEMPERATURE: 97.2 F | DIASTOLIC BLOOD PRESSURE: 75 MMHG | RESPIRATION RATE: 14 BRPM | BODY MASS INDEX: 29.99 KG/M2 | SYSTOLIC BLOOD PRESSURE: 114 MMHG | HEIGHT: 65 IN | WEIGHT: 180 LBS | HEART RATE: 52 BPM

## 2025-08-20 DIAGNOSIS — G89.18 ACUTE POSTOPERATIVE PAIN: Primary | ICD-10-CM

## 2025-08-20 PROCEDURE — 3600000003 HC SURGERY LEVEL 3 BASE: Performed by: NEUROLOGICAL SURGERY

## 2025-08-20 PROCEDURE — 3600000013 HC SURGERY LEVEL 3 ADDTL 15MIN: Performed by: NEUROLOGICAL SURGERY

## 2025-08-20 PROCEDURE — 2720000010 HC SURG SUPPLY STERILE: Performed by: NEUROLOGICAL SURGERY

## 2025-08-20 PROCEDURE — 3700000001 HC ADD 15 MINUTES (ANESTHESIA): Performed by: NEUROLOGICAL SURGERY

## 2025-08-20 PROCEDURE — 6360000002 HC RX W HCPCS: Performed by: NEUROLOGICAL SURGERY

## 2025-08-20 PROCEDURE — 7100000010 HC PHASE II RECOVERY - FIRST 15 MIN: Performed by: NEUROLOGICAL SURGERY

## 2025-08-20 PROCEDURE — 7100000030 HC ASPR PHASE II RECOVERY - FIRST 15 MIN: Performed by: NEUROLOGICAL SURGERY

## 2025-08-20 PROCEDURE — 3700000000 HC ANESTHESIA ATTENDED CARE: Performed by: NEUROLOGICAL SURGERY

## 2025-08-20 PROCEDURE — 6360000002 HC RX W HCPCS

## 2025-08-20 PROCEDURE — 2500000003 HC RX 250 WO HCPCS

## 2025-08-20 PROCEDURE — 7100000031 HC ASPR PHASE II RECOVERY - ADDTL 15 MIN: Performed by: NEUROLOGICAL SURGERY

## 2025-08-20 PROCEDURE — 2500000003 HC RX 250 WO HCPCS: Performed by: NEUROLOGICAL SURGERY

## 2025-08-20 PROCEDURE — 6370000000 HC RX 637 (ALT 250 FOR IP): Performed by: ANESTHESIOLOGY

## 2025-08-20 PROCEDURE — 7100000001 HC PACU RECOVERY - ADDTL 15 MIN: Performed by: NEUROLOGICAL SURGERY

## 2025-08-20 PROCEDURE — 7100000011 HC PHASE II RECOVERY - ADDTL 15 MIN: Performed by: NEUROLOGICAL SURGERY

## 2025-08-20 PROCEDURE — 2709999900 HC NON-CHARGEABLE SUPPLY: Performed by: NEUROLOGICAL SURGERY

## 2025-08-20 PROCEDURE — 2580000003 HC RX 258: Performed by: ANESTHESIOLOGY

## 2025-08-20 PROCEDURE — 7100000000 HC PACU RECOVERY - FIRST 15 MIN: Performed by: NEUROLOGICAL SURGERY

## 2025-08-20 RX ORDER — SODIUM CHLORIDE 0.9 % (FLUSH) 0.9 %
5-40 SYRINGE (ML) INJECTION EVERY 12 HOURS SCHEDULED
Status: DISCONTINUED | OUTPATIENT
Start: 2025-08-20 | End: 2025-08-20 | Stop reason: HOSPADM

## 2025-08-20 RX ORDER — SODIUM CHLORIDE 0.9 % (FLUSH) 0.9 %
5-40 SYRINGE (ML) INJECTION PRN
Status: DISCONTINUED | OUTPATIENT
Start: 2025-08-20 | End: 2025-08-20 | Stop reason: HOSPADM

## 2025-08-20 RX ORDER — MIDAZOLAM HYDROCHLORIDE 2 MG/2ML
INJECTION, SOLUTION INTRAMUSCULAR; INTRAVENOUS
Status: DISCONTINUED | OUTPATIENT
Start: 2025-08-20 | End: 2025-08-20 | Stop reason: SDUPTHER

## 2025-08-20 RX ORDER — DEXAMETHASONE SODIUM PHOSPHATE 4 MG/ML
INJECTION, SOLUTION INTRA-ARTICULAR; INTRALESIONAL; INTRAMUSCULAR; INTRAVENOUS; SOFT TISSUE
Status: DISCONTINUED | OUTPATIENT
Start: 2025-08-20 | End: 2025-08-20 | Stop reason: SDUPTHER

## 2025-08-20 RX ORDER — HYDRALAZINE HYDROCHLORIDE 20 MG/ML
10 INJECTION INTRAMUSCULAR; INTRAVENOUS
Status: DISCONTINUED | OUTPATIENT
Start: 2025-08-20 | End: 2025-08-20 | Stop reason: HOSPADM

## 2025-08-20 RX ORDER — METOCLOPRAMIDE HYDROCHLORIDE 5 MG/ML
10 INJECTION INTRAMUSCULAR; INTRAVENOUS
Status: DISCONTINUED | OUTPATIENT
Start: 2025-08-20 | End: 2025-08-20 | Stop reason: HOSPADM

## 2025-08-20 RX ORDER — PROPOFOL 10 MG/ML
INJECTION, EMULSION INTRAVENOUS
Status: DISCONTINUED | OUTPATIENT
Start: 2025-08-20 | End: 2025-08-20 | Stop reason: SDUPTHER

## 2025-08-20 RX ORDER — SODIUM CHLORIDE 9 MG/ML
INJECTION, SOLUTION INTRAVENOUS PRN
Status: DISCONTINUED | OUTPATIENT
Start: 2025-08-20 | End: 2025-08-20 | Stop reason: HOSPADM

## 2025-08-20 RX ORDER — FENTANYL CITRATE 0.05 MG/ML
25 INJECTION, SOLUTION INTRAMUSCULAR; INTRAVENOUS EVERY 5 MIN PRN
Status: DISCONTINUED | OUTPATIENT
Start: 2025-08-20 | End: 2025-08-20 | Stop reason: HOSPADM

## 2025-08-20 RX ORDER — ACETAMINOPHEN 325 MG/1
650 TABLET ORAL
Status: DISCONTINUED | OUTPATIENT
Start: 2025-08-20 | End: 2025-08-20 | Stop reason: HOSPADM

## 2025-08-20 RX ORDER — LIDOCAINE HYDROCHLORIDE 10 MG/ML
1 INJECTION, SOLUTION EPIDURAL; INFILTRATION; INTRACAUDAL; PERINEURAL
Status: DISCONTINUED | OUTPATIENT
Start: 2025-08-20 | End: 2025-08-20 | Stop reason: HOSPADM

## 2025-08-20 RX ORDER — SODIUM CHLORIDE, SODIUM LACTATE, POTASSIUM CHLORIDE, CALCIUM CHLORIDE 600; 310; 30; 20 MG/100ML; MG/100ML; MG/100ML; MG/100ML
INJECTION, SOLUTION INTRAVENOUS CONTINUOUS
Status: DISCONTINUED | OUTPATIENT
Start: 2025-08-20 | End: 2025-08-20 | Stop reason: HOSPADM

## 2025-08-20 RX ORDER — ONDANSETRON 2 MG/ML
INJECTION INTRAMUSCULAR; INTRAVENOUS
Status: DISCONTINUED | OUTPATIENT
Start: 2025-08-20 | End: 2025-08-20 | Stop reason: SDUPTHER

## 2025-08-20 RX ORDER — PREGABALIN 75 MG/1
75 CAPSULE ORAL ONCE
Status: COMPLETED | OUTPATIENT
Start: 2025-08-20 | End: 2025-08-20

## 2025-08-20 RX ORDER — FENTANYL CITRATE 50 UG/ML
INJECTION, SOLUTION INTRAMUSCULAR; INTRAVENOUS
Status: DISCONTINUED | OUTPATIENT
Start: 2025-08-20 | End: 2025-08-20 | Stop reason: SDUPTHER

## 2025-08-20 RX ORDER — HYDROCODONE BITARTRATE AND ACETAMINOPHEN 5; 325 MG/1; MG/1
1 TABLET ORAL
Refills: 0 | Status: COMPLETED | OUTPATIENT
Start: 2025-08-20 | End: 2025-08-20

## 2025-08-20 RX ORDER — ONDANSETRON 2 MG/ML
4 INJECTION INTRAMUSCULAR; INTRAVENOUS
Status: DISCONTINUED | OUTPATIENT
Start: 2025-08-20 | End: 2025-08-20 | Stop reason: HOSPADM

## 2025-08-20 RX ORDER — LABETALOL HYDROCHLORIDE 5 MG/ML
10 INJECTION, SOLUTION INTRAVENOUS
Status: DISCONTINUED | OUTPATIENT
Start: 2025-08-20 | End: 2025-08-20 | Stop reason: HOSPADM

## 2025-08-20 RX ORDER — LIDOCAINE HYDROCHLORIDE 20 MG/ML
INJECTION, SOLUTION EPIDURAL; INFILTRATION; INTRACAUDAL; PERINEURAL
Status: DISCONTINUED | OUTPATIENT
Start: 2025-08-20 | End: 2025-08-20 | Stop reason: SDUPTHER

## 2025-08-20 RX ORDER — DIPHENHYDRAMINE HYDROCHLORIDE 50 MG/ML
12.5 INJECTION, SOLUTION INTRAMUSCULAR; INTRAVENOUS
Status: DISCONTINUED | OUTPATIENT
Start: 2025-08-20 | End: 2025-08-20 | Stop reason: HOSPADM

## 2025-08-20 RX ORDER — BUPIVACAINE HYDROCHLORIDE AND EPINEPHRINE 5; 5 MG/ML; UG/ML
INJECTION, SOLUTION EPIDURAL; INTRACAUDAL; PERINEURAL PRN
Status: DISCONTINUED | OUTPATIENT
Start: 2025-08-20 | End: 2025-08-20 | Stop reason: ALTCHOICE

## 2025-08-20 RX ORDER — HYDROCODONE BITARTRATE AND ACETAMINOPHEN 5; 325 MG/1; MG/1
1 TABLET ORAL EVERY 6 HOURS PRN
Qty: 28 TABLET | Refills: 0 | Status: SHIPPED | OUTPATIENT
Start: 2025-08-20 | End: 2025-08-27

## 2025-08-20 RX ORDER — SENNOSIDES 8.6 MG/1
1 TABLET ORAL 2 TIMES DAILY
Qty: 30 TABLET | Refills: 1 | Status: SHIPPED | OUTPATIENT
Start: 2025-08-20

## 2025-08-20 RX ORDER — TIZANIDINE 2 MG/1
2 TABLET ORAL EVERY 8 HOURS PRN
Qty: 30 TABLET | Refills: 2 | Status: SHIPPED | OUTPATIENT
Start: 2025-08-20

## 2025-08-20 RX ORDER — MEPERIDINE HYDROCHLORIDE 25 MG/ML
12.5 INJECTION INTRAMUSCULAR; INTRAVENOUS; SUBCUTANEOUS EVERY 5 MIN PRN
Status: DISCONTINUED | OUTPATIENT
Start: 2025-08-20 | End: 2025-08-20 | Stop reason: HOSPADM

## 2025-08-20 RX ORDER — ROCURONIUM BROMIDE 10 MG/ML
INJECTION, SOLUTION INTRAVENOUS
Status: DISCONTINUED | OUTPATIENT
Start: 2025-08-20 | End: 2025-08-20 | Stop reason: SDUPTHER

## 2025-08-20 RX ADMIN — SUGAMMADEX 200 MG: 100 INJECTION, SOLUTION INTRAVENOUS at 13:46

## 2025-08-20 RX ADMIN — Medication 2000 MG: at 12:06

## 2025-08-20 RX ADMIN — ROCURONIUM BROMIDE 50 MG: 10 INJECTION, SOLUTION INTRAVENOUS at 11:58

## 2025-08-20 RX ADMIN — SODIUM CHLORIDE, POTASSIUM CHLORIDE, SODIUM LACTATE AND CALCIUM CHLORIDE: 600; 310; 30; 20 INJECTION, SOLUTION INTRAVENOUS at 13:36

## 2025-08-20 RX ADMIN — ONDANSETRON 4 MG: 2 INJECTION, SOLUTION INTRAMUSCULAR; INTRAVENOUS at 13:45

## 2025-08-20 RX ADMIN — FENTANYL CITRATE 50 MCG: 50 INJECTION INTRAMUSCULAR; INTRAVENOUS at 14:09

## 2025-08-20 RX ADMIN — HYDROCODONE BITARTRATE AND ACETAMINOPHEN 1 TABLET: 5; 325 TABLET ORAL at 15:27

## 2025-08-20 RX ADMIN — FENTANYL CITRATE 50 MCG: 50 INJECTION INTRAMUSCULAR; INTRAVENOUS at 11:58

## 2025-08-20 RX ADMIN — LIDOCAINE HYDROCHLORIDE 80 MG: 20 INJECTION, SOLUTION EPIDURAL; INFILTRATION; INTRACAUDAL; PERINEURAL at 11:58

## 2025-08-20 RX ADMIN — MIDAZOLAM HYDROCHLORIDE 2 MG: 1 INJECTION, SOLUTION INTRAMUSCULAR; INTRAVENOUS at 11:58

## 2025-08-20 RX ADMIN — SODIUM CHLORIDE, POTASSIUM CHLORIDE, SODIUM LACTATE AND CALCIUM CHLORIDE: 600; 310; 30; 20 INJECTION, SOLUTION INTRAVENOUS at 10:11

## 2025-08-20 RX ADMIN — DEXAMETHASONE SODIUM PHOSPHATE 8 MG: 4 INJECTION INTRA-ARTICULAR; INTRALESIONAL; INTRAMUSCULAR; INTRAVENOUS; SOFT TISSUE at 11:58

## 2025-08-20 RX ADMIN — PREGABALIN 75 MG: 75 CAPSULE ORAL at 10:11

## 2025-08-20 RX ADMIN — PROPOFOL 200 MG: 10 INJECTION, EMULSION INTRAVENOUS at 11:58

## 2025-08-20 ASSESSMENT — PAIN DESCRIPTION - ONSET
ONSET: AWAKENED FROM SLEEP

## 2025-08-20 ASSESSMENT — PAIN - FUNCTIONAL ASSESSMENT
PAIN_FUNCTIONAL_ASSESSMENT: 0-10
PAIN_FUNCTIONAL_ASSESSMENT: PREVENTS OR INTERFERES SOME ACTIVE ACTIVITIES AND ADLS

## 2025-08-20 ASSESSMENT — PAIN DESCRIPTION - DESCRIPTORS
DESCRIPTORS: SORE
DESCRIPTORS: SORE
DESCRIPTORS: ACHING;DISCOMFORT

## 2025-08-20 ASSESSMENT — PAIN SCALES - GENERAL
PAINLEVEL_OUTOF10: 10

## 2025-09-02 ENCOUNTER — OFFICE VISIT (OUTPATIENT)
Age: 70
End: 2025-09-02

## 2025-09-02 VITALS
SYSTOLIC BLOOD PRESSURE: 126 MMHG | DIASTOLIC BLOOD PRESSURE: 70 MMHG | WEIGHT: 180 LBS | HEIGHT: 65 IN | BODY MASS INDEX: 29.99 KG/M2 | HEART RATE: 61 BPM

## 2025-09-02 DIAGNOSIS — Z98.890 HISTORY OF LUMBAR SURGERY: ICD-10-CM

## 2025-09-02 DIAGNOSIS — M48.062 LUMBAR STENOSIS WITH NEUROGENIC CLAUDICATION: Primary | ICD-10-CM

## 2025-09-02 PROCEDURE — 99024 POSTOP FOLLOW-UP VISIT: CPT | Performed by: NEUROLOGICAL SURGERY

## (undated) DEVICE — STAPLER 60 RELOAD GREEN: Brand: SUREFORM

## (undated) DEVICE — DRESSING TRNSPAR W5XL4.5IN FLM SHT SEMIPERMEABLE WIND

## (undated) DEVICE — SEAL

## (undated) DEVICE — SUTURE MONOCRYL + SZ 4-0 L27IN ABSRB UD L19MM PS-2 3/8 CIR MCP426H

## (undated) DEVICE — DRAPE SURG L W23XL17IN CLR POLYETH TRNSPAR TWL STERI-DRP

## (undated) DEVICE — BINDER ABD 3XL H9XL71 82IN E UNISX 3 PNL

## (undated) DEVICE — SUTURE VICRYL + SZ 3-0 L18IN ABSRB UD SH 1/2 CIR TAPERCUT NDL VCP864D

## (undated) DEVICE — GOWN SURG XL L47IN BLU NONREINFORCED HK AND LOOP AAMI LEV 3

## (undated) DEVICE — DRESSING BORDERED ADH GZ UNIV GEN USE 8INX4IN AND 6INX2IN

## (undated) DEVICE — CANNULA SEAL

## (undated) DEVICE — VESSEL SEALER EXTEND: Brand: ENDOWRIST

## (undated) DEVICE — MANIFOLD SUCT 4 PRT 2 CANSTR FLTR DISP NEPTUNE 2

## (undated) DEVICE — FORCEPS BX 240CM 2.4MM L NDL RAD JAW 4 M00513334

## (undated) DEVICE — TIP COVER ACCESSORY

## (undated) DEVICE — COUNTER NDL 40 COUNT FOAM BLK SGL MAG

## (undated) DEVICE — BLANKET WRM W40.2XL55.9IN IORT LO BODY + MISTRAL AIR

## (undated) DEVICE — TROCAR: Brand: KII FIOS FIRST ENTRY

## (undated) DEVICE — BUR SURG DIA3MM PRECIS NEURO 5 STP NOTCH EXPOSE MRK ELITE

## (undated) DEVICE — Device

## (undated) DEVICE — APPLICATOR MEDICATED 26 CC SOLUTION HI LT ORNG CHLORAPREP

## (undated) DEVICE — SOLUTION ANTIFOG VIS SYS CLEARIFY LAPSCP

## (undated) DEVICE — SOLUTION IRRIG 1000ML 09% SOD CHL USP PIC PLAS CONTAINER

## (undated) DEVICE — SUTURE ABSRB BRAID COAT VLT SH 3-0 27IN VCRL J311H

## (undated) DEVICE — GLOVE SURG SZ 8 L12IN FNGR THK79MIL GRN LTX FREE

## (undated) DEVICE — RESERVOIR,SUCTION,100CC,SILICONE: Brand: MEDLINE

## (undated) DEVICE — NEEDLE HYPO 25GA L1.5IN BLU POLYPR HUB S STL REG BVL STR

## (undated) DEVICE — INSUFFLATION TUBING SET WITH FILTER, FUNNEL CONNECTOR AND LUER LOCK: Brand: JOSNOE MEDICAL INC

## (undated) DEVICE — TOTAL TRAY, 16FR 10ML SIL FOLEY, URN: Brand: MEDLINE

## (undated) DEVICE — COVER LT HNDL BLU PLAS

## (undated) DEVICE — SUTURE VICRYL + SZ 2-0 L18IN ABSRB UD CT1 L36MM 1/2 CIR VCP839D

## (undated) DEVICE — SUTURE SZ 0 27IN 5/8 CIR UR-6  TAPER PT VIOLET ABSRB VICRYL J603H

## (undated) DEVICE — GAUZE,SPONGE,FLUFF,6"X6.75",STRL,5/TRAY: Brand: MEDLINE

## (undated) DEVICE — SPONGE NEURO CTN WHT STRL XRAY DET 0.5X.05IN 10PK

## (undated) DEVICE — STRIP SKIN CLSR W0.5XL4IN WHT SPUNBOUND FBR NYL STERIL HI ADH

## (undated) DEVICE — SYRINGE MED 10ML LUERLOCK TIP W/O SFTY DISP

## (undated) DEVICE — STAPLER 60 RELOAD BLUE: Brand: SUREFORM

## (undated) DEVICE — VISIGI 3D®  CALIBRATION SYSTEM  SIZE 36FR STD W/ BULB: Brand: BOEHRINGER® VISIGI 3D™ SLEEVE GASTRECTOMY CALIBRATION SYSTEM, SIZE 36FR W/BULB

## (undated) DEVICE — SOLUTION IRRIG 1000ML H2O PIC PLAS SHATTERPROOF CONTAINER

## (undated) DEVICE — SUTURE VIC + SH-13-0 27IN  VCP311H

## (undated) DEVICE — SUTURE MCRYL SZ 4-0 L18IN ABSRB UD L16MM PC-3 3/8 CIR PRIM Y845G

## (undated) DEVICE — SUTURE NONABSORBABLE MONOFILAMENT 3-0 PS-1 18 IN BLK ETHILON 1663H

## (undated) DEVICE — BLADE ES ELASTOMERIC COAT INSUL DURABLE BEND UPTO 90DEG

## (undated) DEVICE — STAPLER SKIN L440MM 32MM LNG 12 FIRING B FRM PWR + GRIPPING

## (undated) DEVICE — CATHETER,URETHRAL,REDRUBBER,STRL,16FR: Brand: MEDLINE

## (undated) DEVICE — SUTURE ETHBND EXCEL SZ 0 L30IN NONABSORBABLE GRN L26MM CT-2 X412H

## (undated) DEVICE — BITEBLOCK 54FR W/ DENT RIM BLOX

## (undated) DEVICE — SUTURE VCRL SZ 0 L27IN ABSRB UD L36MM CT-1 1/2 CIR J260H

## (undated) DEVICE — MASTISOL ADHESIVE LIQ 2/3ML

## (undated) DEVICE — DRAIN SURG 19FR 100% SIL RADPQ RND CHN FULL FLUT

## (undated) DEVICE — PERRYSBURG ENDO PACK: Brand: MEDLINE INDUSTRIES, INC.

## (undated) DEVICE — REDUCER: Brand: ENDOWRIST

## (undated) DEVICE — ARM DRAPE

## (undated) DEVICE — FORCEPS BX L240CM WRK CHN 2.8MM STD CAP W/ NDL MIC MESH

## (undated) DEVICE — ADAPTER CLEANING PORPOISE CLEANING

## (undated) DEVICE — TUBING SUCT L10FT L0.1875IN CONN STR UNIV W/ RIB FEM CONN

## (undated) DEVICE — ADHESIVE SKIN CLSR 0.7ML TOP DERMBND ADV

## (undated) DEVICE — GLOVE SURG SZ 65 THK91MIL LTX FREE SYN POLYISOPRENE

## (undated) DEVICE — DRAPE SURG ST 3/4 SHEET W53XL77IN STD POLYPR 3 QTR DISP

## (undated) DEVICE — TOWEL,OR,DSP,ST,NATURAL,DLX,4/PK,20PK/CS: Brand: MEDLINE

## (undated) DEVICE — BLADELESS OBTURATOR: Brand: WECK VISTA

## (undated) DEVICE — AGENT HEMSTAT 1GM PORCINE GEL ABSRB PWD FOR CONT OOZING

## (undated) DEVICE — Device: Brand: DEFENDO VALVE AND CONNECTOR KIT

## (undated) DEVICE — GLOVE ORANGE PI 7 1/2   MSG9075

## (undated) DEVICE — SUTURE VICRYL + SZ 0 L18IN ABSRB UD L36MM CT-1 1/2 CIR VCP840D

## (undated) DEVICE — PLUMEPORT SEO LAPAROSCOPIC SMOKE FILTRATION DEVICE: Brand: PLUMEPORT

## (undated) DEVICE — APPLIER CLP M L L11.4IN DIA10MM ENDOSCP ROT MULT FOR LIG